# Patient Record
Sex: FEMALE | Race: WHITE | NOT HISPANIC OR LATINO | Employment: FULL TIME | URBAN - METROPOLITAN AREA
[De-identification: names, ages, dates, MRNs, and addresses within clinical notes are randomized per-mention and may not be internally consistent; named-entity substitution may affect disease eponyms.]

---

## 2017-04-04 ENCOUNTER — ALLSCRIPTS OFFICE VISIT (OUTPATIENT)
Dept: OTHER | Facility: OTHER | Age: 62
End: 2017-04-04

## 2017-05-22 ENCOUNTER — ALLSCRIPTS OFFICE VISIT (OUTPATIENT)
Dept: OTHER | Facility: OTHER | Age: 62
End: 2017-05-22

## 2017-05-22 DIAGNOSIS — Z00.00 ENCOUNTER FOR GENERAL ADULT MEDICAL EXAMINATION WITHOUT ABNORMAL FINDINGS: ICD-10-CM

## 2017-05-22 DIAGNOSIS — I10 ESSENTIAL (PRIMARY) HYPERTENSION: ICD-10-CM

## 2017-05-22 DIAGNOSIS — H04.129 DRY EYE SYNDROME: ICD-10-CM

## 2017-05-22 DIAGNOSIS — M81.0 AGE-RELATED OSTEOPOROSIS WITHOUT CURRENT PATHOLOGICAL FRACTURE: ICD-10-CM

## 2017-06-28 ENCOUNTER — GENERIC CONVERSION - ENCOUNTER (OUTPATIENT)
Dept: OTHER | Facility: OTHER | Age: 62
End: 2017-06-28

## 2017-07-10 ENCOUNTER — APPOINTMENT (OUTPATIENT)
Dept: LAB | Facility: CLINIC | Age: 62
End: 2017-07-10
Payer: COMMERCIAL

## 2017-07-10 DIAGNOSIS — H04.129 DRY EYE SYNDROME: ICD-10-CM

## 2017-07-10 DIAGNOSIS — Z00.00 ENCOUNTER FOR GENERAL ADULT MEDICAL EXAMINATION WITHOUT ABNORMAL FINDINGS: ICD-10-CM

## 2017-07-10 DIAGNOSIS — I10 ESSENTIAL (PRIMARY) HYPERTENSION: ICD-10-CM

## 2017-07-10 DIAGNOSIS — M81.0 AGE-RELATED OSTEOPOROSIS WITHOUT CURRENT PATHOLOGICAL FRACTURE: ICD-10-CM

## 2017-07-10 LAB
25(OH)D3 SERPL-MCNC: 34.1 NG/ML (ref 30–100)
ALBUMIN SERPL BCP-MCNC: 4.1 G/DL (ref 3.5–5)
ALP SERPL-CCNC: 58 U/L (ref 46–116)
ALT SERPL W P-5'-P-CCNC: 42 U/L (ref 12–78)
ANION GAP SERPL CALCULATED.3IONS-SCNC: 5 MMOL/L (ref 4–13)
AST SERPL W P-5'-P-CCNC: 21 U/L (ref 5–45)
BILIRUB SERPL-MCNC: 0.66 MG/DL (ref 0.2–1)
BUN SERPL-MCNC: 16 MG/DL (ref 5–25)
CALCIUM SERPL-MCNC: 9.7 MG/DL (ref 8.3–10.1)
CHLORIDE SERPL-SCNC: 105 MMOL/L (ref 100–108)
CHOLEST SERPL-MCNC: 216 MG/DL (ref 50–200)
CO2 SERPL-SCNC: 29 MMOL/L (ref 21–32)
CREAT SERPL-MCNC: 0.73 MG/DL (ref 0.6–1.3)
GFR SERPL CREATININE-BSD FRML MDRD: >60 ML/MIN/1.73SQ M
GLUCOSE P FAST SERPL-MCNC: 100 MG/DL (ref 65–99)
HDLC SERPL-MCNC: 46 MG/DL (ref 40–60)
LDLC SERPL CALC-MCNC: 126 MG/DL (ref 0–100)
POTASSIUM SERPL-SCNC: 4.8 MMOL/L (ref 3.5–5.3)
PROT SERPL-MCNC: 7.2 G/DL (ref 6.4–8.2)
SODIUM SERPL-SCNC: 139 MMOL/L (ref 136–145)
TRIGL SERPL-MCNC: 222 MG/DL

## 2017-07-10 PROCEDURE — 36415 COLL VENOUS BLD VENIPUNCTURE: CPT

## 2017-07-10 PROCEDURE — 80053 COMPREHEN METABOLIC PANEL: CPT

## 2017-07-10 PROCEDURE — 86235 NUCLEAR ANTIGEN ANTIBODY: CPT

## 2017-07-10 PROCEDURE — 80061 LIPID PANEL: CPT

## 2017-07-10 PROCEDURE — 82306 VITAMIN D 25 HYDROXY: CPT

## 2017-07-11 ENCOUNTER — GENERIC CONVERSION - ENCOUNTER (OUTPATIENT)
Dept: OTHER | Facility: OTHER | Age: 62
End: 2017-07-11

## 2017-07-11 LAB
ENA SS-A AB SER-ACNC: <0.2 AI (ref 0–0.9)
ENA SS-B AB SER-ACNC: <0.2 AI (ref 0–0.9)

## 2017-11-21 ENCOUNTER — ALLSCRIPTS OFFICE VISIT (OUTPATIENT)
Dept: OTHER | Facility: OTHER | Age: 62
End: 2017-11-21

## 2017-11-22 NOTE — PROGRESS NOTES
Assessment    1  Essential hypertension (401 9) (I10)   2  Osteoporosis (733 00) (M81 0)   3  Essential tremor (333 1) (G25 0)   4  Left thyroid nodule (241 0) (E04 1)    Plan  Colon cancer screening    · COLONOSCOPY; Status:Active - Retrospective By Protocol Authorization; Requestedfor:2017;   Essential hypertension    · Lisinopril 10 MG Oral Tablet   · (1) CBC/PLT/DIFF; Status:Active; Requested [id number];    · (1) COMPREHENSIVE METABOLIC PANEL; Status:Active; Requested [id number];    · (1) LIPID PANEL, FASTING; Status:Active; Requested [id number];    · (1) TSH; Status:Active; Requested EQY:[id number];   Essential hypertension, Essential tremor    · Nadolol 20 MG Oral Tablet; 1/2 TAB AT HS FOR 4 NIGHTS THEN 1 TAB AT HS  Left thyroid nodule    · US THYROID; Status:Hold For - Scheduling; Requested YIN:[id number];   Osteoporosis    · * DXA BONE DENSITY SPINE HIP AND PELVIS; Status:Hold For - Scheduling; Requestedfor:2018; Discussion/Summary  Discussion Summary:   CHANGE LISINOPRIL TO NADOLOL TO HELP WITH TREMOR- IF NO BETTER CALLTHYROID USDEXA SCAN IN -WITH UPDATEIN  IF ALL STABLE  Counseling Documentation With Imm: The patient was counseled regarding diagnostic results,-- instructions for management,-- risk factor reductions,-- prognosis,-- patient and family education,-- impressions,-- risks and benefits of treatment options,-- importance of compliance with treatment  Chief Complaint  Chief Complaint Free Text Note Form: PT HERE FOR 6 MONTH FOLLOW UPSTILL HAS SHAKING IN HER HANDSHX OF FAMILIAL TREMORSIS ON LISINOPRIL AND DOING WELL; HER BP OFF OF MEDS WAS ELEVATED      History of Present Illness  Essential Tremor (Brief): The patient is being seen for worsening symptoms of essential tremor   Symptoms:  tremor, but-- no muscle stiffness,-- no muscle weakness,-- no poor coordination,-- no choking,-- no difficulty speaking,-- no difficulty walking,-- no difficulty rising from a chair-- and-- no difficulty initiating movement  The patient is currently experiencing symptoms  Associated symptoms:  no muscle pain,-- no anxiety,-- no palpitations,-- no sweating,-- no embarrassment-- and-- no depression  No associated symptoms are reported  HPI: PT HERE FOR FOLLOW UPHAS ESTRELLITA GODWINLABS WERE REVIEWED FROM JULY;     Hypertension (Follow-Up): The patient presents for follow-up of essential hypertension-- and-- BP WELL CONTROLLED ON LISINOPRIL- PT HAS TREMOR- WILL CHANGE TO BETA BLOCKER ;  The patient states she has been doing well with her blood pressure control since the last visit  She has no comorbid illnesses  She has no significant interval events  Symptoms: The patient is currently asymptomatic  denies impaired vision,-- denies dyspnea,-- denies chest pain,-- denies intermittent leg claudication-- and-- denies lower extremity edema  Review of Systems  Complete-Female:  Constitutional: as noted in HPI,-- not feeling poorly-- and-- not feeling tired  Eyes: WEARS GLASSES, but-- No complaints of eye pain, no red eyes, no eyesight problems, no discharge, no dry eyes, no itching of eyes,-- no eyesight problems-- and-- no purulent discharge from the eyes  ENT: no complaints of earache, no loss of hearing, no nose bleeds, no nasal discharge, no sore throat, no hoarseness,-- no nosebleeds-- and-- no nasal discharge  Cardiovascular: No complaints of slow heart rate, no fast heart rate, no chest pain, no palpitations, no leg claudication, no lower extremity edema,-- as noted in HPI-- and-- no chest pain  Respiratory: cough, but-- No complaints of shortness of breath, no wheezing, no cough, no SOB on exertion, no orthopnea, no PND,-- no shortness of breath,-- no wheezing-- and-- no shortness of breath during exertion    Gastrointestinal: No complaints of abdominal pain, no constipation, no nausea or vomiting, no diarrhea, no bloody stools,-- no abdominal pain,-- no nausea,-- no constipation-- and-- no diarrhea  Genitourinary: No complaints of dysuria, no incontinence, no pelvic pain, no dysmenorrhea, no vaginal discharge or bleeding,-- no dysuria-- and-- no incontinence  Musculoskeletal: No complaints of arthralgias, no myalgias, no joint swelling or stiffness, no limb pain or swelling,-- no arthralgias-- and-- no myalgias  Integumentary: No complaints of skin rash or lesions, no itching, no skin wounds, no breast pain or lump-- and-- no rashes  Neurological: No complaints of headache, no confusion, no convulsions, no numbness, no dizziness or fainting, no tingling, no limb weakness, no difficulty walking,-- no headache,-- no numbness,-- no confusion-- and-- no dizziness  Psychiatric: Not suicidal, no sleep disturbance, no anxiety or depression, no change in personality, no emotional problems  Endocrine: No complaints of proptosis, no hot flashes, no muscle weakness, no deepening of the voice, no feelings of weakness,-- no proptosis-- and-- no hot flashes  Hematologic/Lymphatic: No complaints of swollen glands, no swollen glands in the neck, does not bleed easily, does not bruise easily,-- no swollen glands,-- no tendency for easy bleeding,-- no tendency for easy bruising-- and-- no swollen glands in the neck  ROS Reviewed:   ROS reviewed  Active Problems  1  Allergic rhinitis (477 9) (J30 9)   2  Cervical adenitis (289 3) (I88 9)   3  Colon cancer screening (V76 51) (Z12 11)   4  Dry eye (375 15) (H04 129)   5  Essential hypertension (401 9) (I10)   6  Denied: History of Mental disorder   7  Osteoporosis (733 00) (M81 0)   8  Sciatica of right side (724 3) (M54 31)   9  Denied: History of Substance abuse   10  Thyromegaly (240 9) (E04 9)    Past Medical History  1  History of lymphadenopathy (V13 89) (M89 049)   2  History of screening mammography (V15 89) (Z92 89)   3  Denied: History of Mental disorder   4   Denied: History of Substance abuse  Active Problems And Past Medical History Reviewed: The active problems and past medical history were reviewed and updated today  Surgical History  1  History of Breast Surgery Reduction Procedure   2  History of Tonsillectomy  Surgical History Reviewed: The surgical history was reviewed and updated today  Family History  Mother    1  Family history of Coronary disease   2  Family history of diabetes mellitus (V18 0) (Z83 3)   3  Family history of hypertension (V17 49) (Z82 49)   4  Family history of osteoporosis (V17 81) (Z82 62)  Father    5  Family history of diabetes mellitus (V18 0) (Z83 3)  Family History Reviewed: The family history was reviewed and updated today  Social History     · Never a smoker   · Never smoked cigarettes (V49 89) (Z78 9)  Social History Reviewed: The social history was reviewed and updated today  The social history was reviewed and is unchanged  Current Meds   1  Calcium 600+D 600-400 MG-UNIT Oral Tablet; Therapy: (Recorded:12Jan2016) to Recorded   2  ChoiceFul Multivitamin CAPS; Therapy: (Recorded:12Jan2016) to Recorded   3  Fish Oil CAPS; Therapy: (Recorded:12Jan2016) to Recorded   4  GNP Lutein TABS; Therapy: (Recorded:12Jan2016) to Recorded   5  Ibandronate Sodium 150 MG Oral Tablet; TAKE 1 TABLET BY MOUTH MONTHLY; Therapy: 48MXP1865 to (Evaluate:14Jan2018)  Requested for: 03DZW2218; Last Rx:19Jan2017 Ordered   6  Lisinopril 10 MG Oral Tablet; TAKE 1 TABLET BY MOUTH EVERY DAY; Therapy: 63ZZH5812 to (Evaluate:22Jan2018)  Requested for: 02Uuz4729; Last Rx:28Mli0889 Ordered   7  Restasis 0 05 % Ophthalmic Emulsion; INSTILL 1 DROP IN BOTH EYES EVERY 12 HOURS DAILY; Therapy: 10GTN9662 to (Evaluate:13Jan2018)  Requested for: 79Flw5745; Last Rx:48Bsh4938 Ordered   8  ValACYclovir HCl - 1 GM Oral Tablet; TAKE 1 TABLET EVERY 12 HOURS DAILY; Therapy: 09THT8223 to (Evaluate:26Jun2016)  Requested for: 89DPE6670; Last Rx:28Jan2016 Ordered   9  Vitamin D3 1000 UNIT Oral Tablet;  Therapy: (Recorded:12Jan2016) to Recorded    Allergies  1  No Known Drug Allergies    Vitals  Vital Signs    Recorded: 21Nov2017 02:02PM   Temperature 98 1 F   Heart Rate 74   Respiration 16   Systolic 570   Diastolic 70   Height 5 ft 7 in   Weight 199 lb    BMI Calculated 31 17   BSA Calculated 2 02       Physical Exam   Constitutional  General appearance: No acute distress, well appearing and well nourished  Eyes  Conjunctiva and lids: No swelling, erythema or discharge  Pupils and irises: Equal, round and reactive to light  Ears, Nose, Mouth, and Throat  External inspection of ears and nose: Normal    Otoscopic examination: Tympanic membranes translucent with normal light reflex  Canals patent without erythema  Nasal mucosa, septum, and turbinates: Normal without edema or erythema  Oropharynx: Normal with no erythema, edema, exudate or lesions  Pulmonary  Respiratory effort: No increased work of breathing or signs of respiratory distress  Auscultation of lungs: Clear to auscultation  Auscultation of the lungs revealed no expiratory wheezing,-- normal expiratory time-- and-- no inspiratory wheezing  no rales or crackles were heard bilaterally  no rhonchi  no friction rub  no wheezing  no diminished breath sounds  no bronchial breath sounds  Cardiovascular  Palpation of heart: Normal PMI, no thrills  Auscultation of heart: Normal rate and rhythm, normal S1 and S2, without murmurs  Abdomen  Abdomen: Non-tender, no masses  Liver and spleen: No hepatomegaly or splenomegaly  Lymphatic  Palpation of lymph nodes in neck: No lymphadenopathy  Musculoskeletal  Gait and station: Normal    Digits and nails: Normal without clubbing or cyanosis  Inspection/palpation of joints, bones, and muscles: Normal    Skin  Skin and subcutaneous tissue: Normal without rashes or lesions  Neurologic SL TREMOR LEFT UPPER EXT - INTENTION  Signatures   Electronically signed by :  Joao Rg DO; Nov 21 2017  2:46PM EST (Author)

## 2018-01-11 NOTE — RESULT NOTES
Verified Results  * DXA BONE DENSITY SPINE HIP AND PELVIS 29Jun2016 08:45AM Conception Andrew Order Number: [id number]     Test Name Result Flag Reference   DXA BONE DENSITY SPINE HIP AND PELVIS (Report)     CENTRAL DXA SCAN     CLINICAL HISTORY:  64year old post-menopausal  female risk factors include family history of osteoporosis  Degenerative arthritis  TECHNIQUE: Bone densitometry was performed using a Hologic Horizon A  bone densitometer  Regions of interest appear properly placed  There are no obvious fractures or other confounding variables which could limit the study  Degenerative changes of the   lumbar spine and hip  This will falsely elevate the bone mineral densities in these regions  COMPARISON: January 22, 2009     RESULTS:    LUMBAR SPINE: L1-L4:   BMD 0 928 gm/cm2   T-score -1 1   Z-score 0 2     LUMBAR SPINE L2 and L4 (average) :         BMD 0 909 gm/sq-cm        T-score is -1 5         Z-score is -0 2          LEFT TOTAL HIP:   BMD 0 839 gm/cm2   T-score -0 8   Z-score 0 0     LEFT FEMORAL NECK:   BMD 0 557 gm/cm2   T-score -2 6   Z-score -1 4           ASSESSMENT:   1  Based on the WHO classification, the T-score of -2 6 in the left hip is consistent with osteoporosis  2  Since the prior study, there has been a decrease in the bone mineral density of the lumbar spine and an increase in the bone mineral density of the left hip  It should be noted however that the examinations were performed on dissimilar DXA units  3  According to the [address] [place] [address], prescription therapy is recommended with a T-score of -2 5 or less in the spine or hip after appropriate evaluation to exclude secondary causes     4  A daily intake of at least 1200 mg Calcium and 800 to 1000 IU of Vitamin D, as well as weight bearing and muscle strengthening exercise, fall prevention and avoidance of tobacco and excessive alcohol intake as basic preventive measures are suggested  5  Repeat DXA scan in 18-24 months as clinically indicated  WHO CLASSIFICATION:   Normal (a T-score of -1 0 or higher)   Low bone mineral density (a T-score of less than -1 0 but higher than -2 5)   Osteoporosis (a T-score of -2 5 or less)   Severe osteoporosis (a T-score of -2 5 or less with a fragility fracture)             Workstation performed: [id number]       Discussion/Summary   DXA scan shows osteoporosis of the hip  We can consider starting medication now(fosomax, boniva) (once a week or once a month) in addition to calcium and vitamin d  Or we can discuss it at your appointment

## 2018-01-11 NOTE — RESULT NOTES
Message   Cholesterol is better but total and TG went up;  please add vitamin D 4000 iu daily  Verified Results  (1) CBC/PLT/DIFF 53Jsk2611 09:30AM Nurys Wharton    Order Number: [id number]     Test Name Result Flag Reference   WBC COUNT 8 21 Thousand/uL  4 31-10 16   RBC COUNT 4 78 Million/uL  3 81-5 12   HEMOGLOBIN 13 9 g/dL  11 5-15 4   HEMATOCRIT 41 5 %  34 8-46  1   MCV 87 fL  82-98   MCH 29 1 pg  26 8-34 3   MCHC 33 5 g/dL  31 4-37 4   RDW 13 1 %  11 6-15 1   MPV 10 0 fL  8 9-12 7   PLATELET COUNT 453 Thousands/uL  149-390   nRBC AUTOMATED 0 /100 WBCs     NEUTROPHILS RELATIVE PERCENT 65 %  43-75   LYMPHOCYTES RELATIVE PERCENT 28 %  14-44   MONOCYTES RELATIVE PERCENT 4 %  4-12   EOSINOPHILS RELATIVE PERCENT 2 %  0-6   BASOPHILS RELATIVE PERCENT 1 %  0-1   NEUTROPHILS ABSOLUTE COUNT 5 27 Thousands/?L  1 85-7 62   LYMPHOCYTES ABSOLUTE COUNT 2 32 Thousands/?L  0 60-4 47   MONOCYTES ABSOLUTE COUNT 0 36 Thousand/?L  0 17-1 22   EOSINOPHILS ABSOLUTE COUNT 0 19 Thousand/?L  0 00-0 61   BASOPHILS ABSOLUTE COUNT 0 04 Thousands/?L  0 00-0 10   - Patient Instructions: This bloodwork is non-fasting  Please drink two glasses of water morning of bloodwork  - Patient Instructions: This bloodwork is non-fasting  Please drink two glasses of water morning of bloodwork  (1) COMPREHENSIVE METABOLIC PANEL 79EAT2350 23:39ZX Nurys Wharton    Order Number: [id number]     Test Name Result Flag Reference   GLUCOSE,RANDM 101 mg/dL     If the patient is fasting, the ADA then defines impaired fasting glucose as > 100 mg/dL and diabetes as > or equal to 123 mg/dL     SODIUM 139 mmol/L  136-145   POTASSIUM 4 5 mmol/L  3 5-5 3   CHLORIDE 104 mmol/L  100-108   CARBON DIOXIDE 28 mmol/L  21-32   ANION GAP (CALC) 7 mmol/L  4-13   BLOOD UREA NITROGEN 13 mg/dL  5-25   CREATININE 0 65 mg/dL  0 60-1 30   Standardized to IDMS reference method   CALCIUM 8 7 mg/dL  8 3-10 1   BILI, TOTAL 0 50 mg/dL  0 20-1 00   ALK PHOSPHATAS 79 U/L     ALT (SGPT) 34 U/L  12-78   AST(SGOT) 16 U/L  5-45   ALBUMIN 3 7 g/dL  3 5-5 0   TOTAL PROTEIN 7 1 g/dL  6 4-8 2   eGFR Non-African American      >60 0 ml/min/1 73sq m   - Patient Instructions: This is a fasting blood test  Water,black tea or black  coffee only after 9:00pm the night before test Drink 2 glasses of water the morning of test - Patient Instructions: This bloodwork is non-fasting  Please drink two glasses of   water morning of bloodwork  National Kidney Disease Education Program recommendations are as follows:  GFR calculation is accurate only with a steady state creatinine  Chronic Kidney disease less than 60 ml/min/1 73 sq  meters  Kidney failure less than 15 ml/min/1 73 sq  meters  (1) LIPID PANEL, FASTING [id number] 09:30AM Maribelfan Downingwin    Order Number: [id number]     Test Name Result Flag Reference   CHOLESTEROL 230 mg/dL H    HDL,DIRECT 50 mg/dL  40-60   Specimen collection should occur prior to Metamizole administration due to the potential for falsely depressed results  LDL CHOLESTEROL CALCULATED 141 mg/dL H 0-100   - Patient Instructions: This is a fasting blood test  Water,black tea or black  coffee only after 9:00pm the night before test   Drink 2 glasses of water the morning of test     - Patient Instructions: This is a fasting blood test  Water,black tea or black  coffee only after 9:00pm the night before test Drink 2 glasses of water the morning of test - Patient Instructions: This bloodwork is non-fasting  Please drink two glasses of   water morning of bloodwork    Triglyceride:         Normal              <150 mg/dl       Borderline High    150-199 mg/dl       High               200-499 mg/dl       Very High          >499 mg/dl  Cholesterol:         Desirable        <200 mg/dl      Borderline High  200-239 mg/dl      High             >239 mg/dl  HDL Cholesterol:        High    >59 mg/dL      Low     <41 mg/dL  LDL CALCULATED:    This screening LDL is a calculated result  It does not have the accuracy of the Direct Measured LDL in the monitoring of patients with hyperlipidemia and/or statin therapy  Direct Measure LDL (WON256) must be ordered separately in these patients  TRIGLYCERIDES 196 mg/dL H <=150   Specimen collection should occur prior to N-Acetylcysteine or Metamizole administration due to the potential for falsely depressed results  (1) TSH [id number] 09:30AM EMcube   TW Order Number: [id number]     Test Name Result Flag Reference   TSH 1 460 uIU/mL  0 358-3 740   - Patient Instructions: This bloodwork is non-fasting  Please drink two glasses of water morning of bloodwork  - Patient Instructions: This is a fasting blood test  Water,black tea or black  coffee only after 9:00pm the night before test Drink 2 glasses of water the morning of test - Patient Instructions: This bloodwork is non-fasting  Please drink two glasses of   water morning of bloodwork  Patients undergoing fluorescein dye angiography may retain small amounts of fluorescein in the body for 48-72 hours post procedure  Samples containing fluorescein can produce falsely depressed TSH values  If the patient had this procedure,a specimen should be resubmitted post fluorescein clearance            The recommended reference ranges for TSH during pregnancy are as follows:  First trimester 0 1 to 2 5 uIU/mL  Second trimester  0 2 to 3 0 uIU/mL  Third trimester 0 3 to 3 0 uIU/m     (1) VITAMIN D 25-HYDROXY 20Dec2016 09:30AM EMcube   TW Order Number: [id number]     Test Name Result Flag Reference   VIT D 25-HYDROX 23 6 ng/mL L 30 0-100 0   This assay is a certified procedure of the CDC Vitamin D Standardization Certification Program (VDSCP)     Deficiency <20ng/ml   Insufficiency 20-30ng/ml   Sufficient  ng/ml     *Patients undergoing fluorescein dye angiography may retain small amounts of fluorescein in the body for 48-72 hours post procedure  Samples containing fluorescein can produce falsely elevated Vitamin D values  If the patient had this procedure, a specimen should be resubmitted post fluorescein clearance

## 2018-01-11 NOTE — PROGRESS NOTES
Assessment    1  Essential hypertension (401 9) (I10)   2  Osteoporosis (733 00) (M81 0)   3  Colon cancer screening (V76 51) (Z12 11)   4  Encounter for preventive health examination (V70 0) (Z00 00)   5  Dry eye (375 15) (H04 129)    Plan  Colon cancer screening    · COLONOSCOPY; Status:Active; Requested for:22May2017;   Dry eye    · Restasis 0 05 % Ophthalmic Emulsion; INSTILL 1 DROP IN BOTH EYES EVERY  12 HOURS DAILY   · (1) SJOGRENS ANTIBODIES; Status:Active; Requested for:22May2017; Health Maintenance    · (1) COMPREHENSIVE METABOLIC PANEL; Status:Active; Requested for:22May2017; Health Maintenance, Essential hypertension    · (1) LIPID PANEL, FASTING; Status:Active; Requested for:22May2017; Health Maintenance, Osteoporosis    · (1) VITAMIN D 25-HYDROXY; Status:Active; Requested for:22May2017;   Osteoporosis    · * DXA BONE DENSITY SPINE HIP AND PELVIS; Status:Hold For - Scheduling;  Requested for:22May2017;     Discussion/Summary  health maintenance visit healthy adult female Currently, she eats a healthy diet  cervical cancer screening is current Breast cancer screening: mammogram is current, mammogram has been ordered and mammogram is needed every year  Colorectal cancer screening: colonoscopy has been ordered  Osteoporosis screening: bone mineral density testing is current  Advice and education were given regarding weight loss, calcium supplements and vitamin D supplements  Patient discussion: discussed with the patient  OBTAIN LABS- CHECK SJOGRENS AB  ADD RESTASIS  ADD B6 50 BID AND COCK UP SPLINT LEFT HAND/WRIST AT HS  COLONO DUE  IMM UP TO DATE  MAMMO AND PAP UPT OD ATE;  The patient was counseled regarding diagnostic results, instructions for management, risk factor reductions, prognosis, patient and family education, impressions, risks and benefits of treatment options, importance of compliance with treatment        Chief Complaint  PATIENT HERE FOR ANNUAL EXAM;   SHE HAS NOTICED TREMOR OF LEFT HAND WHEN HOLDING ITEMS;   SHE IS ON BISPHOSPONATE;   SHE GETS DRY EYES       History of Present Illness  HM, Adult Female: The patient is being seen for a health maintenance evaluation  General Health: The patient's health since the last visit is described as good  She has regular dental visits  She denies vision problems  Vision care includes wearing glasses  She denies hearing loss  Immunizations status: up to date  Lifestyle:  She consumes a diverse and healthy diet  She does not have any weight concerns  She exercises regularly  She does not use tobacco  She denies alcohol use  She denies drug use  Screening: cancer screening reviewed and current  metabolic screening reviewed and current  risk screening reviewed and current  Dry Eye: Stephy Ramon presents with complaints of dry eye  Associated symptoms include foreign body sensation, but no eye itching, no eye redness, no eye burning, no eye pain, no contact lens intolerance, no blurry vision, no eye discharge, no photophobia, no blinking, no lid closure problems, no lid lumps, no lid crusting, no dry mouth, no myalgias and no arthralgias  Tremor: Stephy Ramon presents with complaints of tremor  Associated symptoms include no muscle pain, no muscle stiffness, no muscle weakness, no poor coordination, no micrographia, no choking, no difficulty speaking, no difficulty walking, no difficulty rising from a chair, no difficulty starting movement, no anxiety, no palpitations, no sweating, no weight loss, no shortness of breath, no fatigue, no confusion, no forgetfulness, no embarrassment, no depression and no erectile dysfunction  HPI: PT HERE FOR ANNUAL EXAM  SHE HAS DRY EYES   OCC TREMOR LEFT HAND  OCC TEARING RIGHT EYE WITH FB SENSATION       Osteoporosis (Follow-Up): The patient's osteoporosis has been stable since the last visit  Most recent DXA results: T-score <-2 5  Comorbid Illnesses: FEMORAL NECK -2 6   She has no complications  She has no significant interval events  Symptoms: The patient is currently asymptomatic  Hypertension (Follow-Up): The patient presents for follow-up of essential hypertension  The patient states she has been doing well with her blood pressure control since the last visit  She has no comorbid illnesses  She has no significant interval events  Symptoms: The patient is currently asymptomatic  denies impaired vision, denies dyspnea, denies chest pain, denies intermittent leg claudication and denies lower extremity edema  Review of Systems    Constitutional: as noted in HPI, not feeling poorly and not feeling tired  Eyes: No complaints of eye pain, no red eyes, no eyesight problems, no discharge, no dry eyes, no itching of eyes  ENT: no complaints of earache, no loss of hearing, no nose bleeds, no nasal discharge, no sore throat, no hoarseness  Cardiovascular: No complaints of slow heart rate, no fast heart rate, no chest pain, no palpitations, no leg claudication, no lower extremity edema, as noted in HPI and no chest pain  Respiratory: cough, but No complaints of shortness of breath, no wheezing, no cough, no SOB on exertion, no orthopnea, no PND, no shortness of breath, no wheezing and no shortness of breath during exertion  Gastrointestinal: No complaints of abdominal pain, no constipation, no nausea or vomiting, no diarrhea, no bloody stools, no abdominal pain, no nausea, no constipation and no diarrhea  Genitourinary: No complaints of dysuria, no incontinence, no pelvic pain, no dysmenorrhea, no vaginal discharge or bleeding, no dysuria and no incontinence  Musculoskeletal: No complaints of arthralgias, no myalgias, no joint swelling or stiffness, no limb pain or swelling, no arthralgias and no myalgias  Integumentary: No complaints of skin rash or lesions, no itching, no skin wounds, no breast pain or lump and no rashes     Neurological: No complaints of headache, no confusion, no convulsions, no numbness, no dizziness or fainting, no tingling, no limb weakness, no difficulty walking, no headache, no numbness, no confusion and no dizziness  Psychiatric: Not suicidal, no sleep disturbance, no anxiety or depression, no change in personality, no emotional problems  Endocrine: No complaints of proptosis, no hot flashes, no muscle weakness, no deepening of the voice, no feelings of weakness, no proptosis and no hot flashes  Hematologic/Lymphatic: No complaints of swollen glands, no swollen glands in the neck, does not bleed easily, does not bruise easily, no swollen glands, no tendency for easy bleeding, no tendency for easy bruising and no swollen glands in the neck  ROS reviewed  Active Problems    1  Allergic rhinitis (477 9) (J30 9)   2  Cervical adenitis (289 3) (I88 9)   3  Essential hypertension (401 9) (I10)   4  Denied: History of Mental disorder   5  Osteoporosis (733 00) (M81 0)   6  Sciatica of right side (724 3) (M54 31)   7  Denied: History of Substance abuse   8  Thyromegaly (240 9) (E04 9)    Past Medical History    · History of lymphadenopathy (V13 89) (Z87 898)   · Denied: History of Mental disorder   · Denied: History of Substance abuse    Surgical History    · History of Breast Surgery Reduction Procedure   · History of Tonsillectomy    Family History  Mother    · Family history of Coronary disease   · Family history of diabetes mellitus (V18 0) (Z83 3)   · Family history of hypertension (V17 49) (Z82 49)   · Family history of osteoporosis (V17 81) (Z80 61)  Father    · Family history of diabetes mellitus (V18 0) (Z83 3)    Social History    · Never a smoker   · Never smoked cigarettes (V49 89) (Z78 9)    Current Meds   1  Calcium 600+D 600-400 MG-UNIT Oral Tablet; Therapy: (Recorded:12Jan2016) to Recorded   2  ChoiceFul Multivitamin CAPS; Therapy: (Recorded:12Jan2016) to Recorded   3  Fish Oil CAPS;    Therapy: (Recorded:12Jan2016) to Recorded 4  GNP Lutein TABS; Therapy: (Recorded:12Jan2016) to Recorded   5  Ibandronate Sodium 150 MG Oral Tablet; TAKE 1 TABLET BY MOUTH MONTHLY; Therapy: 84XMZ2728 to (Evaluate:14Jan2018)  Requested for: 09SBT7527; Last   Rx:19Jan2017 Ordered   6  Lisinopril 10 MG Oral Tablet; TAKE 1 TABLET BY MOUTH EVERY DAY; Therapy: 12SUV5831 to (Evaluate:20Jun2017)  Requested for: 22Nov2016; Last   Rx:22Nov2016 Ordered   7  ValACYclovir HCl - 1 GM Oral Tablet; TAKE 1 TABLET EVERY 12 HOURS DAILY; Therapy: 85JKH6264 to (Evaluate:26Jun2016)  Requested for: 99AYG4551; Last   Rx:28Jan2016 Ordered   8  Vitamin D3 1000 UNIT Oral Tablet; Therapy: (Recorded:12Jan2016) to Recorded    Allergies    1  No Known Drug Allergies    Vitals   Recorded: 76EJI1312 10:20AM   Temperature 97 4 F   Heart Rate 76   Respiration 16   Systolic 456   Diastolic 72   Height 5 ft 7 in   Weight 198 lb    BMI Calculated 31 01   BSA Calculated 2 01     Physical Exam    Constitutional   General appearance: No acute distress, well appearing and well nourished  Eyes   Conjunctiva and lids: No swelling, erythema or discharge  Pupils and irises: Equal, round, reactive to light  Ears, Nose, Mouth, and Throat   External inspection of ears and nose: Normal     Otoscopic examination: Tympanic membranes translucent with normal light reflex  Canals patent without erythema  Hearing: Normal     Nasal mucosa, septum, and turbinates: Normal without edema or erythema  Lips, teeth, and gums: Normal, good dentition  Oropharynx: Normal with no erythema, edema, exudate or lesions  Inspection of the oropharynx showed fully visible tonsils, uvula and soft palate (Mallampati class 1)  Oral mucosa was moist, but was normal  The palate examination showed no abnormalities  The tongue was normal  The tonsils were normal    Neck   Neck: Supple, symmetric, trachea midline, no masses  Thyroid: Normal, no thyromegaly      Pulmonary   Respiratory effort: No increased work of breathing or signs of respiratory distress  Percussion of chest: Normal     Auscultation of lungs: Clear to auscultation  Cardiovascular   Palpation of heart: Normal PMI, no thrills  Auscultation of heart: Normal rate and rhythm, normal S1 and S2, no murmurs  Examination of extremities for edema and/or varicosities: Normal     Lymphatic   Palpation of lymph nodes in neck: No lymphadenopathy  Musculoskeletal   Gait and station: Normal     Digits and nails: Normal without clubbing or cyanosis  Range of motion: Normal     Stability: Normal   SOME NUMBNESS LEFT 3-4 DIGITS; NORMAL ROM;    Skin   Palpation of skin and subcutaneous tissue: Normal turgor  Neurologic   Cranial nerves: Cranial nerves II-XII intact  Cortical function: Normal mental status  Reflexes: 2+ and symmetric  Sensation: No sensory loss  Coordination: Normal finger to nose and heel to shin  Psychiatric   Judgment and insight: Normal     Orientation to person, place, and time: Normal        Results/Data  PHQ-2 Adult Depression Screening 26OLJ3047 10:25AM User, Ahs     Test Name Result Flag Reference   PHQ-2 Adult Depression Score 0     Over the last two weeks, how often have you been bothered by any of the following problems? Little interest or pleasure in doing things: Not at all - 0  Feeling down, depressed, or hopeless: Not at all - 0   PHQ-2 Adult Depression Screening Negative         Signatures   Electronically signed by :  Joao Rg DO; May 22 2017 11:10AM EST                       (Author)

## 2018-01-13 VITALS
WEIGHT: 195.2 LBS | HEIGHT: 67 IN | HEART RATE: 84 BPM | TEMPERATURE: 98.6 F | DIASTOLIC BLOOD PRESSURE: 90 MMHG | SYSTOLIC BLOOD PRESSURE: 134 MMHG | RESPIRATION RATE: 18 BRPM | BODY MASS INDEX: 30.64 KG/M2

## 2018-01-14 VITALS
RESPIRATION RATE: 16 BRPM | HEART RATE: 74 BPM | DIASTOLIC BLOOD PRESSURE: 70 MMHG | SYSTOLIC BLOOD PRESSURE: 122 MMHG | TEMPERATURE: 98.1 F | WEIGHT: 199 LBS | BODY MASS INDEX: 31.23 KG/M2 | HEIGHT: 67 IN

## 2018-01-14 VITALS
DIASTOLIC BLOOD PRESSURE: 72 MMHG | HEIGHT: 67 IN | WEIGHT: 198 LBS | RESPIRATION RATE: 16 BRPM | TEMPERATURE: 97.4 F | SYSTOLIC BLOOD PRESSURE: 122 MMHG | HEART RATE: 76 BPM | BODY MASS INDEX: 31.08 KG/M2

## 2018-01-15 NOTE — RESULT NOTES
Verified Results  US THYROID 25BOG1138 08:45AM Jen Davis Order Number: [id number]     Test Name Result Flag Reference   US THYROID (Report)     THYROID ULTRASOUND     INDICATION: Fullness on routine physical examination  COMPARISON: None  TECHNIQUE:  Ultrasound of the thyroid was performed with a high frequency linear transducer in transverse and sagittal planes including volumetric imaging sweeps as well as traditional still imaging technique  FINDINGS:   Normal homogeneous smooth echotexture  Right gland: 5 0 x 1 3 x 1 9 cm  No dominant nodules  Left gland: 5 4 x 1 0 x 1 5 cm  Left midpole  0 3 x 0 4 x 0 2 cm  Possibly spongiform  Smooth, well defined margins  No calcifications  Nodule is not taller than it is wide  No priors for comparison  Isthmus: 0 3 cm in AP dimension  No dominant nodules  IMPRESSION:      Tiny 4 mm likely spongiform nodule in the midpole left gland  No dominant nodules meeting criteria for biopsy based on recent published guidelines from [employer] are identified         Workstation performed: [id number]     Signed by:   Ángela Kim MD   6/30/16

## 2018-01-18 NOTE — PROGRESS NOTES
Assessment    1  Essential hypertension (401 9) (I10)    Plan  Herpes simplex infection    · ValACYclovir HCl - 1 GM Oral Tablet; TAKE 1 TABLET EVERY 12 HOURS DAILY    Discussion/Summary  Discussion Summary:   CONTINUE LISINOPRIL 10 MG DAILY  FOLLOW UP IN 4 MONTHS;      REFILL VALTREX FOR ORAL ULCERS ON OCCASION  Counseling Documentation With Imm: The patient was counseled regarding diagnostic results, instructions for management, risk factor reductions, prognosis, patient and family education, impressions, risks and benefits of treatment options, importance of compliance with treatment  Chief Complaint  Chief Complaint Free Text Note Form: PATIENT HERE FOR FOLLOW UP OF BP  SHE FEELS WELL      History of Present Illness  HPI: PATIENT HAS NO SIDE EFFECTS FORM T HE MEDS AND SHE FEELS WELL   Hypertension (Follow-Up): The patient presents for follow-up of essential hypertension  She has no comorbid illnesses  She has no significant interval events  Symptoms: The patient is currently asymptomatic  Review of Systems  Complete-Female:   Constitutional: No fever, no chills, feels well, no tiredness, no recent weight gain or weight loss  Eyes: No complaints of eye pain, no red eyes, no eyesight problems, no discharge, no dry eyes, no itching of eyes, no eye pain and eyes not red  ENT: no complaints of earache, no loss of hearing, no nose bleeds, no nasal discharge, no sore throat, no hoarseness, no hearing loss and no nasal discharge  Cardiovascular: No complaints of slow heart rate, no fast heart rate, no chest pain, no palpitations, no leg claudication, no lower extremity edema, the heart rate was not slow, no chest pain, the heart rate was not fast and no palpitations  Respiratory: No complaints of shortness of breath, no wheezing, no cough, no SOB on exertion, no orthopnea, no PND     Gastrointestinal: No complaints of abdominal pain, no constipation, no nausea or vomiting, no diarrhea, no bloody stools, no abdominal pain, no nausea and no diarrhea  Genitourinary: No complaints of dysuria, no incontinence, no pelvic pain, no dysmenorrhea, no vaginal discharge or bleeding and no dysuria  Musculoskeletal: No complaints of arthralgias, no myalgias, no joint swelling or stiffness, no limb pain or swelling  Integumentary: No complaints of skin rash or lesions, no itching, no skin wounds, no breast pain or lump  Neurological: No complaints of headache, no confusion, no convulsions, no numbness, no dizziness or fainting, no tingling, no limb weakness, no difficulty walking  Psychiatric: Not suicidal, no sleep disturbance, no anxiety or depression, no change in personality, no emotional problems  Endocrine: No complaints of proptosis, no hot flashes, no muscle weakness, no deepening of the voice, no feelings of weakness  Hematologic/Lymphatic: No complaints of swollen glands, no swollen glands in the neck, does not bleed easily, does not bruise easily  ROS Reviewed:   ROS reviewed  Active Problems    1  Allergic rhinitis (477 9) (J30 9)   2  Breast screening (V76 10) (Z12 39)   3  Essential hypertension (401 9) (I10)   4  Herpes simplex infection (054 9) (B00 9)    Past Medical History    1  History of lymphadenopathy (V13 89) (G40 246)    Surgical History    1  History of Breast Surgery Reduction Procedure   2  History of Tonsillectomy    Family History    1  Family history of Coronary disease   2  Family history of diabetes mellitus (V18 0) (Z83 3)   3  Family history of hypertension (V17 49) (Z82 49)    4  Family history of diabetes mellitus (V18 0) (Z83 3)    Social History    · Never a smoker    Current Meds   1  Calcium 600+D 600-400 MG-UNIT Oral Tablet; Therapy: (Recorded:12Jan2016) to Recorded   2  ChoiceFul Multivitamin CAPS; Therapy: (Recorded:12Jan2016) to Recorded   3  Fish Oil CAPS; Therapy: (Recorded:12Jan2016) to Recorded   4  GNP Lutein TABS;    Therapy: (Recorded:12Jan2016) to Recorded   5  Lisinopril 10 MG Oral Tablet; TAKE 1 TABLET BY MOUTH EVERY DAY; Therapy: 50MLH9642 to (Evaluate:12Ysr7323)  Requested for: 12Jan2016; Last Rx:12Jan2016   Ordered   6  Vitamin D3 1000 UNIT Oral Tablet; Therapy: (Recorded:12Jan2016) to Recorded    Allergies    1  No Known Drug Allergies    Vitals  Vital Signs [Data Includes: Current Encounter]    Recorded: 45RIP5112 02:00PM   Temperature 98 3 F   Heart Rate 84   Respiration 19   Systolic 603   Diastolic 76   Height 5 ft 7 in   Weight 196 lb    BMI Calculated 30 7   BSA Calculated 2     Physical Exam    Constitutional   General appearance: No acute distress, well appearing and well nourished  WDWN FEMALE IN NAD  Eyes   Conjunctiva and lids: No swelling, erythema or discharge  Pupils and irises: Equal, round and reactive to light  Ears, Nose, Mouth, and Throat   External inspection of ears and nose: Normal   EOMI PERRLA  Otoscopic examination: Tympanic membranes translucent with normal light reflex  Canals patent without erythema  Nasal mucosa, septum, and turbinates: Normal without edema or erythema  Oropharynx: Normal with no erythema, edema, exudate or lesions  CLEAR  Pulmonary   Respiratory effort: No increased work of breathing or signs of respiratory distress  Auscultation of lungs: Clear to auscultation  CLEAR B/L  Cardiovascular   Palpation of heart: Normal PMI, no thrills  REGULAR NO ECTOPY  Auscultation of heart: Normal rate and rhythm, normal S1 and S2, without murmurs  Examination of extremities for edema and/or varicosities: Normal     Carotid pulses: Normal     Abdomen   Abdomen: Non-tender, no masses  Liver and spleen: No hepatomegaly or splenomegaly  Lymphatic   Palpation of lymph nodes in neck: No lymphadenopathy  Musculoskeletal   Gait and station: Normal     Digits and nails: Normal without clubbing or cyanosis      Inspection/palpation of joints, bones, and muscles: Normal     Skin   Skin and subcutaneous tissue: Normal without rashes or lesions  Neurologic   Cranial nerves: Cranial nerves 2-12 intact  Reflexes: 2+ and symmetric  Sensation: No sensory loss  Psychiatric   Orientation to person, place, and time: Normal     Mood and affect: Normal          Signatures   Electronically signed by :  Flex Roe DO; Jan 28 2016  2:38PM EST                       (Author)

## 2018-01-18 NOTE — RESULT NOTES
Discussion/Summary   Blood sugar is 100 (top normal) and triglycerides are elevated- rest of labs and sjogrens' antibodies are negative  Verified Results  (1) COMPREHENSIVE METABOLIC PANEL 06MGR9802 67:29MR [employer]   TW Order Number: [id number]     Test Name Result Flag Reference   SODIUM 139 mmol/L  136-145   POTASSIUM 4 8 mmol/L  3 5-5 3   CHLORIDE 105 mmol/L  100-108   CARBON DIOXIDE 29 mmol/L  21-32   ANION GAP (CALC) 5 mmol/L  4-13   BLOOD UREA NITROGEN 16 mg/dL  5-25   CREATININE 0 73 mg/dL  0 60-1 30   Standardized to IDMS reference method   CALCIUM 9 7 mg/dL  8 3-10 1   BILI, TOTAL 0 66 mg/dL  0 20-1 00   ALK PHOSPHATAS 58 U/L     ALT (SGPT) 42 U/L  12-78   AST(SGOT) 21 U/L  5-45   ALBUMIN 4 1 g/dL  3 5-5 0   TOTAL PROTEIN 7 2 g/dL  6 4-8 2   eGFR Non-African American      >60 0 ml/min/1 73sq [facility] Disease Education Program recommendations are as follows:  GFR calculation is accurate only with a steady state creatinine  Chronic Kidney disease less than 60 ml/min/1 73 sq  meters  Kidney failure less than 15 ml/min/1 73 sq  meters  GLUCOSE FASTING 100 mg/dL H 65-99     (1) LIPID PANEL, FASTING 76JCK8289 09:16AM [employer]   TW Order Number: [id number]     Test Name Result Flag Reference   CHOLESTEROL 216 mg/dL H    HDL,DIRECT 46 mg/dL  40-60   Specimen collection should occur prior to Metamizole administration due to the potential for falsely depressed results  LDL CHOLESTEROL CALCULATED 126 mg/dL H 0-100   Triglyceride:         Normal              <150 mg/dl       Borderline High    150-199 mg/dl       High               200-499 mg/dl       Very High          >499 mg/dl  Cholesterol:         Desirable        <200 mg/dl      Borderline High  200-239 mg/dl      High             >239 mg/dl  HDL Cholesterol:        High    >59 mg/dL      Low     <41 mg/dL  LDL CALCULATED:    This screening LDL is a calculated result    It does not have the accuracy of the Direct Measured LDL in the monitoring of patients with hyperlipidemia and/or statin therapy  Direct Measure LDL (DBW801) must be ordered separately in these patients  TRIGLYCERIDES 222 mg/dL H <=150   Specimen collection should occur prior to N-Acetylcysteine or Metamizole administration due to the potential for falsely depressed results  (1) VITAMIN D 25-HYDROXY [id number] 09:16AM [employer]    Order Number: [id number]     Test Name Result Flag Reference   VIT D 25-HYDROX 34 1 ng/mL  30 0-100 0   This assay is a certified procedure of the CDC Vitamin D Standardization Certification Program (VDSCP)     Deficiency <20ng/ml   Insufficiency 20-30ng/ml   Sufficient  ng/ml     *Patients undergoing fluorescein dye angiography may retain small amounts of fluorescein in the body for 48-72 hours post procedure  Samples containing fluorescein can produce falsely elevated Vitamin D values  If the patient had this procedure, a specimen should be resubmitted post fluorescein clearance       (1) [facility] ANTIBODIES 72UTU5428 09:16AM [employer]    Order Number: [id number]     Test Name Result Flag Reference   SS-A <0 2 AI  0 0 - 0 9   SS-B <0 2 AI  0 0 - 0 9   Performed at:  [address]  [id number]  : Cecilia Meraz MD, Phone:  [phone]

## 2018-06-02 LAB
ALBUMIN SERPL-MCNC: 4.7 G/DL (ref 3.6–5.1)
ALBUMIN/GLOB SERPL: 2 (CALC) (ref 1–2.5)
ALP SERPL-CCNC: 59 U/L (ref 33–130)
ALT SERPL-CCNC: 24 U/L (ref 6–29)
AST SERPL-CCNC: 19 U/L (ref 10–35)
BASOPHILS # BLD AUTO: 70 CELLS/UL (ref 0–200)
BASOPHILS NFR BLD AUTO: 0.8 %
BILIRUB SERPL-MCNC: 0.7 MG/DL (ref 0.2–1.2)
BUN SERPL-MCNC: 16 MG/DL (ref 7–25)
BUN/CREAT SERPL: NORMAL (CALC) (ref 6–22)
CALCIUM SERPL-MCNC: 9.9 MG/DL (ref 8.6–10.4)
CHLORIDE SERPL-SCNC: 101 MMOL/L (ref 98–110)
CHOLEST SERPL-MCNC: 240 MG/DL
CHOLEST/HDLC SERPL: 5.5 (CALC)
CO2 SERPL-SCNC: 29 MMOL/L (ref 20–31)
CREAT SERPL-MCNC: 0.81 MG/DL (ref 0.5–0.99)
EOSINOPHIL # BLD AUTO: 158 CELLS/UL (ref 15–500)
EOSINOPHIL NFR BLD AUTO: 1.8 %
ERYTHROCYTE [DISTWIDTH] IN BLOOD BY AUTOMATED COUNT: 12.4 % (ref 11–15)
GLOBULIN SER CALC-MCNC: 2.4 G/DL (CALC) (ref 1.9–3.7)
GLUCOSE SERPL-MCNC: 97 MG/DL (ref 65–99)
HCT VFR BLD AUTO: 44.9 % (ref 35–45)
HDLC SERPL-MCNC: 44 MG/DL
HGB BLD-MCNC: 15.4 G/DL (ref 11.7–15.5)
LDLC SERPL CALC-MCNC: 157 MG/DL (CALC)
LYMPHOCYTES # BLD AUTO: 2869 CELLS/UL (ref 850–3900)
LYMPHOCYTES NFR BLD AUTO: 32.6 %
MCH RBC QN AUTO: 30.6 PG (ref 27–33)
MCHC RBC AUTO-ENTMCNC: 34.3 G/DL (ref 32–36)
MCV RBC AUTO: 89.1 FL (ref 80–100)
MONOCYTES # BLD AUTO: 502 CELLS/UL (ref 200–950)
MONOCYTES NFR BLD AUTO: 5.7 %
NEUTROPHILS # BLD AUTO: 5201 CELLS/UL (ref 1500–7800)
NEUTROPHILS NFR BLD AUTO: 59.1 %
NONHDLC SERPL-MCNC: 196 MG/DL (CALC)
PLATELET # BLD AUTO: 339 THOUSAND/UL (ref 140–400)
PMV BLD REES-ECKER: 10 FL (ref 7.5–12.5)
POTASSIUM SERPL-SCNC: 4.6 MMOL/L (ref 3.5–5.3)
PROT SERPL-MCNC: 7.1 G/DL (ref 6.1–8.1)
RBC # BLD AUTO: 5.04 MILLION/UL (ref 3.8–5.1)
SL AMB EGFR AFRICAN AMERICAN: 91 ML/MIN/1.73M2
SL AMB EGFR NON AFRICAN AMERICAN: 79 ML/MIN/1.73M2
SODIUM SERPL-SCNC: 139 MMOL/L (ref 135–146)
TRIGL SERPL-MCNC: 216 MG/DL
TSH SERPL-ACNC: 1.5 MIU/L (ref 0.4–4.5)
WBC # BLD AUTO: 8.8 THOUSAND/UL (ref 3.8–10.8)

## 2018-06-21 DIAGNOSIS — M81.0 AGE-RELATED OSTEOPOROSIS WITHOUT CURRENT PATHOLOGICAL FRACTURE: ICD-10-CM

## 2018-06-21 DIAGNOSIS — E04.1 NONTOXIC SINGLE THYROID NODULE: ICD-10-CM

## 2018-06-21 DIAGNOSIS — I10 ESSENTIAL (PRIMARY) HYPERTENSION: ICD-10-CM

## 2018-06-28 ENCOUNTER — TRANSCRIBE ORDERS (OUTPATIENT)
Dept: LAB | Facility: CLINIC | Age: 63
End: 2018-06-28

## 2018-07-02 ENCOUNTER — HOSPITAL ENCOUNTER (OUTPATIENT)
Dept: RADIOLOGY | Age: 63
Discharge: HOME | End: 2018-07-02
Payer: COMMERCIAL

## 2018-07-02 DIAGNOSIS — M81.0 AGE-RELATED OSTEOPOROSIS WITHOUT CURRENT PATHOLOGICAL FRACTURE: ICD-10-CM

## 2018-07-02 DIAGNOSIS — E04.1 NONTOXIC SINGLE THYROID NODULE: ICD-10-CM

## 2018-07-02 PROCEDURE — 77080 DXA BONE DENSITY AXIAL: CPT

## 2018-07-02 PROCEDURE — 76536 US EXAM OF HEAD AND NECK: CPT

## 2018-07-11 ENCOUNTER — OFFICE VISIT (OUTPATIENT)
Dept: FAMILY MEDICINE CLINIC | Facility: CLINIC | Age: 63
End: 2018-07-11
Payer: COMMERCIAL

## 2018-07-11 VITALS
WEIGHT: 200 LBS | BODY MASS INDEX: 32.14 KG/M2 | HEIGHT: 66 IN | HEART RATE: 80 BPM | TEMPERATURE: 98.4 F | RESPIRATION RATE: 16 BRPM | SYSTOLIC BLOOD PRESSURE: 132 MMHG | DIASTOLIC BLOOD PRESSURE: 76 MMHG

## 2018-07-11 DIAGNOSIS — Z83.1 FAMILY HISTORY OF COLD SORES: ICD-10-CM

## 2018-07-11 DIAGNOSIS — M85.9 LOW BONE DENSITY: ICD-10-CM

## 2018-07-11 DIAGNOSIS — I10 ESSENTIAL HYPERTENSION: ICD-10-CM

## 2018-07-11 DIAGNOSIS — J06.9 VIRAL UPPER RESPIRATORY TRACT INFECTION: ICD-10-CM

## 2018-07-11 DIAGNOSIS — E78.01 FAMILIAL HYPERCHOLESTEROLEMIA: Primary | ICD-10-CM

## 2018-07-11 DIAGNOSIS — H04.129 DRY EYE: ICD-10-CM

## 2018-07-11 PROBLEM — E04.1 LEFT THYROID NODULE: Status: ACTIVE | Noted: 2017-11-21

## 2018-07-11 PROCEDURE — 3078F DIAST BP <80 MM HG: CPT | Performed by: INTERNAL MEDICINE

## 2018-07-11 PROCEDURE — 99214 OFFICE O/P EST MOD 30 MIN: CPT | Performed by: INTERNAL MEDICINE

## 2018-07-11 PROCEDURE — 3075F SYST BP GE 130 - 139MM HG: CPT | Performed by: INTERNAL MEDICINE

## 2018-07-11 PROCEDURE — 3008F BODY MASS INDEX DOCD: CPT | Performed by: INTERNAL MEDICINE

## 2018-07-11 RX ORDER — VALACYCLOVIR HYDROCHLORIDE 1 G/1
1000 TABLET, FILM COATED ORAL 2 TIMES DAILY
Qty: 90 TABLET | Refills: 3 | Status: SHIPPED | OUTPATIENT
Start: 2018-07-11 | End: 2022-07-29

## 2018-07-11 RX ORDER — NADOLOL 20 MG/1
20 TABLET ORAL
Qty: 90 TABLET | Refills: 3 | Status: SHIPPED | OUTPATIENT
Start: 2018-07-11 | End: 2019-01-17 | Stop reason: SDUPTHER

## 2018-07-11 RX ORDER — CYCLOSPORINE 0.5 MG/ML
1 EMULSION OPHTHALMIC EVERY 12 HOURS
Qty: 5.5 ML | Refills: 3 | Status: SHIPPED | OUTPATIENT
Start: 2018-07-11 | End: 2018-11-13 | Stop reason: SDUPTHER

## 2018-07-11 RX ORDER — IBANDRONATE SODIUM 150 MG/1
150 TABLET, FILM COATED ORAL
Refills: 3 | COMMUNITY
Start: 2018-06-02 | End: 2018-11-25 | Stop reason: SDUPTHER

## 2018-07-11 RX ORDER — CYCLOSPORINE 0.5 MG/ML
1 EMULSION OPHTHALMIC EVERY 12 HOURS
COMMUNITY
Start: 2017-05-22 | End: 2018-07-11 | Stop reason: SDUPTHER

## 2018-07-11 RX ORDER — NADOLOL 20 MG/1
1 TABLET ORAL
COMMUNITY
Start: 2018-02-12 | End: 2018-07-11 | Stop reason: SDUPTHER

## 2018-07-11 RX ORDER — VALACYCLOVIR HYDROCHLORIDE 1 G/1
1 TABLET, FILM COATED ORAL EVERY 12 HOURS
COMMUNITY
Start: 2014-07-24 | End: 2018-07-11 | Stop reason: SDUPTHER

## 2018-07-11 RX ORDER — AZITHROMYCIN 250 MG/1
TABLET, FILM COATED ORAL
Qty: 6 TABLET | Refills: 0 | Status: SHIPPED | OUTPATIENT
Start: 2018-07-11 | End: 2018-07-15

## 2018-07-11 NOTE — PROGRESS NOTES
ASSESSMENT and PLAN:  Vandana Dos Santos is a 63 y o  female with:   Problem List Items Addressed This Visit     Essential hypertension    Relevant Medications    nadolol (CORGARD) 20 mg tablet    Low bone density     Pt with hx of osteoproosis  She ahs been on boniva for since 1/2017  She is doing well and her bone density has increased  Check dexa in 2 years  [id number] treatmetn           Familial hypercholesterolemia - Primary    Relevant Orders    Comprehensive metabolic panel    Lipid panel    Family history of cold sores    Relevant Medications    valACYclovir (VALTREX) 1,000 mg tablet    Dry eye    Relevant Medications    cycloSPORINE (RESTASIS) 0 05 % ophthalmic emulsion    Viral upper respiratory tract infection    Relevant Medications    valACYclovir (VALTREX) 1,000 mg tablet    azithromycin (ZITHROMAX) 250 mg tablet        Recent Results (from the past 1008 hour(s))   Lipid panel    Collection Time: 06/01/18 10:08 AM   Result Value Ref Range    Total Cholesterol 240 (H) <200 mg/dL    HDL 44 (L) >50 mg/dL    Triglycerides 216 (H) <150 mg/dL    LDL Direct 157 (H) mg/dL (calc)    Chol HDLC Ratio 5 5 (H) <5 0 (calc)    Non-HDL Cholesterol 196 (H) <130 mg/dL (calc)   Comprehensive metabolic panel    Collection Time: 06/01/18 10:08 AM   Result Value Ref Range    SL AMB GLUCOSE 97 65 - 99 mg/dL    BUN 16 7 - 25 mg/dL    Creatinine, Serum 0 81 0 50 - 0 99 mg/dL    eGFR Non  79 > OR = 60 mL/min/1 73m2    SL AMB EGFR  91 > OR = 60 mL/min/1 73m2    SL AMB BUN/CREATININE RATIO NOT APPLICABLE 6 - 22 (calc)    SL AMB SODIUM 139 135 - 146 mmol/L    SL AMB POTASSIUM 4 6 3 5 - 5 3 mmol/L    SL AMB CHLORIDE 101 98 - 110 mmol/L    SL AMB CARBON DIOXIDE 29 20 - 31 mmol/L    SL AMB CALCIUM 9 9 8 6 - 10 4 mg/dL    SL AMB PROTEIN, TOTAL 7 1 6 1 - 8 1 g/dL    Serum Albumin 4 7 3 6 - 5 1 g/dL    SL AMB GLOBULIN 2 4 1 9 - 3 7 g/dL (calc)    SL AMB ALBUMIN/GLOBULIN RATIO 2 0 1 0 - 2 5 (calc)    SL AMB BILIRUBIN, TOTAL 0 7 0 2 - 1 2 mg/dL    SL AMB ALKALINE PHOSPHATASE 59 33 - 130 U/L    SL AMB AST 19 10 - 35 U/L    SL AMB ALT 24 6 - 29 U/L   CBC and differential    Collection Time: 18 10:08 AM   Result Value Ref Range    SL AMB LAB WHITE BLOOD CELL COUNT 8 8 3 8 - 10 8 Thousand/uL    SL AMB LAB RED BLOOD CELLS 5 04 3 80 - 5 10 Million/uL    Hemoglobin 15 4 11 7 - 15 5 g/dL    Hematocrit 44 9 35 0 - 45 0 %    MCV 89 1 80 0 - 100 0 fL    MCH 30 6 27 0 - 33 0 pg    MCHC 34 3 32 0 - 36 0 g/dL    RDW 12 4 11 0 - 15 0 %    Platelet Count 030 526 - 400 Thousand/uL    SL AMB MPV 10 0 7 5 - 12 5 fL    Neutrophils (Absolute) 5,201 1,500 - 7,800 cells/uL    Lymphocytes (Absolute) 2,869 850 - 3,900 cells/uL    Monocytes (Absolute) 502 200 - 950 cells/uL    Eosinophils (Absolute) 158 15 - 500 cells/uL    Basophils (Absolute) 70 0 - 200 cells/uL    Neutrophils 59 1 %    Lymphocytes 32 6 %    Monocytes 5 7 %    Eosinophils 1 8 %    Basophils 0 8 %   TSH, 3rd generation    Collection Time: 18 10:08 AM   Result Value Ref Range    TSH 1 50 0 40 - 4 50 mIU/L         SUBJECTIVE:  Alyce Deluna is a 63 y o  female who presents today with a chief complaint of Follow-up (labs done 18)    Patient here for follow up  Her triglycerides are elevated as is her ldl;  Her father was dm, her mother had mi in 66's-  in her 80's; She has sore throat and runny nose and left swollen lymph node; Happens yearly in July for her  she has a sore throat, post nasal drip       Review of Systems   Constitutional: Negative  HENT: Positive for postnasal drip and sore throat  Eyes: Negative  Respiratory: Negative  Cardiovascular: Negative  Gastrointestinal: Negative  Endocrine: Negative  Genitourinary: Negative  Musculoskeletal: Negative  Skin: Negative  Allergic/Immunologic: Negative  Neurological: Negative  Hematological: Positive for adenopathy (swollen ln on left )     Psychiatric/Behavioral: Negative  I have reviewed the patient's PMH, Social History, Medication List and Allergies  OBJECTIVE:  /76   Pulse 80   Temp 98 4 °F (36 9 °C)   Resp 16   Ht 5' 6 3" (1 684 m)   Wt 90 7 kg (200 lb)   BMI 31 99 kg/m²   Physical Exam   Constitutional: She is oriented to person, place, and time  She appears well-developed and well-nourished  HENT:   Head: Normocephalic and atraumatic  Right Ear: External ear normal    Left Ear: External ear normal    Nose: Nose normal    Mouth/Throat: Oropharyngeal exudate (erythema ntoed   ) present  Eyes: Conjunctivae and EOM are normal  Pupils are equal, round, and reactive to light  Neck: Normal range of motion  Neck supple  No JVD present  No tracheal deviation present  No thyromegaly present  Cardiovascular: Normal rate, regular rhythm, normal heart sounds and intact distal pulses  No murmur heard  Pulmonary/Chest: Effort normal and breath sounds normal  No respiratory distress  She has no wheezes  Abdominal: Soft  Bowel sounds are normal  She exhibits no distension  There is no tenderness  Musculoskeletal: Normal range of motion  She exhibits no edema or tenderness  Neurological: She is alert and oriented to person, place, and time  She has normal reflexes  No cranial nerve deficit  Skin: Skin is warm and dry  No rash noted  No erythema  Psychiatric: She has a normal mood and affect  Her behavior is normal  Judgment and thought content normal    Nursing note and vitals reviewed

## 2018-07-11 NOTE — ASSESSMENT & PLAN NOTE
Pt with hx of osteoproosis  She ahs been on boniva for since 1/2017  She is doing well and her bone density has increased  Check dexa in 2 years  jose patel

## 2018-07-20 ENCOUNTER — TELEPHONE (OUTPATIENT)
Dept: FAMILY MEDICINE CLINIC | Facility: CLINIC | Age: 63
End: 2018-07-20

## 2018-11-13 ENCOUNTER — TELEPHONE (OUTPATIENT)
Dept: FAMILY MEDICINE CLINIC | Facility: CLINIC | Age: 63
End: 2018-11-13

## 2018-11-13 DIAGNOSIS — H04.129 DRY EYE: ICD-10-CM

## 2018-11-13 NOTE — TELEPHONE ENCOUNTER
Patient should call [facility] and cancel automatic refill  Also the next time they need a refill have patient only as for 14 days supply, that should fix problem  We have not refilled Rx since July    There is no way to change in the chart the quantity they will receive unless its during a refill request

## 2018-11-13 NOTE — TELEPHONE ENCOUNTER
pts  calling to ask if we could correct the qty of valacyclovir the pt receives  [facility] day [facility] and the pt only uses it for 2weeks at a time  Since they cant get [facility] to stop [facility] it, can we change the qty to reflect 14days worth at a time

## 2018-11-14 RX ORDER — CYCLOSPORINE 0.5 MG/ML
EMULSION OPHTHALMIC
Qty: 60 ML | Refills: 3 | Status: SHIPPED | OUTPATIENT
Start: 2018-11-14 | End: 2019-07-10 | Stop reason: SDUPTHER

## 2018-11-20 ENCOUNTER — OFFICE VISIT (OUTPATIENT)
Dept: FAMILY MEDICINE CLINIC | Facility: OTHER | Age: 63
End: 2018-11-20
Payer: COMMERCIAL

## 2018-11-20 VITALS
OXYGEN SATURATION: 97 % | HEIGHT: 67 IN | TEMPERATURE: 99.5 F | SYSTOLIC BLOOD PRESSURE: 126 MMHG | WEIGHT: 196.5 LBS | HEART RATE: 68 BPM | BODY MASS INDEX: 30.84 KG/M2 | DIASTOLIC BLOOD PRESSURE: 82 MMHG

## 2018-11-20 DIAGNOSIS — K52.1 GASTROENTERITIS DUE TO TOXIN IN FOOD: ICD-10-CM

## 2018-11-20 DIAGNOSIS — Z23 NEED FOR INFLUENZA VACCINATION: Primary | ICD-10-CM

## 2018-11-20 PROCEDURE — 3008F BODY MASS INDEX DOCD: CPT | Performed by: NURSE PRACTITIONER

## 2018-11-20 PROCEDURE — 1036F TOBACCO NON-USER: CPT | Performed by: NURSE PRACTITIONER

## 2018-11-20 PROCEDURE — 90471 IMMUNIZATION ADMIN: CPT

## 2018-11-20 PROCEDURE — 99214 OFFICE O/P EST MOD 30 MIN: CPT | Performed by: NURSE PRACTITIONER

## 2018-11-20 PROCEDURE — 90682 RIV4 VACC RECOMBINANT DNA IM: CPT

## 2018-11-20 NOTE — PROGRESS NOTES
Assessment/Plan:         Problem List Items Addressed This Visit     None      Visit Diagnoses       Gastroenteritis, food borne (Sushi)  --Symptoms now improved  Advise continued fluids, bland solids as tolerated  Yogurt/probiotic  --Avoid further Immodium d/t potential for rebound constipation and/or prolonged infection  --Call for incomplete resolution, recurrent  Need for influenza vaccination    -  Primary    Relevant Orders    influenza vaccine, [id number], quadrivalent, recombinant, PF, 0 5 mL, for patients 18 yr+ (FLUBLOK) (Completed)            Subjective:      Patient ID: Tara Barajas is a 66 y o  female  [facility] patient  Here with complaints of watery diarrhea, generalized abdominal discomfort described as "gas pain" starting within an hour or so of eating raw sushi 4 days ago while at [employer] in [place]  Somewhat better today, with no further abdominal pain or diarrhea  Had some soup without issues  Drinking, urinating adequately  No associated fever/chills, body aches, dizziness, excess thirst, vomiting  Had one episode of painless bright red rectal bleeding which she attributes to her hemorrhoids, but no hematochezia, melena otherwise  Mild allergy symptoms, but no sore throat  Took 2 doses of Immodium three days ago  Didn't seem to help  No other OTC meds  No other known food or infectious precipitants  No recent antibiotics  No recent alcohol use  Denies previous GI conditions, surgeries, food intolerance  Normal colonoscopy this summer  The following portions of the patient's history were reviewed and updated as appropriate: current medications, past family history, past medical history, past social history, past surgical history and problem list     Review of Systems   Constitutional: Negative for fatigue and fever  HENT: Negative for sore throat  Gastrointestinal: Positive for abdominal pain and diarrhea   Negative for nausea, rectal pain and vomiting  Genitourinary: Negative for difficulty urinating  Musculoskeletal: Negative for myalgias  Neurological: Positive for headaches  Objective:      /82 (BP Location: Left arm, Patient Position: Sitting, Cuff Size: Adult)   Pulse 68   Temp 99 5 °F (37 5 °C) (Tympanic)   Ht 5' 7" (1 702 m)   Wt 89 1 kg (196 lb 8 oz)   SpO2 97%   BMI 30 78 kg/m²          Physical Exam   Constitutional: She is oriented to person, place, and time  She appears well-developed  No distress  HENT:   Head: Normocephalic  Cardiovascular: Normal rate and regular rhythm  Pulmonary/Chest: Effort normal and breath sounds normal    Abdominal: Soft  Bowel sounds are normal  She exhibits no distension and no mass  There is no tenderness  There is no rebound and no guarding  No CVA tenderness  Lymphadenopathy:     She has no cervical adenopathy  Neurological: She is alert and oriented to person, place, and time  Psychiatric: She has a normal mood and affect

## 2018-11-20 NOTE — PATIENT INSTRUCTIONS
Gastroenteritis   WHAT YOU NEED TO KNOW:   Gastroenteritis, or stomach flu, is an infection of the stomach and intestines  DISCHARGE INSTRUCTIONS:   Call 911 for any of the following:   · You have trouble breathing or a very fast pulse  Return to the emergency department if:   · You see blood in your diarrhea  · You cannot stop vomiting  · You have not urinated for 12 hours  · You feel like you are going to faint  Contact your healthcare provider if:   · You have a fever  · You continue to vomit or have diarrhea, even after treatment  · You see worms in your diarrhea  · Your mouth or eyes are dry  You are not urinating as much or as often  · You have questions or concerns about your condition or care  Medicines:   · Medicines  may be given to stop vomiting or diarrhea, decrease abdominal cramps, or treat an infection  · Take your medicine as directed  Contact your healthcare provider if you think your medicine is not helping or if you have side effects  Tell him or her if you are allergic to any medicine  Keep a list of the medicines, vitamins, and herbs you take  Include the amounts, and when and why you take them  Bring the list or the pill bottles to follow-up visits  Carry your medicine list with you in case of an emergency  Manage your symptoms:   · Drink liquids as directed  Ask your healthcare provider how much liquid to drink each day, and which liquids are best for you  You may also need to drink an oral rehydration solution (ORS)  An ORS has the right amounts of sugar, salt, and minerals in water to replace body fluids  · Eat bland foods  When you feel hungry, begin eating soft, bland foods  Examples are bananas, clear soup, potatoes, and applesauce  Do not have dairy products, alcohol, sugary drinks, or drinks with caffeine until you feel better  · Rest as much as possible  Slowly start to do more each day when you begin to feel better    Prevent the spread of gastroenteritis:  Gastroenteritis can spread easily  Keep yourself, your family, and your surroundings clean to help prevent the spread of gastroenteritis:  · Wash your hands often  Use soap and water  Wash your hands after you use the bathroom, change a child's diapers, or sneeze  Wash your hands before you prepare or eat food  · Clean surfaces and do laundry often  Wash your clothes and towels separately from the rest of the laundry  Clean surfaces in your home with antibacterial  or bleach  · Clean food thoroughly and cook safely  Wash raw vegetables before you cook  Cook meat, fish, and eggs fully  Do not use the same dishes for raw meat as you do for other foods  Refrigerate any leftover food immediately  · Be aware when you camp or travel  Drink only clean water  Do not drink from rivers or lakes unless you purify or boil the water first  When you travel, drink bottled water and do not add ice  Do not eat fruit that has not been peeled  Do not eat raw fish or meat that is not fully cooked  Follow up with your healthcare provider as directed:  Write down your questions so you remember to ask them during your visits  © 2017 [employer] Information is for End User's use only and may not be sold, redistributed or otherwise used for commercial purposes  All illustrations and images included in CareNotes® are the copyrighted property of A D A M , Inc  or Thomas Covarrubias  The above information is an  only  It is not intended as medical advice for individual conditions or treatments  Talk to your doctor, nurse or pharmacist before following any medical regimen to see if it is safe and effective for you

## 2018-11-25 DIAGNOSIS — M81.0 AGE-RELATED OSTEOPOROSIS WITHOUT CURRENT PATHOLOGICAL FRACTURE: Primary | ICD-10-CM

## 2018-11-25 RX ORDER — IBANDRONATE SODIUM 150 MG/1
TABLET, FILM COATED ORAL
Qty: 3 TABLET | Refills: 3 | Status: SHIPPED | OUTPATIENT
Start: 2018-11-25 | End: 2019-11-10 | Stop reason: SDUPTHER

## 2018-12-05 ENCOUNTER — TRANSCRIBE ORDERS (OUTPATIENT)
Dept: ADMINISTRATIVE | Facility: HOSPITAL | Age: 63
End: 2018-12-05

## 2018-12-05 ENCOUNTER — TELEPHONE (OUTPATIENT)
Dept: FAMILY MEDICINE CLINIC | Facility: CLINIC | Age: 63
End: 2018-12-05

## 2018-12-05 DIAGNOSIS — R19.7 DIARRHEA OF PRESUMED INFECTIOUS ORIGIN: Primary | ICD-10-CM

## 2018-12-05 PROBLEM — Z83.1 FAMILY HISTORY OF COLD SORES: Status: RESOLVED | Noted: 2018-07-11 | Resolved: 2018-12-05

## 2018-12-05 PROBLEM — J06.9 VIRAL UPPER RESPIRATORY TRACT INFECTION: Status: RESOLVED | Noted: 2018-07-11 | Resolved: 2018-12-05

## 2018-12-05 PROBLEM — H04.129 DRY EYE: Status: RESOLVED | Noted: 2018-07-11 | Resolved: 2018-12-05

## 2018-12-06 ENCOUNTER — APPOINTMENT (OUTPATIENT)
Dept: LAB | Facility: MEDICAL CENTER | Age: 63
End: 2018-12-06
Payer: COMMERCIAL

## 2018-12-06 DIAGNOSIS — R19.7 DIARRHEA OF PRESUMED INFECTIOUS ORIGIN: ICD-10-CM

## 2018-12-06 PROCEDURE — 87493 C DIFF AMPLIFIED PROBE: CPT

## 2018-12-06 PROCEDURE — 87505 NFCT AGENT DETECTION GI: CPT

## 2018-12-06 PROCEDURE — 87209 SMEAR COMPLEX STAIN: CPT

## 2018-12-06 PROCEDURE — 87177 OVA AND PARASITES SMEARS: CPT

## 2018-12-07 ENCOUNTER — TELEPHONE (OUTPATIENT)
Dept: FAMILY MEDICINE CLINIC | Facility: CLINIC | Age: 63
End: 2018-12-07

## 2018-12-07 DIAGNOSIS — A04.5: Primary | ICD-10-CM

## 2018-12-07 LAB
C DIFF TOX GENS STL QL NAA+PROBE: NORMAL
CAMPYLOBACTER DNA SPEC NAA+PROBE: DETECTED
SALMONELLA DNA SPEC QL NAA+PROBE: ABNORMAL
SHIGA TOXIN STX GENE SPEC NAA+PROBE: ABNORMAL
SHIGELLA DNA SPEC QL NAA+PROBE: ABNORMAL

## 2018-12-07 RX ORDER — AZITHROMYCIN 500 MG/1
500 TABLET, FILM COATED ORAL DAILY
Qty: 5 TABLET | Refills: 0 | Status: SHIPPED | OUTPATIENT
Start: 2018-12-07 | End: 2018-12-12

## 2018-12-07 NOTE — TELEPHONE ENCOUNTER
Please call Shanti Beckham and let her know that her labs showed: she has campylobacter infection, if she has bloody diarrhea or if it has been going on > 1 week, she needs an abx, if not, she can just hydrate and monitor  Thank you! Appointment on 12/06/2018   Component Date Value Ref Range Status    C difficile toxin by PCR 12/06/2018 NEGATIVE for C difficle toxin by PCR  NEGATIVE for C difficle toxin by PCR    Final    Salmonella sp PCR 12/06/2018 None Detected  None Detected Final    Shigella sp/Enteroinvasive E  coli* 12/06/2018 None Detected  None Detected Final    Campylobacter sp (jejuni and coli)* 12/06/2018 Detected* None Detected Final    Shiga toxin 1/Shiga toxin 2 genes * 12/06/2018 None Detected  None Detected Final

## 2018-12-09 LAB — O+P STL CONC: NORMAL

## 2019-01-09 LAB
ALBUMIN SERPL-MCNC: 4.5 G/DL (ref 3.6–5.1)
ALBUMIN/GLOB SERPL: 2 (CALC) (ref 1–2.5)
ALP SERPL-CCNC: 57 U/L (ref 33–130)
ALT SERPL-CCNC: 20 U/L (ref 6–29)
AST SERPL-CCNC: 15 U/L (ref 10–35)
BILIRUB SERPL-MCNC: 0.5 MG/DL (ref 0.2–1.2)
BUN SERPL-MCNC: 14 MG/DL (ref 7–25)
BUN/CREAT SERPL: ABNORMAL (CALC) (ref 6–22)
CALCIUM SERPL-MCNC: 10 MG/DL (ref 8.6–10.4)
CHLORIDE SERPL-SCNC: 102 MMOL/L (ref 98–110)
CHOLEST SERPL-MCNC: 224 MG/DL
CHOLEST/HDLC SERPL: 4.9 (CALC)
CO2 SERPL-SCNC: 31 MMOL/L (ref 20–32)
CREAT SERPL-MCNC: 0.75 MG/DL (ref 0.5–0.99)
GLOBULIN SER CALC-MCNC: 2.3 G/DL (CALC) (ref 1.9–3.7)
GLUCOSE SERPL-MCNC: 107 MG/DL (ref 65–99)
HDLC SERPL-MCNC: 46 MG/DL
LDLC SERPL CALC-MCNC: 147 MG/DL (CALC)
NONHDLC SERPL-MCNC: 178 MG/DL (CALC)
POTASSIUM SERPL-SCNC: 4.4 MMOL/L (ref 3.5–5.3)
PROT SERPL-MCNC: 6.8 G/DL (ref 6.1–8.1)
SL AMB EGFR AFRICAN AMERICAN: 100 ML/MIN/1.73M2
SL AMB EGFR NON AFRICAN AMERICAN: 86 ML/MIN/1.73M2
SODIUM SERPL-SCNC: 139 MMOL/L (ref 135–146)
TRIGL SERPL-MCNC: 172 MG/DL

## 2019-01-17 ENCOUNTER — OFFICE VISIT (OUTPATIENT)
Dept: FAMILY MEDICINE CLINIC | Facility: CLINIC | Age: 64
End: 2019-01-17
Payer: COMMERCIAL

## 2019-01-17 VITALS
HEIGHT: 67 IN | SYSTOLIC BLOOD PRESSURE: 122 MMHG | RESPIRATION RATE: 16 BRPM | WEIGHT: 197 LBS | HEART RATE: 78 BPM | BODY MASS INDEX: 30.92 KG/M2 | TEMPERATURE: 97.5 F | DIASTOLIC BLOOD PRESSURE: 74 MMHG

## 2019-01-17 DIAGNOSIS — G25.0 ESSENTIAL TREMOR: ICD-10-CM

## 2019-01-17 DIAGNOSIS — I10 ESSENTIAL HYPERTENSION: Primary | ICD-10-CM

## 2019-01-17 DIAGNOSIS — M81.0 AGE-RELATED OSTEOPOROSIS WITHOUT CURRENT PATHOLOGICAL FRACTURE: ICD-10-CM

## 2019-01-17 DIAGNOSIS — R73.01 IFG (IMPAIRED FASTING GLUCOSE): ICD-10-CM

## 2019-01-17 DIAGNOSIS — E78.2 MIXED HYPERLIPIDEMIA: ICD-10-CM

## 2019-01-17 DIAGNOSIS — E55.9 VITAMIN D DEFICIENCY: ICD-10-CM

## 2019-01-17 DIAGNOSIS — E04.1 LEFT THYROID NODULE: ICD-10-CM

## 2019-01-17 DIAGNOSIS — E66.09 CLASS 1 OBESITY DUE TO EXCESS CALORIES WITH SERIOUS COMORBIDITY AND BODY MASS INDEX (BMI) OF 30.0 TO 30.9 IN ADULT: ICD-10-CM

## 2019-01-17 PROBLEM — E78.01 FAMILIAL HYPERCHOLESTEROLEMIA: Status: RESOLVED | Noted: 2018-07-11 | Resolved: 2019-01-17

## 2019-01-17 PROBLEM — E66.811 CLASS 1 OBESITY DUE TO EXCESS CALORIES WITH SERIOUS COMORBIDITY AND BODY MASS INDEX (BMI) OF 30.0 TO 30.9 IN ADULT: Status: ACTIVE | Noted: 2019-01-17

## 2019-01-17 PROCEDURE — 1036F TOBACCO NON-USER: CPT | Performed by: FAMILY MEDICINE

## 2019-01-17 PROCEDURE — 3008F BODY MASS INDEX DOCD: CPT | Performed by: FAMILY MEDICINE

## 2019-01-17 PROCEDURE — 99214 OFFICE O/P EST MOD 30 MIN: CPT | Performed by: FAMILY MEDICINE

## 2019-01-17 PROCEDURE — 3074F SYST BP LT 130 MM HG: CPT | Performed by: FAMILY MEDICINE

## 2019-01-17 PROCEDURE — 3078F DIAST BP <80 MM HG: CPT | Performed by: FAMILY MEDICINE

## 2019-01-17 RX ORDER — NADOLOL 20 MG/1
30 TABLET ORAL
Qty: 135 TABLET | Refills: 0
Start: 2019-01-17 | End: 2019-04-01 | Stop reason: SDUPTHER

## 2019-01-17 NOTE — ASSESSMENT & PLAN NOTE
July 2018: Unchanged tiny predominantly cystic subcentimeter lesions in the left thyroid    No dominant nodules meeting criteria for biopsy based on recent published guidelines for American thyroid Association are identified

## 2019-01-17 NOTE — PROGRESS NOTES
FAMILY MEDICINE PROGRESS NOTE  Noberto Hamman 66 y o  female   DATE: January 17, 2019     ASSESSMENT and PLAN:  Noberto Hamman is a 66 y o  female with:     Mixed hyperlipidemia  Last Lipid Panel:  Lab Results   Component Value Date    CHOLESTEROL 224 (H) 01/09/2019    HDL 46 (L) 01/09/2019    LDLC 147 (H) 01/09/2019    TRIG 172 (H) 01/09/2019     The 10-year ASCVD risk score (Roselynn Curling , et al , 2013) is: 5 4%    Values used to calculate the score:      Age: 66 years      Sex: Female      Is Non- : No      Diabetic: No      Tobacco smoker: No      Systolic Blood Pressure: 105 mmHg      Is BP treated: Yes      HDL Cholesterol: 46 mg/dL      Total Cholesterol: 224 mg/dL    Recommended healthy diet, no statin yet  Cont red yeast rice    IFG (impaired fasting glucose)    Recommended weight loss and carb restriction    Essential hypertension  BP Readings from Last 3 Encounters:   01/17/19 122/74   11/20/18 126/82   07/11/18 132/76       Results from last 6 Months  Lab Units 01/09/19  0953   POTASSIUM mmol/L 4 4   CHLORIDE mmol/L 102   CO2 mmol/L 31   BUN mg/dL 14   SL AMB CALCIUM mg/dL 10 0   ALK PHOS U/L 57     Controlled on current regimen:  Nadolol 20mg    Class 1 obesity due to excess calories with serious comorbidity and body mass index (BMI) of 30 0 to 30 9 in adult  Wt Readings from Last 3 Encounters:   01/17/19 89 4 kg (197 lb)   11/20/18 89 1 kg (196 lb 8 oz)   07/11/18 90 7 kg (200 lb)     BMI Counseling: Body mass index is 30 85 kg/m²  Discussed the patient's BMI with her  The BMI is above average  BMI counseling and education was provided to the patient  Nutrition recommendations include reducing portion sizes, decreasing overall calorie intake, 3-5 servings of fruits/vegetables daily, consuming healthier snacks, decreasing soda and/or juice intake, moderation in carbohydrate intake and reducing intake of cholesterol   Exercise recommendations include moderate aerobic physical activity for 150 minutes/week and exercising 3-5 times per week  Vitamin D deficiency  Currently on Vit D 4000 IU daily OTC    Left thyroid nodule  July 2018: Unchanged tiny predominantly cystic subcentimeter lesions in the left thyroid  No dominant nodules meeting criteria for biopsy based on recent published guidelines for American thyroid Association are identified     Age-related osteoporosis without current pathological fracture  Osteoporosis diagnosed in June 2016 with T score of -2 6, started on Ibandronate (Boniva 450mg monthly) in Jan 2017 and repeat DEXA in 2018 showed improvement to osteopenia    Essential tremor  Still having mild intention tremors, though improved with Nadolol  Increase Nadolol from 20mg to 30mg daily and call with update in 1 month  Will monitor HR on her smartwatch and call if she has bradycardic episodes  May also help with palpitations that seem to be stress related  If palpitations persist, check Holter  SUBJECTIVE:  Noberto Hamman is a 66 y o  female who presents today with a chief complaint of Follow-up (blood work)  Noberto Hamman is here for a 6 month follow-up and to establish with me as a new PCP  The active chronic medical problems and medications are as below:   1  HTN- on nadolol for tremors  2  Essential tremors- still gets tremors even with nadolol, intention tremor, worse on left hand, but overall has improved  3  HLD- uncontrolled without meds, trying fish oil and red yeast rice    Acute complaints:  1  Heart palpitations for the past 20 years  Has been stressed lately, does notice that she has been having more when she is stressed  She is a  and has less palpitations when she is working because her life is more structured  Review of Systems   Constitutional: Negative for diaphoresis  Eyes: Negative for visual disturbance  Respiratory: Negative for shortness of breath  Cardiovascular: Positive for palpitations   Negative for chest pain  Neurological: Negative for dizziness and light-headedness  I have reviewed the patient's PMH, Social History, Medication List and Allergies as appropriate  OBJECTIVE:  /74   Pulse 78   Temp 97 5 °F (36 4 °C)   Resp 16   Ht 5' 7" (1 702 m)   Wt 89 4 kg (197 lb)   BMI 30 85 kg/m²    Physical Exam   Constitutional: She appears well-developed and well-nourished  No distress  Cardiovascular: Normal rate, regular rhythm and normal heart sounds  Pulmonary/Chest: Effort normal and breath sounds normal  No respiratory distress  She has no wheezes  She has no rales  Skin: She is not diaphoretic  Vitals reviewed  Orders Only on 01/09/2019   Component Date Value Ref Range Status    Total Cholesterol 01/09/2019 224* <200 mg/dL Final    HDL 01/09/2019 46* >50 mg/dL Final    Triglycerides 01/09/2019 172* <150 mg/dL Final    LDL Direct 01/09/2019 147* mg/dL (calc) Final    Comment: Reference range: <100     Desirable range <100 mg/dL for primary prevention;    <70 mg/dL for patients with CHD or diabetic patients   with > or = 2 CHD risk factors  LDL-C is now calculated using the Roque-Farnsworth   calculation, which is a validated novel method providing   better accuracy than the Friedewald equation in the   estimation of LDL-C  [place] [id number];227(68): 9070-6739   (http://[employer]/faq/IXQ605)      Chol HDLC Ratio 01/09/2019 4 9  <5 0 (calc) Final    Non-HDL Cholesterol 01/09/2019 178* <130 mg/dL (calc) Final    Comment: For patients with diabetes plus 1 major ASCVD risk   factor, treating to a non-HDL-C goal of <100 mg/dL   (LDL-C of <70 mg/dL) is considered a therapeutic   option        Glucose, Random 01/09/2019 107* 65 - 99 mg/dL Final    Comment:               Fasting reference interval     For someone without known diabetes, a glucose value  between 100 and 125 mg/dL is consistent with  prediabetes and should be confirmed with a  follow-up test          BUN 01/09/2019 14  7 - 25 mg/dL Final    Creatinine 01/09/2019 0 75  0 50 - 0 99 mg/dL Final    Comment: For patients >52years of age, the reference limit  for Creatinine is approximately 13% higher for people  identified as -American   eGFR Non  01/09/2019 86  > OR = 60 mL/min/1 73m2 Final    SL AMB EGFR  01/09/2019 100  > OR = 60 mL/min/1 73m2 Final    SL AMB BUN/CREATININE RATIO 69/08/2672 NOT APPLICABLE  6 - 22 (calc) Final    Sodium 01/09/2019 139  135 - 146 mmol/L Final    Potassium 01/09/2019 4 4  3 5 - 5 3 mmol/L Final    Chloride 01/09/2019 102  98 - 110 mmol/L Final    CO2 01/09/2019 31  20 - 32 mmol/L Final    SL AMB CALCIUM 01/09/2019 10 0  8 6 - 10 4 mg/dL Final    SL AMB PROTEIN, TOTAL 01/09/2019 6 8  6 1 - 8 1 g/dL Final    Albumin 01/09/2019 4 5  3 6 - 5 1 g/dL Final    Globulin 01/09/2019 2 3  1 9 - 3 7 g/dL (calc) Final    Albumin/Globulin Ratio 01/09/2019 2 0  1 0 - 2 5 (calc) Final    TOTAL BILIRUBIN 01/09/2019 0 5  0 2 - 1 2 mg/dL Final    Alkaline Phosphatase 01/09/2019 57  33 - 130 U/L Final    SL AMB AST 01/09/2019 15  10 - 35 U/L Final    SL AMB ALT 01/09/2019 20  6 - 29 U/L Final       Cristianhi FRANK Corcoran MD    Note: Portions of the record may have been created with voice recognition software  Occasional wrong word or "sound a like" substitutions may have occurred due to the inherent limitations of voice recognition software  Read the chart carefully and recognize, using context, where substitutions have occurred

## 2019-01-17 NOTE — ASSESSMENT & PLAN NOTE
Wt Readings from Last 3 Encounters:   01/17/19 89 4 kg (197 lb)   11/20/18 89 1 kg (196 lb 8 oz)   07/11/18 90 7 kg (200 lb)     BMI Counseling: Body mass index is 30 85 kg/m²  Discussed the patient's BMI with her  The BMI is above average  BMI counseling and education was provided to the patient  Nutrition recommendations include reducing portion sizes, decreasing overall calorie intake, 3-5 servings of fruits/vegetables daily, consuming healthier snacks, decreasing soda and/or juice intake, moderation in carbohydrate intake and reducing intake of cholesterol  Exercise recommendations include moderate aerobic physical activity for 150 minutes/week and exercising 3-5 times per week

## 2019-01-17 NOTE — ASSESSMENT & PLAN NOTE
Last Lipid Panel:  Lab Results   Component Value Date    CHOLESTEROL 224 (H) 01/09/2019    HDL 46 (L) 01/09/2019    LDLC 147 (H) 01/09/2019    TRIG 172 (H) 01/09/2019     The 10-year ASCVD risk score (Sam Joe et al , 2013) is: 5 4%    Values used to calculate the score:      Age: 66 years      Sex: Female      Is Non- : No      Diabetic: No      Tobacco smoker: No      Systolic Blood Pressure: 677 mmHg      Is BP treated: Yes      HDL Cholesterol: 46 mg/dL      Total Cholesterol: 224 mg/dL    Recommended healthy diet, no statin yet  Cont red yeast rice

## 2019-01-17 NOTE — ASSESSMENT & PLAN NOTE
Osteoporosis diagnosed in June 2016 with T score of -2 6, started on Ibandronate (Boniva 450mg monthly) in Jan 2017 and repeat DEXA in 2018 showed improvement to osteopenia

## 2019-01-17 NOTE — ASSESSMENT & PLAN NOTE
Still having mild intention tremors, though improved with Nadolol  Increase Nadolol from 20mg to 30mg daily and call with update in 1 month  Will monitor HR on her smartwatch and call if she has bradycardic episodes  May also help with palpitations that seem to be stress related  If palpitations persist, check Holter

## 2019-01-17 NOTE — PATIENT INSTRUCTIONS
Cholesterol and Your Health   AMBULATORY CARE:   Cholesterol  is a waxy, fat-like substance  Cholesterol is made by your body, but also comes from certain foods you eat  Your body uses cholesterol to make hormones and new cells  Your body also uses cholesterol to protect nerves  Cholesterol comes from foods such as meat and dairy products  Your total cholesterol level is made up by LDL cholesterol, HDL cholesterol, and triglycerides:  · LDL cholesterol  is called bad cholesterol  because it forms plaque in your arteries  As plaque builds up, your arteries become narrow, and less blood flows through  When plaque decreases blood flow to your heart, you may have chest pain  If plaque completely blocks an artery that bring blood to your heart, you may have a heart attack  Plaque can break off and form blood clots  Blood clots may block arteries in your brain and cause a stroke  · HDL cholesterol  is called good cholesterol  because it helps remove LDL cholesterol from your arteries  It does this by attaching to LDL cholesterol and carrying it to your liver  Your liver breaks down LDL cholesterol so your body can get rid of it  High levels of HDL cholesterol can help prevent a heart attack and stroke  Low levels of HDL cholesterol can increase your risk for heart disease, heart attack, and stroke  · Triglycerides  are a type of fat that store energy from foods you eat  High levels of triglycerides also cause plaque buildup  This can increase your risk for a heart attack or stroke  If your triglyceride level is high, your LDL cholesterol level may also be high  How food affects your cholesterol levels:   · Unhealthy fats  increase LDL cholesterol and triglyceride levels in your blood  They are found in foods high in cholesterol, saturated fat, and trans fat:     ¨ Cholesterol  is found in eggs, dairy, and meat  ¨ Saturated fat  is found in butter, cheese, ice cream, whole milk, and coconut oil  Saturated fat is also found in meat, such as sausage, hot dogs, and bologna  ¨ Trans fat  is found in liquid oils and is used in fried and baked foods  Foods that contain trans fats include chips, crackers, muffins, sweet rolls, microwave popcorn, and cookies  · Healthy fats,  also called unsaturated fats, help lower LDL cholesterol and triglyceride levels  Healthy fats include the following:     ¨ Monounsaturated fats  are found in foods such as olive oil, canola oil, avocado, nuts, and olives  ¨ Polyunsaturated fats,  such as omega 3 fats, are found in fish, such as salmon, trout, and tuna  They can also be found in plant foods such as flaxseed, walnuts, and soybeans  Other things that affect your cholesterol levels:   · Smoking cigarettes    · Being overweight or obese     · Drinking large amounts of alcohol    · Not enough exercise or no exercise    · Certain genes passed from your parents to you  What you need to know about having your cholesterol levels checked: Adults 21to 39years of age should have their cholesterol levels checked every 4 to 6 years  Adults 45 years and older should have their cholesterol checked every 1 to 2 years  You may need your cholesterol checked more often, or at a younger age, if you have risk factors for heart disease  You may also need to have your cholesterol checked more often if you have other health conditions, such as diabetes  Blood tests are used to check cholesterol levels  Blood tests measure your levels of triglycerides, LDL cholesterol, and HDL cholesterol  Cholesterol level goals: Your cholesterol level goal may depend on your risk for heart disease  It may also depend on your age and other health conditions  Ask your healthcare provider if the following goals are right for you:  · Your total cholesterol level  should be less than 200 mg/dL  This number may also depend on your HDL and LDL cholesterol goals       · Your LDL cholesterol level  should be less than 130 mg/dL  · Your HDL cholesterol level  should be 60 mg/dL or higher  · Your triglyceride level  should be less than 150 mg/dL  Treatment for high cholesterol:  Treatment for high cholesterol will also decrease your risk of heart disease, heart attack, and stroke  Treatment may include any of the following:  · Medicines  may be given to lower your LDL cholesterol, triglyceride levels, or total cholesterol level  You may need medicines to lower your cholesterol if any of the following is true:     ¨ You have a history of stroke, TIA, unstable angina, or a heart attack    ¨ Your LDL cholesterol level is 190 mg/dL or higher    ¨ You are age 36to 76years of age, have diabetes, and your LDL cholesterol is 70 mg/dL or higher    ¨ You are age 36to 76years of age, have risk factors for heart disease, and your LDL cholesterol is 70 mg/dL or higher    · Lifestyle changes  include changes to your diet, exercise, weight loss, and quitting smoking  It also includes decreasing the amount of alcohol you drink  · Supplements  include fish oil, red yeast rice, and garlic  Fish oil may help lower your triglyceride and LDL cholesterol levels  It may also increase your HDL cholesterol level  Red yeast rice may help decrease your total cholesterol level and LDL cholesterol level  Garlic may help lower your total cholesterol level  Do not take these supplements without talking to your healthcare provider  Nutrition to help lower your cholesterol levels:  A registered dietitian can help you create a healthy eating plan  Read food labels and choose foods low in saturated fat, trans fats, and cholesterol  · Decrease the total amount of fat you eat  Choose lean meats, fat-free or 1% fat milk, and low-fat dairy products, such as yogurt and cheese  Try to limit or avoid red meats  Limit or do not eat fried foods or baked goods such as cookies  · Replace unhealthy fats with healthy fats    Cook foods in olive oil or canola oil  Choose soft margarines that are low in saturated fat and trans fat  Seeds, nuts, and avocados are other examples of healthy fats  · Eat foods with omega-3 fats  Examples include salmon, tuna, mackerel, walnuts, and flaxseed  Eat fish 2 times per week  Children and pregnant women should not eat fish that have high levels of mercury, such as shark, swordfish, and thu mackerel  · Increase the amount of plant-based foods you eat  Plant-based foods are low in cholesterol and fat  Eating more of these foods may help lower your cholesterol and help you lose weight  Examples of plant-based foods includes fruits, vegetables, legumes, and whole grains  Replace milk that contains dairy with almond, soy, or coconut milk  Eat beans and foods with soy for protein instead of meat  Ask your healthcare provider or dietitian for more information on plant-based foods  · Increase the amount of fiber you eat  High-fiber foods can help lower your LDL cholesterol  You should eat between 20 and 30 grams of fiber each day  Eat at least 5 servings of fruits and vegetables each day  Other examples of high-fiber foods include whole-grain or whole-wheat breads, pastas, or cereals, and brown rice  Eat 3 ounces of whole-grain foods each day  Increase fiber slowly  You may have abdominal discomfort, bloating, and gas if you add fiber to your diet too quickly  Lifestyle changes you can make to help lower your cholesterol levels:   · Maintain a healthy weight  Ask your healthcare provider how much you should weigh  Ask him or her to help you create a weight loss plan if you are overweight  Weight loss can decrease your total cholesterol and triglyceride levels  · Exercise regularly  Exercise can help lower your total cholesterol level and maintain a healthy weight  Exercise can also help increase your HDL cholesterol level   Work with your healthcare provider to create an exercise program that is right for you  Get at least 30 minutes of moderate exercise most days of the week  Examples of exercise include brisk walking, swimming, or biking  · Do not smoke  Nicotine and other chemicals in cigarettes and cigars can damage your lungs, heart, and blood vessels  They can also raise your triglyceride levels  Ask your healthcare provider for information if you currently smoke and need help to quit  E-cigarettes or smokeless tobacco still contain nicotine  Talk to your healthcare provider before you use these products  · Limit or do not drink alcohol  Alcohol can increase your triglyceride levels  Ask your healthcare provider if it is safe for you to drink alcohol  Also ask how much is safe for you to drink each day  © 2017 [facility] Information is for End User's use only and may not be sold, redistributed or otherwise used for commercial purposes  All illustrations and images included in CareNotes® are the copyrighted property of [employer]  or [employer]  The above information is an  only  It is not intended as medical advice for individual conditions or treatments  Talk to your doctor, nurse or pharmacist before following any medical regimen to see if it is safe and effective for you

## 2019-01-17 NOTE — ASSESSMENT & PLAN NOTE
BP Readings from Last 3 Encounters:   01/17/19 122/74   11/20/18 126/82   07/11/18 132/76       Results from last 6 Months  Lab Units 01/09/19  0953   POTASSIUM mmol/L 4 4   CHLORIDE mmol/L 102   CO2 mmol/L 31   BUN mg/dL 14   SL AMB CALCIUM mg/dL 10 0   ALK PHOS U/L 57     Controlled on current regimen:  Nadolol 20mg

## 2019-03-22 ENCOUNTER — OFFICE VISIT (OUTPATIENT)
Dept: DERMATOLOGY | Facility: CLINIC | Age: 64
End: 2019-03-22
Payer: COMMERCIAL

## 2019-03-22 DIAGNOSIS — D23.9 DERMATOFIBROMA: ICD-10-CM

## 2019-03-22 DIAGNOSIS — L98.9 UNKNOWN SKIN LESION: Primary | ICD-10-CM

## 2019-03-22 DIAGNOSIS — Z13.89 SCREENING FOR SKIN CONDITION: ICD-10-CM

## 2019-03-22 DIAGNOSIS — L82.1 SEBORRHEIC KERATOSIS: ICD-10-CM

## 2019-03-22 PROCEDURE — 88305 TISSUE EXAM BY PATHOLOGIST: CPT | Performed by: PATHOLOGY

## 2019-03-22 PROCEDURE — 11102 TANGNTL BX SKIN SINGLE LES: CPT | Performed by: DERMATOLOGY

## 2019-03-22 PROCEDURE — 99203 OFFICE O/P NEW LOW 30 MIN: CPT | Performed by: DERMATOLOGY

## 2019-03-22 NOTE — PATIENT INSTRUCTIONS
Skin lesion probably in pigmented lentigo nothing really markedly atypical await results of biopsy see further treatment needed  Dermatofibroma advised that these are benign lesions probably related to some type  Of injury or bug bite no treatment needed  awse  Screening for Dermatologic Disorders: Nothing else of concern noted on complete exam follow up prn  Wound care instructions given to patient

## 2019-03-22 NOTE — PROGRESS NOTES
[address]  [address]  [facility] [address]  [phone]  [phone]     MRN: [id number] : 1955  Encounter: [id number]  Patient Information: Libia Nevarez  Chief complaint:  Skin lesion right lower leg    History of present illness:  56-year-old female without previous history of skin cancer presents because of concerns regarding a pigmented lesion noted on her right leg that has been present for awhile that she feels has changed no other concerns noted other lesions on the left leg  Past Medical History:   Diagnosis Date    Lymphadenopathy     LAST ASSESSED [id number]     Past Surgical History:   Procedure Laterality Date    REDUCTION MAMMAPLASTY      TONSILLECTOMY       Social History   Social History     Substance and Sexual Activity   Alcohol Use Yes    Comment: social     Social History     Substance and Sexual Activity   Drug Use No     Social History     Tobacco Use   Smoking Status Never Smoker   Smokeless Tobacco Never Used     Family History   Problem Relation Age of Onset    Coronary artery disease Mother     Diabetes Mother     Hypertension Mother     Osteoporosis Mother     Diabetes Father     Diabetes Sister     Hypertension Sister     Hypertension Brother      Meds/Allergies   No Known Allergies    Meds:  Prior to Admission medications    Medication Sig Start Date End Date Taking?  Authorizing Provider   Calcium Carbonate-Vitamin D (CALCIUM 600+D) 600-400 MG-UNIT per tablet Take 1 tablet by mouth daily   Yes Historical Provider, MD   ibandronate (BONIVA) 150 MG tablet TAKE 1 TABLET BY MOUTH MONTHLY 18  Yes Brooke Rawls MD   Multiple Vitamins-Minerals (MULTIVITAMIN ADULT PO) Take 1 tablet by mouth daily 13  Yes Historical Provider, MD   nadolol (CORGARD) 20 mg tablet Take 1 5 tablets (30 mg total) by mouth daily at bedtime for 90 days  Patient taking differently: Take 40 mg by mouth daily at bedtime 1/17/19 4/17/19 Yes Sofia Wolf MD   Omega-3 Fatty Acids (FISH OIL) 645 MG CAPS Take 1 capsule by mouth daily   Yes Historical Provider, MD   Red Yeast Rice Extract (RED YEAST RICE PO) Take by mouth   Yes Historical Provider, MD   RESTASIS 0 05 % ophthalmic emulsion INSTILL 1 DROP IN BOTH EYES EVERY 12 HOURS DAILY  11/14/18  Yes Sofia Wolf MD   valACYclovir (VALTREX) 1,000 mg tablet Take 1 tablet (1,000 mg total) by mouth 2 (two) times a day for 14 days 7/11/18 3/22/19 Yes Jose A Muro DO       Subjective:     Review of Systems:    General: negative for - chills, fatigue, fever,  weight gain or weight loss  Psychological: negative for - anxiety, behavioral disorder, concentration difficulties, decreased libido, depression, irritability, memory difficulties, mood swings, sleep disturbances or suicidal ideation  ENT: negative for - hearing difficulties , nasal congestion, nasal discharge, oral lesions, sinus pain, sneezing, sore throat  Allergy and Immunology: negative for - hives, insect bite sensitivity,  Hematological and Lymphatic: negative for - bleeding problems, blood clots,bruising, swollen lymph nodes  Endocrine: negative for - hair pattern changes, hot flashes, malaise/lethargy, mood swings, palpitations, polydipsia/polyuria, skin changes, temperature intolerance or unexpected weight change  Respiratory: negative for - cough, hemoptysis, orthopnea, shortness of breath, or wheezing  Cardiovascular: negative for - chest pain, dyspnea on exertion, edema,  Gastrointestinal: negative for - abdominal pain, nausea/vomiting  Genito-Urinary: negative for - dysuria, incontinence, irregular/heavy menses or urinary frequency/urgency  Musculoskeletal: negative for - gait disturbance, joint pain, joint stiffness, joint swelling, muscle pain, muscular weakness  Dermatological:  As in HPI  Neurological: negative for confusion, dizziness, headaches, impaired coordination/balance, memory loss, numbness/tingling, seizures, speech problems, tremors or weakness       Objective: There were no vitals taken for this visit  Physical Exam:    General Appearance:    Alert, cooperative, no distress   Head:    Normocephalic, without obvious abnormality, atraumatic           Skin:   A full skin exam was performed including scalp, head scalp, eyes, ears, nose, lips, neck, chest, axilla, abdomen, back, buttocks, bilateral upper extremities, bilateral lower extremities, hands, feet, fingers, toes, fingernails, and toenails on the right leg there is 9 mm pigmented macule with slightly erythematous areas noted some telangiectasia normal keratotic papules greasy stuck on appearance normal papules with  positive pinch sign nothing else remarkable noted on exam      Shave Biopsy Procedure Note    Pre-operative Diagnosis:  Lentigo rule out atypia    Plan:  1  Instructed to keep the wound dry and covered for 24 and clean thereafter  2  Warning signs of infection were reviewed  3  Recommended that the patient use OTC acetaminophen as needed for pain  4  Return  Pending results of biopsy(ies)    Locations:  Right lower leg    Indications:  Changing lesion    Anesthesia: Lidocaine 1% with epinephrine without added sodium bicarbonate    Procedure Details     Patient informed of the risks (including bleeding and infection) and benefits of the   procedure and Verbal informed consent obtained  The lesion and surrounding area were given a sterile prep using alcohol and draped in the usual sterile fashion  A Blue blade razor was used to obtain a specimen  Hemostasis achieved with aluminum chloride  Petrolatum and a sterile dressing applied  The specimen was sent for pathologic examination  The patient tolerated the procedure(s) well  Complications:  none  Assessment:     1  Unknown skin lesion     2  Seborrheic keratosis     3  Dermatofibroma     4   Screening for skin condition           Plan:   Skin lesion probably in pigmented lentigo nothing really markedly atypical await results of biopsy see further treatment needed  Dermatofibroma advised that these are benign lesions probably related to some type  Of injury or bug bite no treatment needed  awse  Screening for Dermatologic Disorders: Nothing else of concern noted on complete exam follow up torrey Mccormack MD  3/22/2019,12:10 PM    Portions of the record may have been created with voice recognition software   Occasional wrong word or "sound a like" substitutions may have occurred due to the inherent limitations of voice recognition software   Read the chart carefully and recognize, using context, where substitutions have occurred

## 2019-03-26 ENCOUNTER — TELEPHONE (OUTPATIENT)
Dept: DERMATOLOGY | Facility: CLINIC | Age: 64
End: 2019-03-26

## 2019-03-26 NOTE — TELEPHONE ENCOUNTER
----- Message from Andrés Marie MD sent at 3/25/2019  4:52 PM EDT -----  Patient had called  normal pathology

## 2019-04-01 DIAGNOSIS — I10 ESSENTIAL HYPERTENSION: ICD-10-CM

## 2019-04-01 RX ORDER — NADOLOL 20 MG/1
30 TABLET ORAL
Qty: 135 TABLET | Refills: 2 | Status: SHIPPED | OUTPATIENT
Start: 2019-04-01 | End: 2019-12-11 | Stop reason: SDUPTHER

## 2019-07-10 ENCOUNTER — OFFICE VISIT (OUTPATIENT)
Dept: FAMILY MEDICINE CLINIC | Facility: CLINIC | Age: 64
End: 2019-07-10
Payer: COMMERCIAL

## 2019-07-10 VITALS
HEART RATE: 67 BPM | TEMPERATURE: 98.3 F | OXYGEN SATURATION: 94 % | BODY MASS INDEX: 31.64 KG/M2 | SYSTOLIC BLOOD PRESSURE: 140 MMHG | DIASTOLIC BLOOD PRESSURE: 84 MMHG | RESPIRATION RATE: 16 BRPM | WEIGHT: 202 LBS

## 2019-07-10 DIAGNOSIS — R09.82 PND (POST-NASAL DRIP): Primary | ICD-10-CM

## 2019-07-10 DIAGNOSIS — E04.1 LEFT THYROID NODULE: ICD-10-CM

## 2019-07-10 DIAGNOSIS — J01.90 ACUTE BACTERIAL SINUSITIS: ICD-10-CM

## 2019-07-10 DIAGNOSIS — E55.9 VITAMIN D DEFICIENCY: ICD-10-CM

## 2019-07-10 DIAGNOSIS — R73.01 IFG (IMPAIRED FASTING GLUCOSE): ICD-10-CM

## 2019-07-10 DIAGNOSIS — H04.129 DRY EYE: ICD-10-CM

## 2019-07-10 DIAGNOSIS — I10 ESSENTIAL HYPERTENSION: ICD-10-CM

## 2019-07-10 DIAGNOSIS — B96.89 ACUTE BACTERIAL SINUSITIS: ICD-10-CM

## 2019-07-10 DIAGNOSIS — E78.2 MIXED HYPERLIPIDEMIA: ICD-10-CM

## 2019-07-10 PROCEDURE — 1036F TOBACCO NON-USER: CPT | Performed by: FAMILY MEDICINE

## 2019-07-10 PROCEDURE — 99214 OFFICE O/P EST MOD 30 MIN: CPT | Performed by: FAMILY MEDICINE

## 2019-07-10 RX ORDER — FEXOFENADINE HCL 180 MG/1
180 TABLET ORAL DAILY
Qty: 30 TABLET | Refills: 1 | Status: SHIPPED | OUTPATIENT
Start: 2019-07-10 | End: 2021-06-09 | Stop reason: ALTCHOICE

## 2019-07-10 RX ORDER — MOMETASONE FUROATE 50 UG/1
2 SPRAY, METERED NASAL DAILY
Qty: 17 G | Refills: 1 | Status: SHIPPED | OUTPATIENT
Start: 2019-07-10 | End: 2019-08-25 | Stop reason: SDUPTHER

## 2019-07-10 RX ORDER — CYCLOSPORINE 0.5 MG/ML
1 EMULSION OPHTHALMIC EVERY 12 HOURS
Qty: 60 ML | Refills: 3 | Status: SHIPPED | OUTPATIENT
Start: 2019-07-10 | End: 2020-01-04 | Stop reason: SDUPTHER

## 2019-07-10 NOTE — PROGRESS NOTES
FAMILY MEDICINE PROGRESS NOTE  Veronica Argueta 66 y o  female   DATE: July 10, 2019     ASSESSMENT and PLAN:  Veronica Argueta is a 66 y o  female with:     1  PND (post-nasal drip)  Symptoms consistent with PND and uncontrolled allergies, no evidence of systemic infection of ABS  Will change allergy meds to Allegra, Nasonex and nasal saline  But given duration of sytmptoms, if doesn't improve, can treat empirically for ABS  - fexofenadine (ALLEGRA) 180 MG tablet; Take 1 tablet (180 mg total) by mouth daily for 30 days  Dispense: 30 tablet; Refill: 1  - sodium chloride (OCEAN) 0 65 % nasal spray; 1 spray into each nostril as needed for rhinitis for up to 30 days  Dispense: 10 mL; Refill: 0  - mometasone (NASONEX) 50 mcg/act nasal spray; 2 sprays into each nostril daily  Dispense: 17 g; Refill: 1  -Mucinex DM 1200mg BID x 1 week  -NSAIDs or Tylenol PRN  -Reviewed supportive measures including intranasal saline, honey, salt water gargles, hot tea  Reinforced good hand hygiene   -Patient agreeable with the plan and expressed understanding  I discucssed signs and symptoms for which to RTC, go to ER or seek urgent medical care  -RTC PRN      2  Acute bacterial sinusitis    3  Vitamin D deficiency  - Vitamin D 25 hydroxy; Future    4  IFG (impaired fasting glucose)  - Hemoglobin A1C; Future    5  Mixed hyperlipidemia  - Lipid Panel with Direct LDL reflex; Future    6  Left thyroid nodule  - TSH, 3rd generation with Free T4 reflex    7  Essential hypertension  - Basic metabolic panel; Future    8  Dry eye  - cycloSPORINE (RESTASIS) 0 05 % ophthalmic emulsion; Administer 1 drop to both eyes every 12 (twelve) hours  Dispense: 60 mL; Refill: 3    Pt due for labs, ordered for prior to our follow up visit  Patient agreeable with the plan and expressed understanding  I discucssed signs and symptoms for which to RTC, go to ER or seek urgent medical care       SUBJECTIVE:  Veronica Argueta is a 66 y o  female who presents today with a chief complaint of Sinus Problem; Cough; Headache; and Ear Fullness (right)  Sinus Problem   This is a new problem  The current episode started 1 to 4 weeks ago (2 5 weeks ago with cold like symptoms)  The problem is unchanged  There has been no fever  The pain is mild  Associated symptoms include congestion, headaches and sinus pressure  Pertinent negatives include no chills, coughing, ear pain, shortness of breath, sneezing or sore throat  Past treatments include oral decongestants (mucinex dm)  The treatment provided mild relief  Review of Systems   Constitutional: Negative for chills and fever  HENT: Positive for congestion and sinus pressure  Negative for ear pain, rhinorrhea, sinus pain, sneezing and sore throat  Respiratory: Negative for cough and shortness of breath  Gastrointestinal: Negative for diarrhea, nausea and vomiting  Allergic/Immunologic: Positive for environmental allergies  Neurological: Positive for headaches  I have reviewed the patient's Past Medical History  OBJECTIVE:  /84   Pulse 67   Temp 98 3 °F (36 8 °C)   Resp 16   Wt 91 6 kg (202 lb)   SpO2 94%   BMI 31 64 kg/m²    Physical Exam   Constitutional: She appears well-developed and well-nourished  No distress  HENT:   Head: Normocephalic and atraumatic  Right Ear: External ear normal    Left Ear: External ear normal    Nose: Right sinus exhibits no maxillary sinus tenderness and no frontal sinus tenderness  Left sinus exhibits no maxillary sinus tenderness and no frontal sinus tenderness  Mouth/Throat: Uvula is midline, oropharynx is clear and moist and mucous membranes are normal  No oropharyngeal exudate, posterior oropharyngeal edema, posterior oropharyngeal erythema or tonsillar abscesses  Eyes: Conjunctivae are normal  Right eye exhibits no discharge  Left eye exhibits no discharge  Neck: Normal range of motion  Neck supple  Cardiovascular: Normal rate and normal heart sounds  Pulmonary/Chest: Effort normal and breath sounds normal  No respiratory distress  She has no wheezes  She has no rales  Lymphadenopathy:     She has no cervical adenopathy  Skin: She is not diaphoretic  Vitals reviewed  Sri Ruiz MD    Note: Portions of the record have been created with voice recognition software  Occasional wrong word or "sound a like" substitutions may have occurred due to the inherent limitations of voice recognition software  Read the chart carefully and recognize, using context, where substitutions have occurred

## 2019-08-01 DIAGNOSIS — R09.82 PND (POST-NASAL DRIP): ICD-10-CM

## 2019-08-01 RX ORDER — MOMETASONE FUROATE 50 UG/1
SPRAY, METERED NASAL
Qty: 17 G | Refills: 1 | Status: SHIPPED | OUTPATIENT
Start: 2019-08-01 | End: 2020-12-04 | Stop reason: ALTCHOICE

## 2019-08-24 DIAGNOSIS — R09.82 PND (POST-NASAL DRIP): ICD-10-CM

## 2019-08-25 RX ORDER — MOMETASONE FUROATE 50 UG/1
SPRAY, METERED NASAL
Qty: 1 ACT | Refills: 1 | Status: SHIPPED | OUTPATIENT
Start: 2019-08-25 | End: 2019-10-10 | Stop reason: SDUPTHER

## 2019-10-02 LAB
25(OH)D3 SERPL-MCNC: 41 NG/ML (ref 30–100)
BUN SERPL-MCNC: 18 MG/DL (ref 7–25)
BUN/CREAT SERPL: ABNORMAL (CALC) (ref 6–22)
CALCIUM SERPL-MCNC: 9.8 MG/DL (ref 8.6–10.4)
CHLORIDE SERPL-SCNC: 105 MMOL/L (ref 98–110)
CHOLEST SERPL-MCNC: 228 MG/DL
CHOLEST/HDLC SERPL: 5.2 (CALC)
CO2 SERPL-SCNC: 28 MMOL/L (ref 20–32)
CREAT SERPL-MCNC: 0.83 MG/DL (ref 0.5–0.99)
GLUCOSE SERPL-MCNC: 116 MG/DL (ref 65–99)
HBA1C MFR BLD: 5.8 % OF TOTAL HGB
HDLC SERPL-MCNC: 44 MG/DL
LDLC SERPL CALC-MCNC: 151 MG/DL (CALC)
NONHDLC SERPL-MCNC: 184 MG/DL (CALC)
POTASSIUM SERPL-SCNC: 4.3 MMOL/L (ref 3.5–5.3)
SL AMB EGFR AFRICAN AMERICAN: 88 ML/MIN/1.73M2
SL AMB EGFR NON AFRICAN AMERICAN: 76 ML/MIN/1.73M2
SODIUM SERPL-SCNC: 140 MMOL/L (ref 135–146)
TRIGL SERPL-MCNC: 191 MG/DL
TSH SERPL-ACNC: 1.22 MIU/L (ref 0.4–4.5)

## 2019-10-10 ENCOUNTER — OFFICE VISIT (OUTPATIENT)
Dept: FAMILY MEDICINE CLINIC | Facility: CLINIC | Age: 64
End: 2019-10-10
Payer: COMMERCIAL

## 2019-10-10 VITALS
DIASTOLIC BLOOD PRESSURE: 80 MMHG | OXYGEN SATURATION: 99 % | SYSTOLIC BLOOD PRESSURE: 128 MMHG | TEMPERATURE: 98.3 F | RESPIRATION RATE: 16 BRPM | BODY MASS INDEX: 31.72 KG/M2 | WEIGHT: 202.5 LBS | HEART RATE: 74 BPM

## 2019-10-10 DIAGNOSIS — R73.01 IFG (IMPAIRED FASTING GLUCOSE): ICD-10-CM

## 2019-10-10 DIAGNOSIS — M81.0 AGE-RELATED OSTEOPOROSIS WITHOUT CURRENT PATHOLOGICAL FRACTURE: ICD-10-CM

## 2019-10-10 DIAGNOSIS — E78.2 MIXED HYPERLIPIDEMIA: ICD-10-CM

## 2019-10-10 DIAGNOSIS — G25.0 ESSENTIAL TREMOR: ICD-10-CM

## 2019-10-10 DIAGNOSIS — Z23 NEED FOR VACCINATION: Primary | ICD-10-CM

## 2019-10-10 DIAGNOSIS — E04.1 LEFT THYROID NODULE: ICD-10-CM

## 2019-10-10 DIAGNOSIS — E66.09 CLASS 1 OBESITY DUE TO EXCESS CALORIES WITH SERIOUS COMORBIDITY AND BODY MASS INDEX (BMI) OF 31.0 TO 31.9 IN ADULT: ICD-10-CM

## 2019-10-10 DIAGNOSIS — E55.9 VITAMIN D DEFICIENCY: ICD-10-CM

## 2019-10-10 DIAGNOSIS — I10 ESSENTIAL HYPERTENSION: ICD-10-CM

## 2019-10-10 PROCEDURE — 3079F DIAST BP 80-89 MM HG: CPT | Performed by: FAMILY MEDICINE

## 2019-10-10 PROCEDURE — 90471 IMMUNIZATION ADMIN: CPT

## 2019-10-10 PROCEDURE — 3074F SYST BP LT 130 MM HG: CPT | Performed by: FAMILY MEDICINE

## 2019-10-10 PROCEDURE — 90682 RIV4 VACC RECOMBINANT DNA IM: CPT

## 2019-10-10 PROCEDURE — 99214 OFFICE O/P EST MOD 30 MIN: CPT | Performed by: FAMILY MEDICINE

## 2019-10-10 RX ORDER — NADOLOL 20 MG/1
TABLET ORAL
Refills: 2 | COMMUNITY
Start: 2019-09-25 | End: 2019-12-11

## 2019-10-10 RX ORDER — ATORVASTATIN CALCIUM 20 MG/1
20 TABLET, FILM COATED ORAL DAILY
Qty: 30 TABLET | Refills: 5 | Status: SHIPPED | OUTPATIENT
Start: 2019-10-10 | End: 2020-04-01

## 2019-10-10 NOTE — PATIENT INSTRUCTIONS
Basic Carbohydrate Counting   AMBULATORY CARE:   Carbohydrate counting  is a way to plan your meals by counting the amount of carbohydrate in foods  Carbohydrates are the sugars, starches, and fiber found in fruit, grains, vegetables, and milk products  Carbohydrates increase your blood sugar levels  Carbohydrate counting can help you eat the right amount of carbohydrate to keep your blood sugar levels under control  What you need to know about planning meals using carbohydrate counting:  · A dietitian or healthcare provider will help you develop a healthy meal plan that works best for you  You will be taught how much carbohydrate to eat or drink for each meal and snack  Your meal plan will be based on your age, weight, usual food intake, and physical activity level  If you have diabetes, it will also include your blood sugar levels and diabetes medicine  Once you know how much carbohydrate you should eat, you can decide what type of food you want to eat  · You will need to know what foods contain carbohydrate and how much they contain  Keep track of the amount of carbohydrate in meals and snacks in order to follow your meal plan  Do not avoid carbohydrates or skip meals  Your blood sugar may fall too low if you do not eat enough carbohydrate or you skip meals  Foods that contain carbohydrate:   · Breads:  Each serving of food listed below contains about 15 g of carbohydrate   ¨ 1 slice of bread (1 ounce) or 1 flour or corn tortilla (6 inch)    ¨ ½ of a hamburger bun or ¼ of a large bagel (about 1 ounce)    ¨ 1 pancake (about 4 inches across and ¼ inch thick)    · Cereals and grains:  Serving sizes of ready-to-eat cereals vary  Look at the serving size and the total carbohydrate amount listed on the food label  Each serving of food listed below contains about 15 g of carbohydrate       ¨ ¾ cup of dry, unsweetened, ready-to-eat cereal or ¼ cup of low-fat granola     ¨ ½ cup of oatmeal or other cooked cereal ¨ ? cup of cooked rice or pasta    · Starchy vegetables and beans:  Each serving of food listed below contains about 15 g of carbohydrate   ¨ ½ cup of corn, green peas, sweet potatoes, or mashed potatoes    ¨ ¼ of a large baked potato    ¨ ½ cup of beans, lentils, and peas (garbanzo, taylor, kidney, white, split, black-eyed)    · Crackers and snacks:  Each serving of food listed below contains about 15 g of carbohydrate   ¨ 3 rosanna cracker squares or 8 animal crackers     ¨ 6 saltine-type crackers    ¨ 3 cups of popcorn or ¾ ounce of pretzels, potato chips, or tortilla chips    · Fruit:  Each serving of food listed below contains about 15 g of carbohydrate   ¨ 1 small (4 ounce) piece of fresh fruit or ¾ to 1 cup of fresh fruit    ¨ ½ cup of canned or frozen fruit, packed in natural juice    ¨ ½ cup (4 ounces) of unsweetened fruit juice    ¨ 2 tablespoons of dried fruit    · Desserts or sugary foods:  Each serving of food listed below contains about 15 g of carbohydrate   ¨ 2-inch square unfrosted cake or brownie     ¨ 2 small cookies    ¨ ½ cup of ice cream, frozen yogurt, or nondairy frozen yogurt    ¨ ¼ cup of sherbet or sorbet    ¨ 1 tablespoon of regular syrup, jam, or jelly    ¨ 2 tablespoons of light syrup    · Milk and yogurt:  Foods from the milk group contain about 12 g of carbohydrate per serving  ¨ 1 cup of fat-free or low-fat milk    ¨ 1 cup of soy milk    ¨ ? cup of fat-free, yogurt sweetened with artificial sweetener    · Non-starchy vegetables:  Each serving contains about 5 g of carbohydrate   Three servings of non-starch vegetables count as 1 carbohydrate serving  ¨ ½ cup of cooked vegetables or 1 cup of raw vegetables  This includes beets, broccoli, cabbage, cauliflower, cucumber, mushrooms, tomatoes, and zucchini    ¨ ½ cup of vegetable juice  How to use carbohydrate counting to plan meals:   · Count carbohydrate amounts using serving sizes:      ¨ Pasta dinner example:   You plan to have pasta, tossed salad, and an 8-ounce glass of milk  Your healthcare provider tells you that you may have 4 carbohydrate servings for dinner  One carbohydrate serving of pasta is ? cup  One cup of pasta will equal 3 carbohydrate servings  An 8-ounce glass of milk will count as 1 carbohydrate serving  These amounts of food would equal 4 carbohydrate servings  One cup of tossed salad does not count toward your carbohydrate servings  · Count carbohydrate amounts using food labels:  Find the total amount of carbohydrate in a packaged food by reading the food label  Food labels tell you the serving size of the food and the total carbohydrate amount in each serving  Find the serving size on the food label and then decide how many servings you will eat  Multiply the number of servings you plan to eat by the carbohydrate amount per serving  ¨ Granola bar snack example: Your meal plan allows you to have 2 carbohydrate servings (30 grams) of carbohydrate for a snack  You plan to eat 1 package of granola bars, which contains 2 bars  According to the food label, the serving size of food in this package is 1 bar  Each serving (1 bar) contains 25 grams of carbohydrate  The total amount of carbohydrate in this package of granola bars would be 50 g  Based on your meal plan, you should eat only 1 bar  Follow up with your healthcare provider as directed:  Write down your questions so you remember to ask them during your visits  © 2017 [employer] Information is for End User's use only and may not be sold, redistributed or otherwise used for commercial purposes  All illustrations and images included in CareNotes® are the copyrighted property of [employer] , [employer]  or Thomas Covarrubias  The above information is an  only  It is not intended as medical advice for individual conditions or treatments   Talk to your doctor, nurse or pharmacist before following any medical regimen to see if it is safe and effective for you

## 2019-10-10 NOTE — ASSESSMENT & PLAN NOTE
Lab Results   Component Value Date    HGBA1C 5 8 (H) 10/01/2019     Recommended low carb diet, provided with handout and counseled on weight loss

## 2019-10-10 NOTE — PROGRESS NOTES
FAMILY MEDICINE PROGRESS NOTE  Jani Jon 66 y o  female   DATE: October 10, 2019     ASSESSMENT and PLAN:  Jani Jon is a 66 y o  female with:     Vitamin D deficiency  Vit D level 41  Continue Vit D 4000 IU    Essential hypertension  BP Readings from Last 3 Encounters:   10/10/19 128/80   07/10/19 140/84   01/17/19 122/74     Lab Results   Component Value Date    CREATININE 0 83 10/01/2019    EGFR >60 0 07/10/2017     Controlled on current regimen: Nadolol 30mg daily (started initially for tremors)    Left thyroid nodule  July 2018: Unchanged tiny predominantly cystic subcentimeter lesions in the left thyroid    No dominant nodules meeting criteria for biopsy based on recent published guidelines for American thyroid Association are identified   Lab Results   Component Value Date    [id number] 1 460 12/20/2016    TSH 1 50 06/01/2018     No meds indicated    Mixed hyperlipidemia  Lab Results   Component Value Date    CHOLESTEROL 228 (H) 10/01/2019    HDL 44 (L) 10/01/2019    LDLC 151 (H) 10/01/2019    LDLCALC 126 (H) 07/10/2017    TRIG 191 (H) 10/01/2019     The 10-year ASCVD risk score (Herman Patten et al , 2013) is: 6 2%    Values used to calculate the score:      Age: 66 years      Sex: Female      Is Non- : No      Diabetic: No      Tobacco smoker: No      Systolic Blood Pressure: 313 mmHg      Is BP treated: Yes      HDL Cholesterol: 44 mg/dL      Total Cholesterol: 228 mg/dL    Reviewed healthy, low cholesterol diet  Given increasing levels, start Atorvastatin 20mg    IFG (impaired fasting glucose)  Lab Results   Component Value Date    HGBA1C 5 8 (H) 10/01/2019     Recommended low carb diet, provided with handout and counseled on weight loss    Class 1 obesity due to excess calories with serious comorbidity and body mass index (BMI) of 31 0 to 31 9 in adult  Wt Readings from Last 3 Encounters:   10/10/19 91 9 kg (202 lb 8 oz)   07/10/19 91 6 kg (202 lb)   01/17/19 89 4 kg (197 lb)     BMI Counseling: Body mass index is 31 72 kg/m²  Discussed the patient's BMI with her  The BMI is above normal  Nutrition recommendations include reducing portion sizes, consuming healthier snacks, moderation in carbohydrate intake and reducing intake of cholesterol  Exercise recommendations include moderate aerobic physical activity for 150 minutes/week  Age-related osteoporosis without current pathological fracture  Osteoporosis diagnosed in June 2016 with T score of -2 6, started on Ibandronate (Boniva 450mg monthly) in Jan 2017 and repeat DEXA in 2018 showed improvement to osteopenia    RTC 6 months for annual HM with repeat labs at that time    SUBJECTIVE:  Ora Castellano is a 66 y o  female who presents today with a chief complaint of Follow-up  Ora Castellano is here for a 6 month follow-up, she did have labs done prior to this visit  The active chronic medical problems and medications are as below:   1  Tremor- has noticed significant improvement with the higher dose of Nadolol, no episodes of bradycardia with higher dose  She monitors her HR on her smartwatch  2  HLD- worsened, she hasn't been following a close diet, has not been able to lose weight  3  Thyroid nodule-unchanged, asymptomatic, normal TSH    Also would like her flu shot today    Review of Systems   Eyes: Negative for visual disturbance  Respiratory: Negative for shortness of breath  Cardiovascular: Negative for chest pain and palpitations  Neurological: Negative for dizziness and light-headedness  I have reviewed the patient's Past Medical History  OBJECTIVE:  /80   Pulse 74   Temp 98 3 °F (36 8 °C)   Resp 16   Wt 91 9 kg (202 lb 8 oz)   SpO2 99%   BMI 31 72 kg/m²    Physical Exam   Constitutional: She appears well-developed and well-nourished  No distress  obese   Cardiovascular: Normal rate, regular rhythm and normal heart sounds     Pulmonary/Chest: Effort normal and breath sounds normal  No respiratory distress  She has no wheezes  She has no rales  Skin: She is not diaphoretic  Vitals reviewed  Orders Only on 10/01/2019   Component Date Value Ref Range Status    Total Cholesterol 10/01/2019 228* <200 mg/dL Final    HDL 10/01/2019 44* >50 mg/dL Final    Triglycerides 10/01/2019 191* <150 mg/dL Final    LDL Direct 10/01/2019 151* mg/dL (calc) Final    Comment: Reference range: <100     Desirable range <100 mg/dL for primary prevention;    <70 mg/dL for patients with CHD or diabetic patients   with > or = 2 CHD risk factors  LDL-C is now calculated using the Roque-Farnsworth   calculation, which is a validated novel method providing   better accuracy than the Friedewald equation in the   estimation of LDL-C  Tiffanie Moncada et al  Health system  9393;540(24): 9835-4522   (http://[employer]/faq/ZLR185)      Chol HDLC Ratio 10/01/2019 5 2* <5 0 (calc) Final    Non-HDL Cholesterol 10/01/2019 184* <130 mg/dL (calc) Final    Comment: For patients with diabetes plus 1 major ASCVD risk   factor, treating to a non-HDL-C goal of <100 mg/dL   (LDL-C of <70 mg/dL) is considered a therapeutic   option   Glucose, Random 10/01/2019 116* 65 - 99 mg/dL Final    Comment:               Fasting reference interval     For someone without known diabetes, a glucose value  between 100 and 125 mg/dL is consistent with  prediabetes and should be confirmed with a  follow-up test          BUN 10/01/2019 18  7 - 25 mg/dL Final    Creatinine 10/01/2019 0 83  0 50 - 0 99 mg/dL Final    Comment: For patients >52years of age, the reference limit  for Creatinine is approximately 13% higher for people  identified as -American           eGFR Non African American 10/01/2019 76  > OR = 60 mL/min/1 73m2 Final    eGFR  10/01/2019 88  > OR = 60 mL/min/1 73m2 Final    SL AMB BUN/CREATININE RATIO 96/71/1491 NOT APPLICABLE  6 - 22 (calc) Final    Sodium 10/01/2019 140  135 - 146 mmol/L Final    Potassium 10/01/2019 4 3  3 5 - 5 3 mmol/L Final    Chloride 10/01/2019 105  98 - 110 mmol/L Final    CO2 10/01/2019 28  20 - 32 mmol/L Final    SL AMB CALCIUM 10/01/2019 9 8  8 6 - 10 4 mg/dL Final    Vitamin D, 25-Hydroxy, Serum 10/01/2019 41  30 - 100 ng/mL Final    Comment: Vitamin D Status         25-OH Vitamin D:     Deficiency:                    <20 ng/mL  Insufficiency:             20 - 29 ng/mL  Optimal:                 > or = 30 ng/mL     For 25-OH Vitamin D testing on patients on   D2-supplementation and patients for whom quantitation   of D2 and D3 fractions is required, the QuestAssureD(TM)  25-OH VIT D, (D2,D3), LC/MS/MS is recommended: order   code 60063 (patients >2yrs)  For more information on this test, go to:  http://[employer]/faq/TEC089  (This link is being provided for   informational/educational purposes only )      TSH W/RFX TO FREE T4 10/01/2019 1 22  0 40 - 4 50 mIU/L Final    Hemoglobin A1C 10/01/2019 5 8* <5 7 % of total Hgb Final    Comment: For someone without known diabetes, a hemoglobin   A1c value between 5 7% and 6 4% is consistent with  prediabetes and should be confirmed with a   follow-up test      For someone with known diabetes, a value <7%  indicates that their diabetes is well controlled  A1c  targets should be individualized based on duration of  diabetes, age, comorbid conditions, and other  considerations  This assay result is consistent with an increased risk  of diabetes  Currently, no consensus exists regarding use of  hemoglobin A1c for diagnosis of diabetes for children  Blanca Carrillo MD    Note: Portions of the record have been created with voice recognition software  Occasional wrong word or "sound a like" substitutions may have occurred due to the inherent limitations of voice recognition software  Read the chart carefully and recognize, using context, where substitutions have occurred

## 2019-10-10 NOTE — ASSESSMENT & PLAN NOTE
BP Readings from Last 3 Encounters:   10/10/19 128/80   07/10/19 140/84   01/17/19 122/74     Lab Results   Component Value Date    CREATININE 0 83 10/01/2019    EGFR >60 0 07/10/2017     Controlled on current regimen: Nadolol 30mg daily (started initially for tremors)

## 2019-10-10 NOTE — ASSESSMENT & PLAN NOTE
Wt Readings from Last 3 Encounters:   10/10/19 91 9 kg (202 lb 8 oz)   07/10/19 91 6 kg (202 lb)   01/17/19 89 4 kg (197 lb)     BMI Counseling: Body mass index is 31 72 kg/m²  Discussed the patient's BMI with her  The BMI is above normal  Nutrition recommendations include reducing portion sizes, consuming healthier snacks, moderation in carbohydrate intake and reducing intake of cholesterol  Exercise recommendations include moderate aerobic physical activity for 150 minutes/week

## 2019-10-10 NOTE — ASSESSMENT & PLAN NOTE
July 2018: Unchanged tiny predominantly cystic subcentimeter lesions in the left thyroid    No dominant nodules meeting criteria for biopsy based on recent published guidelines for American thyroid Association are identified   Lab Results   Component Value Date    [id number] 1 460 12/20/2016    TSH 1 50 06/01/2018     No meds indicated

## 2019-10-10 NOTE — ASSESSMENT & PLAN NOTE
Lab Results   Component Value Date    CHOLESTEROL 228 (H) 10/01/2019    HDL 44 (L) 10/01/2019    LDLC 151 (H) 10/01/2019    LDLCALC 126 (H) 07/10/2017    TRIG 191 (H) 10/01/2019     The 10-year ASCVD risk score (Elina Calhoun et al , 2013) is: 6 2%    Values used to calculate the score:      Age: 66 years      Sex: Female      Is Non- : No      Diabetic: No      Tobacco smoker: No      Systolic Blood Pressure: 418 mmHg      Is BP treated: Yes      HDL Cholesterol: 44 mg/dL      Total Cholesterol: 228 mg/dL    Reviewed healthy, low cholesterol diet  Given increasing levels, start Atorvastatin 20mg

## 2019-10-11 NOTE — PROGRESS NOTES
Chart updated per office request  Discrete reportable data documented      *Updated Mammograms DOS  7-18-19 7-12-18 6-28-17 6-22-16 6-9-15  6-3-14  5-28-13 5-21-12

## 2019-11-10 DIAGNOSIS — M81.0 AGE-RELATED OSTEOPOROSIS WITHOUT CURRENT PATHOLOGICAL FRACTURE: ICD-10-CM

## 2019-11-10 RX ORDER — IBANDRONATE SODIUM 150 MG/1
TABLET, FILM COATED ORAL
Qty: 3 TABLET | Refills: 3 | Status: SHIPPED | OUTPATIENT
Start: 2019-11-10 | End: 2020-11-04

## 2019-12-11 DIAGNOSIS — I10 ESSENTIAL HYPERTENSION: ICD-10-CM

## 2019-12-11 RX ORDER — NADOLOL 20 MG/1
30 TABLET ORAL
Qty: 135 TABLET | Refills: 2 | Status: SHIPPED | OUTPATIENT
Start: 2019-12-11 | End: 2020-09-08

## 2020-01-04 DIAGNOSIS — H04.129 DRY EYE: ICD-10-CM

## 2020-01-04 RX ORDER — CYCLOSPORINE 0.5 MG/ML
EMULSION OPHTHALMIC
Qty: 180 ML | Refills: 0 | Status: SHIPPED | OUTPATIENT
Start: 2020-01-04 | End: 2022-04-11 | Stop reason: SDUPTHER

## 2020-03-14 LAB
BUN SERPL-MCNC: 16 MG/DL (ref 7–25)
BUN/CREAT SERPL: ABNORMAL (CALC) (ref 6–22)
CALCIUM SERPL-MCNC: 10.5 MG/DL (ref 8.6–10.4)
CHLORIDE SERPL-SCNC: 104 MMOL/L (ref 98–110)
CHOLEST SERPL-MCNC: 140 MG/DL
CHOLEST/HDLC SERPL: 4.2 (CALC)
CO2 SERPL-SCNC: 28 MMOL/L (ref 20–32)
CREAT SERPL-MCNC: 0.91 MG/DL (ref 0.5–0.99)
GLUCOSE SERPL-MCNC: 117 MG/DL (ref 65–99)
HBA1C MFR BLD: 6 % OF TOTAL HGB
HDLC SERPL-MCNC: 33 MG/DL
LDLC SERPL CALC-MCNC: 84 MG/DL (CALC)
NONHDLC SERPL-MCNC: 107 MG/DL (CALC)
POTASSIUM SERPL-SCNC: 4.5 MMOL/L (ref 3.5–5.3)
SL AMB EGFR AFRICAN AMERICAN: 78 ML/MIN/1.73M2
SL AMB EGFR NON AFRICAN AMERICAN: 68 ML/MIN/1.73M2
SODIUM SERPL-SCNC: 142 MMOL/L (ref 135–146)
TRIGL SERPL-MCNC: 131 MG/DL

## 2020-04-01 DIAGNOSIS — E78.2 MIXED HYPERLIPIDEMIA: ICD-10-CM

## 2020-04-01 RX ORDER — ATORVASTATIN CALCIUM 20 MG/1
TABLET, FILM COATED ORAL
Qty: 90 TABLET | Refills: 1 | Status: SHIPPED | OUTPATIENT
Start: 2020-04-01 | End: 2020-09-28

## 2020-04-09 ENCOUNTER — TELEMEDICINE (OUTPATIENT)
Dept: FAMILY MEDICINE CLINIC | Facility: CLINIC | Age: 65
End: 2020-04-09
Payer: COMMERCIAL

## 2020-04-09 VITALS
BODY MASS INDEX: 30.29 KG/M2 | HEIGHT: 67 IN | DIASTOLIC BLOOD PRESSURE: 85 MMHG | SYSTOLIC BLOOD PRESSURE: 138 MMHG | WEIGHT: 193 LBS | HEART RATE: 62 BPM

## 2020-04-09 DIAGNOSIS — G25.0 ESSENTIAL TREMOR: ICD-10-CM

## 2020-04-09 DIAGNOSIS — M81.0 AGE-RELATED OSTEOPOROSIS WITHOUT CURRENT PATHOLOGICAL FRACTURE: ICD-10-CM

## 2020-04-09 DIAGNOSIS — E66.09 CLASS 1 OBESITY DUE TO EXCESS CALORIES WITH SERIOUS COMORBIDITY AND BODY MASS INDEX (BMI) OF 30.0 TO 30.9 IN ADULT: ICD-10-CM

## 2020-04-09 DIAGNOSIS — R73.01 IFG (IMPAIRED FASTING GLUCOSE): ICD-10-CM

## 2020-04-09 DIAGNOSIS — E78.2 MIXED HYPERLIPIDEMIA: ICD-10-CM

## 2020-04-09 DIAGNOSIS — I10 ESSENTIAL HYPERTENSION: Primary | ICD-10-CM

## 2020-04-09 PROCEDURE — 3079F DIAST BP 80-89 MM HG: CPT | Performed by: FAMILY MEDICINE

## 2020-04-09 PROCEDURE — 3075F SYST BP GE 130 - 139MM HG: CPT | Performed by: FAMILY MEDICINE

## 2020-04-09 PROCEDURE — 3008F BODY MASS INDEX DOCD: CPT | Performed by: FAMILY MEDICINE

## 2020-04-09 PROCEDURE — 99214 OFFICE O/P EST MOD 30 MIN: CPT | Performed by: FAMILY MEDICINE

## 2020-07-24 ENCOUNTER — HOSPITAL ENCOUNTER (OUTPATIENT)
Dept: RADIOLOGY | Age: 65
Discharge: HOME | End: 2020-07-24
Payer: COMMERCIAL

## 2020-07-24 DIAGNOSIS — M81.0 AGE-RELATED OSTEOPOROSIS WITHOUT CURRENT PATHOLOGICAL FRACTURE: ICD-10-CM

## 2020-07-24 PROCEDURE — 77080 DXA BONE DENSITY AXIAL: CPT

## 2020-09-08 DIAGNOSIS — I10 ESSENTIAL HYPERTENSION: ICD-10-CM

## 2020-09-08 RX ORDER — NADOLOL 20 MG/1
30 TABLET ORAL
Qty: 135 TABLET | Refills: 2 | Status: SHIPPED | OUTPATIENT
Start: 2020-09-08 | End: 2021-06-09 | Stop reason: SDUPTHER

## 2020-09-27 DIAGNOSIS — E78.2 MIXED HYPERLIPIDEMIA: ICD-10-CM

## 2020-09-28 RX ORDER — ATORVASTATIN CALCIUM 20 MG/1
TABLET, FILM COATED ORAL
Qty: 90 TABLET | Refills: 1 | Status: SHIPPED | OUTPATIENT
Start: 2020-09-28 | End: 2020-11-18 | Stop reason: SDUPTHER

## 2020-11-04 DIAGNOSIS — M81.0 AGE-RELATED OSTEOPOROSIS WITHOUT CURRENT PATHOLOGICAL FRACTURE: ICD-10-CM

## 2020-11-04 RX ORDER — IBANDRONATE SODIUM 150 MG/1
TABLET, FILM COATED ORAL
Qty: 3 TABLET | Refills: 3 | Status: SHIPPED | OUTPATIENT
Start: 2020-11-04 | End: 2021-12-08 | Stop reason: SDUPTHER

## 2020-11-18 DIAGNOSIS — I10 ESSENTIAL HYPERTENSION: Primary | ICD-10-CM

## 2020-11-18 DIAGNOSIS — E78.2 MIXED HYPERLIPIDEMIA: ICD-10-CM

## 2020-11-18 RX ORDER — ATORVASTATIN CALCIUM 20 MG/1
20 TABLET, FILM COATED ORAL DAILY
Qty: 90 TABLET | Refills: 0 | Status: SHIPPED | OUTPATIENT
Start: 2020-11-18 | End: 2021-06-09 | Stop reason: SDUPTHER

## 2020-11-25 LAB
BUN SERPL-MCNC: 16 MG/DL (ref 7–25)
BUN/CREAT SERPL: ABNORMAL (CALC) (ref 6–22)
CALCIUM SERPL-MCNC: 9.6 MG/DL (ref 8.6–10.4)
CHLORIDE SERPL-SCNC: 107 MMOL/L (ref 98–110)
CHOLEST SERPL-MCNC: 146 MG/DL
CHOLEST/HDLC SERPL: 3.9 (CALC)
CO2 SERPL-SCNC: 28 MMOL/L (ref 20–32)
CREAT SERPL-MCNC: 0.71 MG/DL (ref 0.5–0.99)
GLUCOSE SERPL-MCNC: 110 MG/DL (ref 65–99)
HDLC SERPL-MCNC: 37 MG/DL
LDLC SERPL CALC-MCNC: 86 MG/DL (CALC)
NONHDLC SERPL-MCNC: 109 MG/DL (CALC)
POTASSIUM SERPL-SCNC: 4.3 MMOL/L (ref 3.5–5.3)
SL AMB EGFR AFRICAN AMERICAN: 105 ML/MIN/1.73M2
SL AMB EGFR NON AFRICAN AMERICAN: 91 ML/MIN/1.73M2
SODIUM SERPL-SCNC: 142 MMOL/L (ref 135–146)
TRIGL SERPL-MCNC: 131 MG/DL

## 2020-12-04 ENCOUNTER — OFFICE VISIT (OUTPATIENT)
Dept: FAMILY MEDICINE CLINIC | Facility: CLINIC | Age: 65
End: 2020-12-04
Payer: COMMERCIAL

## 2020-12-04 VITALS
RESPIRATION RATE: 18 BRPM | SYSTOLIC BLOOD PRESSURE: 180 MMHG | BODY MASS INDEX: 29.9 KG/M2 | WEIGHT: 190.5 LBS | HEIGHT: 67 IN | HEART RATE: 72 BPM | DIASTOLIC BLOOD PRESSURE: 90 MMHG | TEMPERATURE: 97.9 F | OXYGEN SATURATION: 98 %

## 2020-12-04 DIAGNOSIS — G25.0 ESSENTIAL TREMOR: ICD-10-CM

## 2020-12-04 DIAGNOSIS — E55.9 VITAMIN D DEFICIENCY: ICD-10-CM

## 2020-12-04 DIAGNOSIS — E66.09 CLASS 1 OBESITY DUE TO EXCESS CALORIES WITH SERIOUS COMORBIDITY AND BODY MASS INDEX (BMI) OF 30.0 TO 30.9 IN ADULT: ICD-10-CM

## 2020-12-04 DIAGNOSIS — E78.2 MIXED HYPERLIPIDEMIA: ICD-10-CM

## 2020-12-04 DIAGNOSIS — I10 ESSENTIAL HYPERTENSION: ICD-10-CM

## 2020-12-04 DIAGNOSIS — R73.01 IFG (IMPAIRED FASTING GLUCOSE): Primary | ICD-10-CM

## 2020-12-04 DIAGNOSIS — E04.1 LEFT THYROID NODULE: ICD-10-CM

## 2020-12-04 DIAGNOSIS — Z00.00 ANNUAL PHYSICAL EXAM: ICD-10-CM

## 2020-12-04 PROCEDURE — 99396 PREV VISIT EST AGE 40-64: CPT | Performed by: FAMILY MEDICINE

## 2020-12-04 PROCEDURE — 3725F SCREEN DEPRESSION PERFORMED: CPT | Performed by: FAMILY MEDICINE

## 2020-12-04 PROCEDURE — 3080F DIAST BP >= 90 MM HG: CPT | Performed by: FAMILY MEDICINE

## 2020-12-04 PROCEDURE — 1036F TOBACCO NON-USER: CPT | Performed by: FAMILY MEDICINE

## 2020-12-04 PROCEDURE — 3077F SYST BP >= 140 MM HG: CPT | Performed by: FAMILY MEDICINE

## 2020-12-04 PROCEDURE — 3008F BODY MASS INDEX DOCD: CPT | Performed by: FAMILY MEDICINE

## 2020-12-04 RX ORDER — LISINOPRIL 5 MG/1
5 TABLET ORAL DAILY
Qty: 90 TABLET | Refills: 1 | Status: SHIPPED | OUTPATIENT
Start: 2020-12-04 | End: 2021-02-08 | Stop reason: DRUGHIGH

## 2020-12-31 ENCOUNTER — OFFICE VISIT (OUTPATIENT)
Dept: URGENT CARE | Facility: MEDICAL CENTER | Age: 65
End: 2020-12-31
Payer: COMMERCIAL

## 2020-12-31 VITALS
SYSTOLIC BLOOD PRESSURE: 144 MMHG | BODY MASS INDEX: 30.07 KG/M2 | RESPIRATION RATE: 18 BRPM | HEIGHT: 67 IN | HEART RATE: 63 BPM | WEIGHT: 191.6 LBS | DIASTOLIC BLOOD PRESSURE: 94 MMHG | TEMPERATURE: 98.6 F | OXYGEN SATURATION: 97 %

## 2020-12-31 DIAGNOSIS — S61.412A LACERATION OF LEFT HAND WITHOUT FOREIGN BODY, INITIAL ENCOUNTER: Primary | ICD-10-CM

## 2020-12-31 PROCEDURE — 90471 IMMUNIZATION ADMIN: CPT | Performed by: PHYSICIAN ASSISTANT

## 2020-12-31 PROCEDURE — 12002 RPR S/N/AX/GEN/TRNK2.6-7.5CM: CPT | Performed by: PHYSICIAN ASSISTANT

## 2020-12-31 PROCEDURE — 99213 OFFICE O/P EST LOW 20 MIN: CPT | Performed by: PHYSICIAN ASSISTANT

## 2020-12-31 PROCEDURE — 90715 TDAP VACCINE 7 YRS/> IM: CPT

## 2021-01-06 ENCOUNTER — CLINICAL SUPPORT (OUTPATIENT)
Dept: FAMILY MEDICINE CLINIC | Facility: CLINIC | Age: 66
End: 2021-01-06
Payer: COMMERCIAL

## 2021-01-06 ENCOUNTER — TELEPHONE (OUTPATIENT)
Dept: FAMILY MEDICINE CLINIC | Facility: CLINIC | Age: 66
End: 2021-01-06

## 2021-01-06 VITALS
HEART RATE: 70 BPM | WEIGHT: 192 LBS | DIASTOLIC BLOOD PRESSURE: 90 MMHG | TEMPERATURE: 97.6 F | SYSTOLIC BLOOD PRESSURE: 140 MMHG | BODY MASS INDEX: 30.13 KG/M2 | OXYGEN SATURATION: 98 % | RESPIRATION RATE: 18 BRPM | HEIGHT: 67 IN

## 2021-01-06 DIAGNOSIS — S61.412D LACERATION OF LEFT HAND WITHOUT FOREIGN BODY, SUBSEQUENT ENCOUNTER: ICD-10-CM

## 2021-01-06 PROCEDURE — 99211 OFF/OP EST MAY X REQ PHY/QHP: CPT

## 2021-01-06 PROCEDURE — 3008F BODY MASS INDEX DOCD: CPT | Performed by: FAMILY MEDICINE

## 2021-01-06 PROCEDURE — S0630 REMOVAL OF SUTURES: HCPCS

## 2021-01-06 NOTE — TELEPHONE ENCOUNTER
Patient in office today for Suture removal and Blood pressure check  Blood pressure was elevated at 140/90  She states it was elevated at the urgent care on 12/31/2020 also  Please advise  yes

## 2021-01-06 NOTE — PROGRESS NOTES
Patient here today for blood pressure check and suture removal  Blood pressure was elevated at 140/90, she states it was also elevated at the Urgent care on 12/31/2020  Removed 3 sutures from patients left palm area  Removed easily, no drainage or foul smell  Patient denies any pain in area  Advised patient to keep area dry and clean, call the office with any fever or drainage from wound site  Please advise

## 2021-01-06 NOTE — TELEPHONE ENCOUNTER
Patient informed  Will increase Lisinopril to two 5mg tablets  States she has about 1 month left if she factors in the increase  Will call back for refill when she has about 1 week of medication left  Suggested she specify the recent dosage change when she calls for her next refill

## 2021-01-06 NOTE — TELEPHONE ENCOUNTER
Please have patient increase lisinopril to 10 mg  Let me know if she needs refills/new prescription   Thank you

## 2021-01-07 ENCOUNTER — TELEPHONE (OUTPATIENT)
Dept: ADMINISTRATIVE | Facility: OTHER | Age: 66
End: 2021-01-07

## 2021-01-07 NOTE — TELEPHONE ENCOUNTER
----- Message from Feliciano Carcamo sent at 1/6/2021  5:09 PM EST -----  Regarding: Mammogram  01/06/21 5:10 PM    Hello, our patient Kaela Daley has had Mammogram  completed/performed  Please assist in updating the patient chart by [facility] The date of service is 07/2020       Thank you,  PRO Carcamo PG

## 2021-01-07 NOTE — TELEPHONE ENCOUNTER
Upon review of the In Basket request we were able to locate, review, and update the patient chart as requested for Mammogram and Pap Smear (HPV) aka Cervical Cancer Screening  Any additional questions or concerns should be emailed to the Practice Liaisons via [email or web address] email, please do not reply via In Basket      Thank you  Marci Banda

## 2021-01-28 ENCOUNTER — TELEPHONE (OUTPATIENT)
Dept: FAMILY MEDICINE CLINIC | Facility: CLINIC | Age: 66
End: 2021-01-28

## 2021-01-28 NOTE — TELEPHONE ENCOUNTER
I was not aware of any positive COVID result, it looks like it was scanned in yesterday however I never was notified of the result or received the paper    Please have patient schedule virtual visit follow-up for positive COVID test

## 2021-01-28 NOTE — TELEPHONE ENCOUNTER
Patient states she was expecting a phone call after positive COVID result but has not heard from anyone

## 2021-01-29 ENCOUNTER — TELEMEDICINE (OUTPATIENT)
Dept: FAMILY MEDICINE CLINIC | Facility: CLINIC | Age: 66
End: 2021-01-29
Payer: COMMERCIAL

## 2021-01-29 DIAGNOSIS — U07.1 UPPER RESPIRATORY TRACT INFECTION DUE TO 2019 NOVEL CORONAVIRUS: Primary | ICD-10-CM

## 2021-01-29 DIAGNOSIS — J06.9 UPPER RESPIRATORY TRACT INFECTION DUE TO 2019 NOVEL CORONAVIRUS: Primary | ICD-10-CM

## 2021-01-29 PROCEDURE — 99213 OFFICE O/P EST LOW 20 MIN: CPT | Performed by: FAMILY MEDICINE

## 2021-01-29 PROCEDURE — 1036F TOBACCO NON-USER: CPT | Performed by: FAMILY MEDICINE

## 2021-01-29 NOTE — PROGRESS NOTES
COVID-19 Virtual Visit     Assessment/Plan:    Problem List Items Addressed This Visit     None      Visit Diagnoses     Upper respiratory tract infection due to 2019 novel coronavirus    -  Primary         Disposition:     I recommended continued isolation until at least 24 hours have passed since recovery defined as resolution of fever without the use of fever-reducing medications AND improvement in COVID symptoms AND 10 days have passed since onset of symptoms (or 10 days have passed since date of first positive viral diagnostic test for asymptomatic patients)  Reviewed guidelines for quarantine, she can discontinue isolation at this in point  She is able to get her second vaccine  Reviewed return precautions  I have spent 9 minutes directly with the patient  Greater than 50% of this time was spent in counseling/coordination of care regarding: instructions for management, patient and family education and impressions  Encounter provider Reyna Erickson MD    Provider located at [address] [address] [phone]    Recent Visits  Date Type Provider Dept   01/28/21 Telephone MD Frantz Sandoval 72 recent visits within past 7 days and meeting all other requirements     Today's Visits  Date Type Provider Dept   01/29/21 Telemedicine MD Frantz Sandoval   Showing today's visits and meeting all other requirements     Future Appointments  No visits were found meeting these conditions  Showing future appointments within next 150 days and meeting all other requirements      This virtual check-in was done via [employer] ([employer]) and patient was informed that this is a secure, HIPAA-compliant platform  She agrees to proceed  Patient agrees to participate in a virtual check in via telephone or video visit instead of presenting to the office to address urgent/immediate medical needs   Patient is aware this is a billable service  After connecting through [facility], the patient was identified by name and date of birth  Fuad Villalobos was informed that this was a telemedicine visit and that the exam was being conducted confidentially over secure lines  My office door was closed  No one else was in the room  Fuad Villalobos acknowledged consent and understanding of privacy and security of the telemedicine visit  I informed the patient that I have reviewed her record in Epic and presented the opportunity for her to ask any questions regarding the visit today  The patient agreed to participate  Subjective: Fuad Villalobos is a 63 y o  female who has been screened for COVID-19  Symptom change since last report: resolving  Patient's symptoms include nasal congestion, rhinorrhea, anosmia, cough and chest tightness  Patient denies fever, chills, fatigue, malaise, sore throat, loss of taste, shortness of breath, abdominal pain, nausea, vomiting, diarrhea, myalgias and headaches  Julianne Esposito has been staying home and has isolated themselves in her home  Date of symptom onset: 1/17/2021  Date of positive COVID-19 PCR: 1/21/2021    She reports that she had her first COVID vaccine on January 11  She had some sore throat and cold symptoms prior to the vaccine  She had a fever on January 17  She was tested on Thursday January 21  She has not had a fever in a week  Her  has symptoms prior to her       No results found for: Main Gee, NEYMAR, [address]  Past Medical History:   Diagnosis Date    Hypertension     Lymphadenopathy     LAST ASSESSED [id number]     Past Surgical History:   Procedure Laterality Date    REDUCTION MAMMAPLASTY      TONSILLECTOMY       Current Outpatient Medications   Medication Sig Dispense Refill    atorvastatin (LIPITOR) 20 mg tablet Take 1 tablet (20 mg total) by mouth daily 90 tablet 0    Calcium Carbonate-Vitamin D (CALCIUM 600+D) 600-400 MG-UNIT per tablet Take 1 tablet by mouth daily      fexofenadine (ALLEGRA) 180 MG tablet Take 1 tablet (180 mg total) by mouth daily for 30 days 30 tablet 1    ibandronate (BONIVA) 150 MG tablet TAKE 1 TABLET BY MOUTH MONTHLY 3 tablet 3    lisinopril (ZESTRIL) 5 mg tablet Take 1 tablet (5 mg total) by mouth daily 90 tablet 1    Multiple Vitamins-Minerals (MULTIVITAMIN ADULT PO) Take 1 tablet by mouth daily      nadolol (CORGARD) 20 mg tablet TAKE 1 5 TABLETS (30 MG TOTAL) BY MOUTH DAILY AT BEDTIME FOR 90 DAYS 135 tablet 2    Omega-3 Fatty Acids (FISH OIL) 645 MG CAPS Take 1 capsule by mouth daily      RESTASIS 0 05 % ophthalmic emulsion INSTILL 1 DROP INTO BOTH EYES EVERY 12 HOURS 180 mL 0    sodium chloride (OCEAN) 0 65 % nasal spray 1 spray into each nostril as needed for rhinitis for up to 30 days 10 mL 0    valACYclovir (VALTREX) 1,000 mg tablet Take 1 tablet (1,000 mg total) by mouth 2 (two) times a day for 14 days 90 tablet 3     No current facility-administered medications for this visit  Allergies   Allergen Reactions    Other Allergic Rhinitis       Review of Systems   Constitutional: Negative for chills, fatigue and fever  HENT: Positive for congestion and rhinorrhea  Negative for sore throat  Respiratory: Positive for cough and chest tightness  Negative for shortness of breath  Gastrointestinal: Negative for abdominal pain, diarrhea, nausea and vomiting  Musculoskeletal: Negative for myalgias  Neurological: Negative for headaches  Objective: There were no vitals filed for this visit  Physical Exam  Constitutional:       General: She is not in acute distress  Appearance: Normal appearance  She is not toxic-appearing  HENT:      Head: Normocephalic  Nose: Nose normal  No congestion  Mouth/Throat:      Mouth: Mucous membranes are moist    Eyes:      Extraocular Movements: Extraocular movements intact        Conjunctiva/sclera: Conjunctivae normal    Neck:      Musculoskeletal: Normal range of motion  Pulmonary:      Effort: Pulmonary effort is normal  No respiratory distress  Neurological:      Mental Status: She is alert  VIRTUAL VISIT Estrella Narvaez 144 acknowledges that she has consented to an online visit or consultation  She understands that the online visit is based solely on information provided by her, and that, in the absence of a face-to-face physical evaluation by the physician, the diagnosis she receives is both limited and provisional in terms of accuracy and completeness  This is not intended to replace a full medical face-to-face evaluation by the physician  Iwona Roberts understands and accepts these terms

## 2021-02-08 DIAGNOSIS — I10 ESSENTIAL HYPERTENSION: Primary | ICD-10-CM

## 2021-02-08 RX ORDER — LISINOPRIL 10 MG/1
10 TABLET ORAL DAILY
Qty: 90 TABLET | Refills: 1 | Status: SHIPPED | OUTPATIENT
Start: 2021-02-08 | End: 2021-02-11 | Stop reason: SDUPTHER

## 2021-02-08 NOTE — TELEPHONE ENCOUNTER
Patient called for refill on Lisinopril  It was recently increased to 10 mg & she was using up her 5mg tablets  Now needs new script showing increase sent to [facility] for a 90 day supply with refills available

## 2021-02-11 DIAGNOSIS — I10 ESSENTIAL HYPERTENSION: ICD-10-CM

## 2021-02-11 RX ORDER — LISINOPRIL 10 MG/1
10 TABLET ORAL DAILY
Qty: 90 TABLET | Refills: 1 | Status: SHIPPED | OUTPATIENT
Start: 2021-02-11 | End: 2021-06-09 | Stop reason: SDUPTHER

## 2021-02-11 NOTE — TELEPHONE ENCOUNTER
Patient requesting a 90 day supply of Lisinopril sent to [facility]  Prescription was sent to wrong pharmacy

## 2021-06-02 ENCOUNTER — RA CDI HCC (OUTPATIENT)
Dept: OTHER | Facility: HOSPITAL | Age: 66
End: 2021-06-02

## 2021-06-02 NOTE — PROGRESS NOTES
[facility]  coding opportunities          Chart reviewed, no opportunity found: CHART REVIEWED, NO OPPORTUNITY FOUND              Patients insurance company:  [employer] [facility] (Medicare Advantage and Commercial)

## 2021-06-03 LAB
BUN SERPL-MCNC: 19 MG/DL (ref 7–25)
BUN/CREAT SERPL: ABNORMAL (CALC) (ref 6–22)
CALCIUM SERPL-MCNC: 9.7 MG/DL (ref 8.6–10.4)
CHLORIDE SERPL-SCNC: 105 MMOL/L (ref 98–110)
CO2 SERPL-SCNC: 26 MMOL/L (ref 20–32)
CREAT SERPL-MCNC: 0.76 MG/DL (ref 0.5–0.99)
GLUCOSE SERPL-MCNC: 107 MG/DL (ref 65–99)
POTASSIUM SERPL-SCNC: 4.2 MMOL/L (ref 3.5–5.3)
SL AMB EGFR AFRICAN AMERICAN: 97 ML/MIN/1.73M2
SL AMB EGFR NON AFRICAN AMERICAN: 83 ML/MIN/1.73M2
SODIUM SERPL-SCNC: 141 MMOL/L (ref 135–146)

## 2021-06-08 PROBLEM — E04.1 LEFT THYROID NODULE: Status: RESOLVED | Noted: 2017-11-21 | Resolved: 2021-06-08

## 2021-06-08 NOTE — ASSESSMENT & PLAN NOTE
Lab Results   Component Value Date    HGBA1C 6 0 (H) 03/13/2020    HGBA1C 5 8 (H) 10/01/2019     Lab Results   Component Value Date    GLUF 100 (H) 07/10/2017    LDLCALC 86 11/24/2020    CREATININE 0 76 06/02/2021     Most recent fasting glucose 107    Counseled patient on the importance of lifestyle interventions, specifically discussed a whole food, plant based diet low in saturated fat and processed foods  Discussed the importance of a diet that is rich in whole grains, fruits and vegetables, beans and legumes  Reviewed importance of regular physical activity

## 2021-06-08 NOTE — PROGRESS NOTES
FAMILY MEDICINE PROGRESS NOTE    Date of Service: 21  Primary Care Provider:   Tasia Goncalves MD       Name: Libia Nevarez       : 1955       Age:65 y o  Sex: female      MRN: [id number]      Chief Complaint:Follow-up (6 months)       ASSESSMENT and PLAN:  Libia Nevarez is a 63 y o  female with:     Problem List Items Addressed This Visit        Cardiovascular and Mediastinum    Essential hypertension     BP Readings from Last 3 Encounters:   21 132/86   21 140/90   20 144/94     Lab Results   Component Value Date    CREATININE 0 76 2021    EGFR >60 0 07/10/2017     Not well controlled today, will add lisinopril 5 mg to treatment regimen in addition to nadolol 30 mg  She is to return for nurse visit in 4-6 weeks  Follow-up in 6 months  Relevant Medications    lisinopril (ZESTRIL) 10 mg tablet    nadolol (CORGARD) 20 mg tablet    Other Relevant Orders    Basic metabolic panel       Nervous and Auditory    Essential tremor     Tremors improved with Nadolol 30mg daily, continue current management             Musculoskeletal and Integument    Age-related osteoporosis without current pathological fracture     Osteoporosis diagnosed in 2016 with T score of -2 6, started on Ibandronate (Boniva 450mg monthly) in 2017  Repeat DEXA in  showed improvement to osteopenia and repeat DEXA in 2020 with T-score of -1 9 in the femur, other wise normal T score in lumbar spine, hip, forearm     Continue treatment through 2022   Will repeat DEXA following completion of Boniva           Tinea pedis of both feet     Recommended topical antifungal such as tinactin or Lamisil             Other    Mixed hyperlipidemia     Lab Results   Component Value Date    CHOLESTEROL 146 2020    HDL 37 (L) 2020    [address] 86 2020    TRIG 131 2020     The 10-year ASCVD risk score (Angelica Tom et al , 2013) is: 6 5%    Values used to calculate the score:      Age: 63 years      Sex: Female      Is Non- : No      Diabetic: No      Tobacco smoker: No      Systolic Blood Pressure: 453 mmHg      Is BP treated: Yes      HDL Cholesterol: 37 mg/dL      Total Cholesterol: 146 mg/dL    Continue Atorvastatin 20mg         Relevant Medications    atorvastatin (LIPITOR) 20 mg tablet    Other Relevant Orders    Lipid Panel with Direct LDL reflex    Prediabetes - Primary     Lab Results   Component Value Date    HGBA1C 6 0 (H) 03/13/2020    HGBA1C 5 8 (H) 10/01/2019     Lab Results   Component Value Date    GLUF 100 (H) 07/10/2017    LDLCALC 86 11/24/2020    CREATININE 0 76 06/02/2021     Most recent fasting glucose 107    Counseled patient on the importance of lifestyle interventions, specifically discussed a whole food, plant based diet low in saturated fat and processed foods  Discussed the importance of a diet that is rich in whole grains, fruits and vegetables, beans and legumes  Reviewed importance of regular physical activity  Relevant Orders    Basic metabolic panel    Hemoglobin A1c (w/out EAG)    Class 1 obesity due to excess calories with serious comorbidity and body mass index (BMI) of 30 0 to 30 9 in adult          SUBJECTIVE:  Nicole Funez is a 63 y o  female with history of hypertension, essential tremor, hyperlipidemia who presents today with a chief complaint of Follow-up (6 months)  HPI     She was last seen for physical in December, then virtual visit for Lai in January  Hypertension  She is currently on nadolol (primarily for tremor and lisinopril 10 mg daily  She was walking more, but had to stop due to hip pain  She reports that her blood pressure was lower when she was walking more  Hip Tendonitis  She saw ortho at [facility], was told she has tendonitis  She started PT yesterday  She has been taking Advil for pain, currently only taking two a day  She does find this helps with her pain  Osteoporosis  Diagnosed in June 2016  T score of -2 6, started on Ibandronate (Boniva 450mg monthly) in January 2017 and repeat DEXA in 2018 showed improvement to osteopenia  DEXA in July with T-score of -1 9  Reflux  She reports intermittent episodes of reflux, with burning in epigastric area  She reports it is worse when she is laying down, she will have sensation of needing to burp  She does snack later in the evening on popcorn, crackers, or nuts  She cannot identify any foods that trigger her symptoms  The episodes last only 2 minutes  She has been taking Tums  She denies abdominal pain, nausea, change in bowel habit  Rash on Feet  She reports she has rash on her feet, primarily during the summer  She put neosporin on her foot  Prediabetes  She has been prediabetic for some times  She does have family history of diabetes, she does not want to get diabetes  She was walking more until her hip was bothering her  She does try to eat healthy  Review of Systems   Constitutional: Negative for appetite change  HENT: Negative for congestion  Respiratory: Negative for shortness of breath  Cardiovascular: Negative for chest pain  Gastrointestinal: Negative for abdominal pain, constipation, diarrhea, nausea and vomiting  Relux   Musculoskeletal: Positive for arthralgias (hip pain, right)  Skin: Positive for rash  I have reviewed the patient's Past Medical History      Current Outpatient Medications:     atorvastatin (LIPITOR) 20 mg tablet, Take 1 tablet (20 mg total) by mouth daily, Disp: 90 tablet, Rfl: 3    Calcium Carbonate-Vitamin D (CALCIUM 600+D) 600-400 MG-UNIT per tablet, Take 1 tablet by mouth daily, Disp: , Rfl:     cetirizine (ZyrTEC) 5 MG chewable tablet, Chew 5 mg daily, Disp: , Rfl:     ibandronate (BONIVA) 150 MG tablet, TAKE 1 TABLET BY MOUTH MONTHLY, Disp: 3 tablet, Rfl: 3    lisinopril (ZESTRIL) 10 mg tablet, Take 1 tablet (10 mg total) by mouth daily, Disp: 90 tablet, Rfl: 1    Multiple Vitamins-Minerals (MULTIVITAMIN ADULT PO), Take 1 tablet by mouth daily, Disp: , Rfl:     nadolol (CORGARD) 20 mg tablet, Take 1 5 tablets (30 mg total) by mouth daily at bedtime, Disp: 135 tablet, Rfl: 1    Omega-3 Fatty Acids (FISH OIL) 645 MG CAPS, Take 1 capsule by mouth daily, Disp: , Rfl:     RESTASIS 0 05 % ophthalmic emulsion, INSTILL 1 DROP INTO BOTH EYES EVERY 12 HOURS (Patient taking differently: as needed ), Disp: 180 mL, Rfl: 0    sodium chloride (OCEAN) 0 65 % nasal spray, 1 spray into each nostril as needed for rhinitis for up to 30 days, Disp: 10 mL, Rfl: 0    valACYclovir (VALTREX) 1,000 mg tablet, Take 1 tablet (1,000 mg total) by mouth 2 (two) times a day for 14 days (Patient taking differently: Take 1,000 mg by mouth as needed ), Disp: 90 tablet, Rfl: 3    OBJECTIVE:  /86   Pulse 66   Temp 97 5 °F (36 4 °C)   Resp 14   Ht 5' 7" (1 702 m)   Wt 86 2 kg (190 lb)   SpO2 99%   BMI 29 76 kg/m²    BP Readings from Last 3 Encounters:   06/09/21 132/86   01/06/21 140/90   12/31/20 144/94      Wt Readings from Last 3 Encounters:   06/09/21 86 2 kg (190 lb)   01/06/21 87 1 kg (192 lb)   12/31/20 86 9 kg (191 lb 9 6 oz)      Physical Exam  Constitutional:       General: She is not in acute distress  Appearance: Normal appearance  She is not ill-appearing or toxic-appearing  HENT:      Head: Normocephalic and atraumatic  Right Ear: External ear normal       Left Ear: External ear normal       Nose: Nose normal       Mouth/Throat:      Mouth: Mucous membranes are moist    Eyes:      Extraocular Movements: Extraocular movements intact  Conjunctiva/sclera: Conjunctivae normal    Neck:      Musculoskeletal: Normal range of motion and neck supple  Cardiovascular:      Rate and Rhythm: Normal rate and regular rhythm  Pulses: Normal pulses  Heart sounds: Normal heart sounds  No murmur  No friction rub  No gallop      Pulmonary: Effort: Pulmonary effort is normal  No respiratory distress  Breath sounds: Normal breath sounds  Musculoskeletal: Normal range of motion  Right lower leg: No edema  Feet:      Comments: Tinea pedis   Skin:     Findings: No erythema or rash  Neurological:      General: No focal deficit present  Mental Status: She is alert and oriented to person, place, and time  Psychiatric:         Mood and Affect: Mood normal          Behavior: Behavior normal          Lab Results   Component Value Date    HGBA1C 6 0 (H) 03/13/2020     Lab Results   Component Value Date    SODIUM 141 06/02/2021    K 4 2 06/02/2021     06/02/2021    CO2 26 06/02/2021    BUN 19 06/02/2021    CREATININE 0 76 06/02/2021    GLUC 107 (H) 06/02/2021    CALCIUM 9 7 06/02/2021     Lab Results   Component Value Date    CHOLESTEROL 146 11/24/2020    CHOLESTEROL 140 03/13/2020    CHOLESTEROL 228 (H) 10/01/2019     Lab Results   Component Value Date    HDL 37 (L) 11/24/2020    HDL 33 (L) 03/13/2020    HDL 44 (L) 10/01/2019     Lab Results   Component Value Date    TRIG 131 11/24/2020    TRIG 131 03/13/2020    TRIG 191 (H) 10/01/2019     No results found for: [facility]  Lab Results   Component Value Date    [facility] 86 11/24/2020       BMI Counseling: Body mass index is 29 76 kg/m²  The BMI is above normal  Nutrition recommendations include encouraging healthy choices of fruits and vegetables, consuming healthier snacks, moderation in carbohydrate intake and reducing intake of saturated and trans fat  Exercise recommendations include moderate physical activity 150 minutes/week and strength training exercises  No pharmacotherapy was ordered  Patient referred to PCP due to patient being overweight  Return in about 6 months (around 12/9/2021) for Annual physical     Frank MD Aleksandar    Note: Portions of the record have been created with voice recognition software    Occasional wrong word or "sound a like" substitutions may have occurred due to the inherent limitations of voice recognition software  Read the chart carefully and recognize, using context, where substitutions have occurred

## 2021-06-09 ENCOUNTER — OFFICE VISIT (OUTPATIENT)
Dept: FAMILY MEDICINE CLINIC | Facility: CLINIC | Age: 66
End: 2021-06-09
Payer: COMMERCIAL

## 2021-06-09 VITALS
DIASTOLIC BLOOD PRESSURE: 86 MMHG | OXYGEN SATURATION: 99 % | TEMPERATURE: 97.5 F | HEIGHT: 67 IN | RESPIRATION RATE: 14 BRPM | SYSTOLIC BLOOD PRESSURE: 132 MMHG | WEIGHT: 190 LBS | HEART RATE: 66 BPM | BODY MASS INDEX: 29.82 KG/M2

## 2021-06-09 DIAGNOSIS — M81.0 AGE-RELATED OSTEOPOROSIS WITHOUT CURRENT PATHOLOGICAL FRACTURE: ICD-10-CM

## 2021-06-09 DIAGNOSIS — G25.0 ESSENTIAL TREMOR: ICD-10-CM

## 2021-06-09 DIAGNOSIS — B35.3 TINEA PEDIS OF BOTH FEET: ICD-10-CM

## 2021-06-09 DIAGNOSIS — R73.03 PREDIABETES: Primary | ICD-10-CM

## 2021-06-09 DIAGNOSIS — I10 ESSENTIAL HYPERTENSION: ICD-10-CM

## 2021-06-09 DIAGNOSIS — E66.09 CLASS 1 OBESITY DUE TO EXCESS CALORIES WITH SERIOUS COMORBIDITY AND BODY MASS INDEX (BMI) OF 30.0 TO 30.9 IN ADULT: ICD-10-CM

## 2021-06-09 DIAGNOSIS — E78.2 MIXED HYPERLIPIDEMIA: ICD-10-CM

## 2021-06-09 PROCEDURE — 99214 OFFICE O/P EST MOD 30 MIN: CPT | Performed by: FAMILY MEDICINE

## 2021-06-09 PROCEDURE — 3079F DIAST BP 80-89 MM HG: CPT | Performed by: FAMILY MEDICINE

## 2021-06-09 PROCEDURE — 1036F TOBACCO NON-USER: CPT | Performed by: FAMILY MEDICINE

## 2021-06-09 PROCEDURE — 3008F BODY MASS INDEX DOCD: CPT | Performed by: FAMILY MEDICINE

## 2021-06-09 PROCEDURE — 3075F SYST BP GE 130 - 139MM HG: CPT | Performed by: FAMILY MEDICINE

## 2021-06-09 RX ORDER — CETIRIZINE HYDROCHLORIDE 5 MG/1
5 TABLET, CHEWABLE ORAL AS NEEDED
COMMUNITY

## 2021-06-09 RX ORDER — ATORVASTATIN CALCIUM 20 MG/1
20 TABLET, FILM COATED ORAL DAILY
Qty: 90 TABLET | Refills: 3 | Status: SHIPPED | OUTPATIENT
Start: 2021-06-09

## 2021-06-09 RX ORDER — LISINOPRIL 10 MG/1
10 TABLET ORAL DAILY
Qty: 90 TABLET | Refills: 1 | Status: SHIPPED | OUTPATIENT
Start: 2021-06-09 | End: 2021-12-08 | Stop reason: SDUPTHER

## 2021-06-09 RX ORDER — NADOLOL 20 MG/1
30 TABLET ORAL
Qty: 135 TABLET | Refills: 1 | Status: SHIPPED | OUTPATIENT
Start: 2021-06-09 | End: 2021-12-08 | Stop reason: SDUPTHER

## 2021-06-09 NOTE — ASSESSMENT & PLAN NOTE
Lab Results   Component Value Date    CHOLESTEROL 146 11/24/2020    HDL 37 (L) 11/24/2020    LDLCALC 86 11/24/2020    TRIG 131 11/24/2020     The 10-year ASCVD risk score (Gume Hoff et al , 2013) is: 6 5%    Values used to calculate the score:      Age: 63 years      Sex: Female      Is Non- : No      Diabetic: No      Tobacco smoker: No      Systolic Blood Pressure: 995 mmHg      Is BP treated: Yes      HDL Cholesterol: 37 mg/dL      Total Cholesterol: 146 mg/dL    Continue Atorvastatin 20mg

## 2021-06-09 NOTE — ASSESSMENT & PLAN NOTE
BP Readings from Last 3 Encounters:   06/09/21 132/86   01/06/21 140/90   12/31/20 144/94     Lab Results   Component Value Date    CREATININE 0 76 06/02/2021    EGFR >60 0 07/10/2017     Not well controlled today, will add lisinopril 5 mg to treatment regimen in addition to nadolol 30 mg  She is to return for nurse visit in 4-6 weeks  Follow-up in 6 months

## 2021-06-09 NOTE — ASSESSMENT & PLAN NOTE
Osteoporosis diagnosed in June 2016 with T score of -2 6, started on Ibandronate (Boniva 450mg monthly) in January 2017  Repeat DEXA in 2018 showed improvement to osteopenia and repeat DEXA in July 2020 with T-score of -1 9 in the femur, other wise normal T score in lumbar spine, hip, forearm     Continue treatment through January 2022   Will repeat DEXA following completion of Boniva

## 2021-06-09 NOTE — PATIENT INSTRUCTIONS
Mediterranean Diet   AMBULATORY CARE:   A Mediterranean diet  is a meal plan that includes foods that are commonly eaten in countries that border the [place]  This meal plan may provide several health benefits  These include losing or maintaining weight, and decreasing blood pressure, blood sugar, and cholesterol levels  It may also help protect against certain health conditions such as heart disease, cancer, type 2 diabetes, and Alzheimer disease  Work with a dietitian to develop a meal plan that is right for you  Foods to include in the [address] [facility] diet:   · Include fruits and vegetables in each meal   Eat a variety of fresh fruits and vegetables  · Choose whole grains every day  These foods include whole-grain breads, pastas, and cereals  It also includes brown rice, quinoa, and millet  · Use unsaturated fats instead of saturated fats  Cook with olive or canola oil  Limit saturated fats, such as butter, margarine, and shortening  Saturated fat is an unhealthy fat that can increase your cholesterol levels  · Choose plant foods, poultry, and fish as your main sources of protein  ? Eat plant-based foods that provide protein,  such as lentils, beans, chickpeas, nuts, and seeds  Choose mostly plant-based foods in place of meat on most days of the week  ? Eat protein foods high in omega-3 fats  Fish high in omega-3 fats include salmon, trout, and tuna  Include these types of fish 1 or 2 times each week  Limit fish high in mercury, such as shark, swordfish, tilefish, and thu mackerel  Omega-3 fats are also found in walnuts and flaxseed  ? Choose poultry (chicken or turkey)  without skin instead of red meat  Red meat is high in saturated fat  Limit eggs and high-fat meats, such as otero, sausage, and hot dogs  · Choose low-fat dairy foods  such as nonfat or 1% milk, or low-fat almond, cashew, or soy milk  Other examples include low-fat cheese, yogurt, and cottage cheese  · Limit sweets  Limit your intake of high-sugar foods, such as soda, desserts, and candy  · Talk to your healthcare provider about alcohol  Studies have shown that moderate intake of wine may reduce the risk of heart disease  A moderate amount of wine is 1 serving for women and men 65 years and older each day  Two servings is recommended for men 24to 59years of age each day  A serving of wine is 5 ounces  Other things you need to know if you follow the Mediterranean diet:   · Include foods high in iron and vitamin C   Plant-based foods that are high in iron include spinach, beans, tofu, and artichoke  Eat a serving of vitamin C with any iron-rich food to help your body absorb more iron  Examples include oranges, strawberries, cantaloupe, broccoli, and yellow peppers  · Get regular physical activity  The Mediterranean diet will have the most benefit if you get regular physical activity  Get 30 minutes of physical activity at least 5 days a week  Choose physical activities that increase your heart rate  Examples include walking, hiking, swimming, and riding a bike  Ask your healthcare provider about the best exercise plan for you  © Copyright [address] Information is for End User's use only and may not be sold, redistributed or otherwise used for commercial purposes  All illustrations and images included in CareNotes® are the copyrighted property of A D A M , Inc  or Joseph Eller  The above information is an  only  It is not intended as medical advice for individual conditions or treatments  Talk to your doctor, nurse or pharmacist before following any medical regimen to see if it is safe and effective for you

## 2021-06-25 ENCOUNTER — OFFICE VISIT (OUTPATIENT)
Dept: FAMILY MEDICINE CLINIC | Facility: CLINIC | Age: 66
End: 2021-06-25
Payer: COMMERCIAL

## 2021-06-25 VITALS
OXYGEN SATURATION: 98 % | DIASTOLIC BLOOD PRESSURE: 90 MMHG | HEIGHT: 67 IN | WEIGHT: 193 LBS | RESPIRATION RATE: 14 BRPM | HEART RATE: 81 BPM | BODY MASS INDEX: 30.29 KG/M2 | SYSTOLIC BLOOD PRESSURE: 156 MMHG | TEMPERATURE: 98 F

## 2021-06-25 DIAGNOSIS — L30.4 INTERTRIGO: ICD-10-CM

## 2021-06-25 DIAGNOSIS — L23.7 POISON IVY DERMATITIS: Primary | ICD-10-CM

## 2021-06-25 PROCEDURE — 3008F BODY MASS INDEX DOCD: CPT | Performed by: FAMILY MEDICINE

## 2021-06-25 PROCEDURE — 99213 OFFICE O/P EST LOW 20 MIN: CPT | Performed by: FAMILY MEDICINE

## 2021-06-25 PROCEDURE — 3080F DIAST BP >= 90 MM HG: CPT | Performed by: FAMILY MEDICINE

## 2021-06-25 PROCEDURE — 3077F SYST BP >= 140 MM HG: CPT | Performed by: FAMILY MEDICINE

## 2021-06-25 RX ORDER — BETAMETHASONE DIPROPIONATE 0.05 %
OINTMENT (GRAM) TOPICAL 2 TIMES DAILY
Qty: 15 G | Refills: 0 | Status: SHIPPED | OUTPATIENT
Start: 2021-06-25 | End: 2021-06-30 | Stop reason: SDUPTHER

## 2021-06-25 RX ORDER — CLOTRIMAZOLE AND BETAMETHASONE DIPROPIONATE 10; .64 MG/G; MG/G
CREAM TOPICAL 2 TIMES DAILY
Qty: 30 G | Refills: 0 | Status: SHIPPED | OUTPATIENT
Start: 2021-06-25 | End: 2021-12-08 | Stop reason: ALTCHOICE

## 2021-06-25 RX ORDER — PRENATAL VIT 91/IRON/FOLIC/DHA 28-975-200
COMBINATION PACKAGE (EA) ORAL 2 TIMES DAILY
Qty: 30 G | Refills: 0 | Status: SHIPPED | OUTPATIENT
Start: 2021-06-25 | End: 2021-12-08 | Stop reason: ALTCHOICE

## 2021-06-25 NOTE — PATIENT INSTRUCTIONS
Apply Lotrisone to rash on lower abdomen and between breasts twice daily for 7 days, then apply Lamisil one to two times daily  Use betamethasone ointment on poison ivy (do not use on face)       Skin Yeast Infection   WHAT YOU NEED TO KNOW:   What do I need to know about a skin yeast infection? Yeast is normally present on the skin  Infection happens when you have too much yeast, or when it gets into a cut on your skin  Certain types of mold and fungus can cause a yeast infection  A skin yeast infection can appear anywhere on your skin or nail beds  Skin yeast infections are usually found on warm, moist parts of the body  Examples include between skin folds or under the breasts  What increases my risk for a skin yeast infection? · Elderly age, especially as skin gets thinner and tears more easily    · Obesity that causes skin folds where moisture can collect    · Diapers that are not changed regularly and allow moisture to sit on your baby's skin    · Diabetes, especially if it is not controlled    · Bedrest that allows moisture to collect on your skin    · Immune system problems    · Certain medicines, including antibiotics or medicines that weaken your immune system    · Pregnancy or hormone changes    · Moisture left on your feet or between your toes after you bathe, or that builds up under a ring you wear    What are the signs and symptoms of a skin yeast infection? Signs and symptoms will depend on the type of yeast causing the infection, and where the infection is located  · Red, scaly skin    · Changes in skin color, especially a beefy red color    · Itching, dry skin    · Painful, cracking skin at the corners of your mouth    · Thick, discolored, chipping nails    · Skin lesions that may be red or purple and round    · Pus bumps    How is a skin yeast infection diagnosed and treated? Your healthcare provider may know you have a skin yeast infection from your signs and symptoms   He may take a sample of your skin to check for fungus  He may also look at areas of your skin under ultraviolet light to show which type of yeast infection you have  You may be given an antifungal cream or ointment to treat the infection  You may be given antifungal medicine as a pill if your infection is severe  How do I care for the skin near the infection? You may only have discolored patches of skin, or areas that are dry and flaking  Care for these skin problems as directed by your healthcare provider  If you have painful skin or an open sore, you will need to protect the skin and prevent damage  You will also need to keep the skin dry as much as possible  Ask your healthcare provider how to care for your skin while the infection clears  The following are general guidelines for caring for painful or open skin:  · Keep the skin clean  Ask your healthcare provider if you should wash with mild soap and water  Do not use soap that contains alcohol  Alcohol can dry and irritate the skin and make symptoms worse  Your baby's healthcare provider may tell you to use diaper cream or ointment when you change his diaper  This will protect the skin and prevent moisture from collecting  · Keep the skin dry  Pat the area dry with a towel  Do not rub, because this may irritate the skin  If you have a skin yeast infection between skin folds, lift the top part gently and hold it while you dry between your skin folds  Always dry your feet completely after you swim or bathe, including between your toes  Dry your skin if you are sweating from exercise or exposure to heat  Use a clean towel each time to prevent spreading or continuing the infection  · Keep the skin protected  Ask your healthcare provider if you should cover the area with a bandage or leave it open  Check your skin each day to make sure you do not have new or worsening problems  You may need to have someone check the skin if you cannot see the area easily      What can I do to prevent a skin yeast infection? · Do not share clothing or towels    · Wear shower shoes if you need to use a public shower    · Dry your feet completely after you bathe, and apply antifungal powder or cream as directed    · Put on socks before you get dressed so you do not spread fungus from your feet    · Wear light clothing that allows air to get to your skin    · Manage your weight to prevent skin folds where yeast can collect    · Manage diabetes    · Change your baby's diaper often, and keep the area clean and dry as much as possible    · Use a diaper cream or ointment that contains zinc oxide or dimethicone on your baby's diaper area as directed    When should I seek immediate care? · You have signs of infection, such as pus, warmth or red streaks coming from the wound, or a fever  When should I contact my healthcare provider? · Your symptoms worsen or do not get better within 7 to 10 days  · You have new or returning signs of a skin yeast infection after treatment  · You have questions or concerns about your condition or care  CARE AGREEMENT:   You have the right to help plan your care  Learn about your health condition and how it may be treated  Discuss treatment options with your healthcare providers to decide what care you want to receive  You always have the right to refuse treatment  The above information is an  only  It is not intended as medical advice for individual conditions or treatments  Talk to your doctor, nurse or pharmacist before following any medical regimen to see if it is safe and effective for you  © Copyright [address] Information is for End User's use only and may not be sold, redistributed or otherwise used for commercial purposes   All illustrations and images included in CareNotes® are the copyrighted property of [employer]  or [address] [place] [address] [employer]

## 2021-06-25 NOTE — PROGRESS NOTES
FAMILY MEDICINE PROGRESS NOTE    Date of Service: 21  Primary Care Provider:   Joseph Griffith MD       Name: Katerin Esposito       : 1955       Age:65 y o  Sex: female      MRN: [id number]      Chief Complaint:Poison [place] and Rash       ASSESSMENT and PLAN:  Katerin Esposito is a 63 y o  female with:     1  Poison ivy dermatitis  Treat with topical corticosteroid, advised continued use of antihistamine for itching  She has already washed clothes she wore while camping    - betamethasone dipropionate (DIPROSONE) 0 05 % ointment; Apply topically 2 (two) times a day Apply to poison ivy  Dispense: 15 g; Refill: 0    2  Intertrigo  Under breasts and lower abdomen, will treat with one week of Lotrisone as there appears to be some poison ivy as well, skin is very inflamed, follow with daily terbinafine    - clotrimazole-betamethasone (LOTRISONE) 1-0 05 % cream; Apply topically 2 (two) times a day for 7 days Apply on lower abdomen and under breasts  Dispense: 30 g; Refill: 0  - terbinafine (LamISIL) 1 % cream; Apply topically 2 (two) times a day Apply to lower abdomen and under breasts  Dispense: 30 g; Refill: 0    SUBJECTIVE:  Katerin Esposito is a 63 y o  female who presents today with a chief complaint of Poison Ivy and Rash  HPI     She reports rash in lower abdomen/suprapubic area as well as between breasts  She also has rash on back of neck, bilateral thighs, arm  The rash is mildly pruritic, she has used benadryl  She was camping this past weekend with her daughter's dog who was often on her lap  Review of Systems   Skin: Positive for rash  I have reviewed the patient's Past Medical History      Current Outpatient Medications:     atorvastatin (LIPITOR) 20 mg tablet, Take 1 tablet (20 mg total) by mouth daily, Disp: 90 tablet, Rfl: 3    Calcium Carbonate-Vitamin D (CALCIUM 600+D) 600-400 MG-UNIT per tablet, Take 1 tablet by mouth daily, Disp: , Rfl:     cetirizine (ZyrTEC) 5 MG chewable tablet, Chew 5 mg as needed , Disp: , Rfl:     ibandronate (BONIVA) 150 MG tablet, TAKE 1 TABLET BY MOUTH MONTHLY, Disp: 3 tablet, Rfl: 3    lisinopril (ZESTRIL) 10 mg tablet, Take 1 tablet (10 mg total) by mouth daily, Disp: 90 tablet, Rfl: 1    Multiple Vitamins-Minerals (MULTIVITAMIN ADULT PO), Take 1 tablet by mouth daily, Disp: , Rfl:     nadolol (CORGARD) 20 mg tablet, Take 1 5 tablets (30 mg total) by mouth daily at bedtime, Disp: 135 tablet, Rfl: 1    Omega-3 Fatty Acids (FISH OIL) 645 MG CAPS, Take 1 capsule by mouth daily, Disp: , Rfl:     RESTASIS 0 05 % ophthalmic emulsion, INSTILL 1 DROP INTO BOTH EYES EVERY 12 HOURS (Patient taking differently: as needed ), Disp: 180 mL, Rfl: 0    sodium chloride (OCEAN) 0 65 % nasal spray, 1 spray into each nostril as needed for rhinitis for up to 30 days, Disp: 10 mL, Rfl: 0    valACYclovir (VALTREX) 1,000 mg tablet, Take 1 tablet (1,000 mg total) by mouth 2 (two) times a day for 14 days (Patient taking differently: Take 1,000 mg by mouth as needed ), Disp: 90 tablet, Rfl: 3    betamethasone dipropionate (DIPROSONE) 0 05 % ointment, Apply topically 2 (two) times a day Apply to poison ivy, Disp: 15 g, Rfl: 0    clotrimazole-betamethasone (LOTRISONE) 1-0 05 % cream, Apply topically 2 (two) times a day for 7 days Apply on lower abdomen and under breasts, Disp: 30 g, Rfl: 0    terbinafine (LamISIL) 1 % cream, Apply topically 2 (two) times a day Apply to lower abdomen and under breasts, Disp: 30 g, Rfl: 0    OBJECTIVE:  /90   Pulse 81   Temp 98 °F (36 7 °C)   Resp 14   Ht 5' 7" (1 702 m)   Wt 87 5 kg (193 lb)   SpO2 98%   BMI 30 23 kg/m²    BP Readings from Last 3 Encounters:   06/25/21 156/90   06/09/21 132/86   01/06/21 140/90      Wt Readings from Last 3 Encounters:   06/25/21 87 5 kg (193 lb)   06/09/21 86 2 kg (190 lb)   01/06/21 87 1 kg (192 lb)      Physical Exam  Constitutional:       General: She is not in acute distress       Appearance: She is not ill-appearing or toxic-appearing  Skin:     Findings: Erythema and rash present  Rash is vesicular  Neurological:      Mental Status: She is alert  Return if symptoms worsen or fail to improve  Dago Long MD    Note: Portions of the record have been created with voice recognition software  Occasional wrong word or "sound a like" substitutions may have occurred due to the inherent limitations of voice recognition software  Read the chart carefully and recognize, using context, where substitutions have occurred

## 2021-06-30 ENCOUNTER — TELEPHONE (OUTPATIENT)
Dept: FAMILY MEDICINE CLINIC | Facility: CLINIC | Age: 66
End: 2021-06-30

## 2021-06-30 DIAGNOSIS — L23.7 POISON IVY DERMATITIS: Primary | ICD-10-CM

## 2021-06-30 DIAGNOSIS — L30.4 INTERTRIGO: ICD-10-CM

## 2021-06-30 RX ORDER — BETAMETHASONE DIPROPIONATE 0.05 %
OINTMENT (GRAM) TOPICAL 2 TIMES DAILY
Qty: 15 G | Refills: 0 | Status: SHIPPED | OUTPATIENT
Start: 2021-06-30 | End: 2021-12-08 | Stop reason: ALTCHOICE

## 2021-06-30 RX ORDER — NYSTATIN 100000 U/G
CREAM TOPICAL 2 TIMES DAILY
Qty: 30 G | Refills: 0 | Status: SHIPPED | OUTPATIENT
Start: 2021-06-30 | End: 2021-12-08 | Stop reason: ALTCHOICE

## 2021-06-30 RX ORDER — HYDROXYZINE HYDROCHLORIDE 25 MG/1
25 TABLET, FILM COATED ORAL EVERY 6 HOURS PRN
Qty: 30 TABLET | Refills: 0 | Status: SHIPPED | OUTPATIENT
Start: 2021-06-30 | End: 2021-12-08 | Stop reason: ALTCHOICE

## 2021-06-30 NOTE — TELEPHONE ENCOUNTER
She is on the highest dose of creams I would recommend for her conditions  She should be using the betamethasone only cream on poison ivy on extremities and neck  The poison ivy will take time to improve  Can try oatmela or cornstarch bath to help with symptoms as well    She should use of betamethasone-clobetasol cream on lower abdomen under breasts  I can send nystatin cream to try on these areas in addition to the Lotrisone cream     Will send prescription for oral hydroxyzine to take as needed for itching

## 2021-06-30 NOTE — TELEPHONE ENCOUNTER
Patient was seen last week for two different rashes  Both have gotten worse  Creams are not helping  Itching is so bad patient takes Benadryl but that only helps symptoms for a short time  Can something different be ordered?     [facility]

## 2021-07-02 RX ORDER — PREDNISONE 10 MG/1
TABLET ORAL
Qty: 38 TABLET | Refills: 0 | Status: SHIPPED | OUTPATIENT
Start: 2021-07-02 | End: 2021-07-15

## 2021-07-02 NOTE — TELEPHONE ENCOUNTER
Poison ivy does not spread, but there can be delayed onset of new lesions from previous exposure or from new exposure  She should be sure to wash clothes even sheets/towels to remove oils  Once you come in contact you need to wash skin ASAP  No need to see derm, it would take several months to get an appointment  Since she still has symptoms will send for steroid taper to take for 13 days  advise she take in the morning with food

## 2021-07-02 NOTE — TELEPHONE ENCOUNTER
Patient called stating the poison ivy is spreading   Patient would like to know if she should see a dermatologist  She would like to know what her next steps would be

## 2021-12-03 ENCOUNTER — APPOINTMENT (OUTPATIENT)
Dept: LAB | Facility: MEDICAL CENTER | Age: 66
End: 2021-12-03
Payer: COMMERCIAL

## 2021-12-03 DIAGNOSIS — R73.03 PRE-DIABETES: Primary | ICD-10-CM

## 2021-12-03 LAB
ANION GAP SERPL CALCULATED.3IONS-SCNC: 4 MMOL/L (ref 4–13)
BUN SERPL-MCNC: 21 MG/DL (ref 5–25)
CALCIUM SERPL-MCNC: 9.4 MG/DL (ref 8.3–10.1)
CHLORIDE SERPL-SCNC: 109 MMOL/L (ref 100–108)
CHOLEST SERPL-MCNC: 188 MG/DL
CO2 SERPL-SCNC: 27 MMOL/L (ref 21–32)
CREAT SERPL-MCNC: 0.8 MG/DL (ref 0.6–1.3)
EST. AVERAGE GLUCOSE BLD GHB EST-MCNC: 126 MG/DL
GFR SERPL CREATININE-BSD FRML MDRD: 78 ML/MIN/1.73SQ M
GLUCOSE P FAST SERPL-MCNC: 120 MG/DL (ref 65–99)
HBA1C MFR BLD: 6 %
HDLC SERPL-MCNC: 47 MG/DL
LDLC SERPL CALC-MCNC: 103 MG/DL (ref 0–100)
POTASSIUM SERPL-SCNC: 4.4 MMOL/L (ref 3.5–5.3)
SODIUM SERPL-SCNC: 140 MMOL/L (ref 136–145)
TRIGL SERPL-MCNC: 192 MG/DL

## 2021-12-03 PROCEDURE — 83036 HEMOGLOBIN GLYCOSYLATED A1C: CPT

## 2021-12-03 PROCEDURE — 36415 COLL VENOUS BLD VENIPUNCTURE: CPT

## 2021-12-03 PROCEDURE — 80061 LIPID PANEL: CPT

## 2021-12-03 PROCEDURE — 80048 BASIC METABOLIC PNL TOTAL CA: CPT

## 2021-12-08 ENCOUNTER — TELEPHONE (OUTPATIENT)
Dept: ADMINISTRATIVE | Facility: OTHER | Age: 66
End: 2021-12-08

## 2021-12-08 ENCOUNTER — OFFICE VISIT (OUTPATIENT)
Dept: FAMILY MEDICINE CLINIC | Facility: CLINIC | Age: 66
End: 2021-12-08
Payer: COMMERCIAL

## 2021-12-08 VITALS
RESPIRATION RATE: 18 BRPM | WEIGHT: 198 LBS | TEMPERATURE: 95.9 F | SYSTOLIC BLOOD PRESSURE: 162 MMHG | HEIGHT: 67 IN | OXYGEN SATURATION: 99 % | BODY MASS INDEX: 31.08 KG/M2 | DIASTOLIC BLOOD PRESSURE: 100 MMHG | HEART RATE: 65 BPM

## 2021-12-08 DIAGNOSIS — R00.2 PALPITATIONS: ICD-10-CM

## 2021-12-08 DIAGNOSIS — E78.2 MIXED HYPERLIPIDEMIA: ICD-10-CM

## 2021-12-08 DIAGNOSIS — E66.09 CLASS 1 OBESITY DUE TO EXCESS CALORIES WITH SERIOUS COMORBIDITY AND BODY MASS INDEX (BMI) OF 30.0 TO 30.9 IN ADULT: ICD-10-CM

## 2021-12-08 DIAGNOSIS — G25.0 ESSENTIAL TREMOR: ICD-10-CM

## 2021-12-08 DIAGNOSIS — I10 ESSENTIAL HYPERTENSION: Primary | ICD-10-CM

## 2021-12-08 DIAGNOSIS — Z00.00 ANNUAL PHYSICAL EXAM: ICD-10-CM

## 2021-12-08 DIAGNOSIS — M81.0 AGE-RELATED OSTEOPOROSIS WITHOUT CURRENT PATHOLOGICAL FRACTURE: ICD-10-CM

## 2021-12-08 DIAGNOSIS — R73.03 PREDIABETES: ICD-10-CM

## 2021-12-08 PROCEDURE — 3080F DIAST BP >= 90 MM HG: CPT | Performed by: FAMILY MEDICINE

## 2021-12-08 PROCEDURE — 99214 OFFICE O/P EST MOD 30 MIN: CPT | Performed by: FAMILY MEDICINE

## 2021-12-08 PROCEDURE — 1036F TOBACCO NON-USER: CPT | Performed by: FAMILY MEDICINE

## 2021-12-08 PROCEDURE — 99396 PREV VISIT EST AGE 40-64: CPT | Performed by: FAMILY MEDICINE

## 2021-12-08 PROCEDURE — 3725F SCREEN DEPRESSION PERFORMED: CPT | Performed by: FAMILY MEDICINE

## 2021-12-08 PROCEDURE — 3008F BODY MASS INDEX DOCD: CPT | Performed by: FAMILY MEDICINE

## 2021-12-08 PROCEDURE — 3077F SYST BP >= 140 MM HG: CPT | Performed by: FAMILY MEDICINE

## 2021-12-08 RX ORDER — LISINOPRIL 10 MG/1
10 TABLET ORAL DAILY
Qty: 90 TABLET | Refills: 1 | Status: SHIPPED | OUTPATIENT
Start: 2021-12-08

## 2021-12-08 RX ORDER — IBANDRONATE SODIUM 150 MG/1
150 TABLET, FILM COATED ORAL
Qty: 3 TABLET | Refills: 0 | Status: SHIPPED | OUTPATIENT
Start: 2021-12-08 | End: 2022-04-11

## 2021-12-08 RX ORDER — NADOLOL 20 MG/1
30 TABLET ORAL
Qty: 135 TABLET | Refills: 1 | Status: SHIPPED | OUTPATIENT
Start: 2021-12-08 | End: 2022-06-01

## 2022-04-10 NOTE — ASSESSMENT & PLAN NOTE
Osteoporosis diagnosed in June 2016 with T score of -2 6, started on Ibandronate (Boniva 450mg monthly) in January 2017  Repeat DEXA in 2018 showed improvement to osteopenia and repeat DEXA in July 2020 with T-score of -1 9 in the femur, other wise normal T score in lumbar spine, hip, forearm     She has completed 5 years of treatment, will repeat DEXA

## 2022-04-10 NOTE — PROGRESS NOTES
FAMILY MEDICINE PROGRESS NOTE    Date of Service: 22  Primary Care Provider:   Octavia Banks MD       Name: Iwona Roberts       : 1955       Age:66 y o  Sex: female      MRN: [id number]      Chief Complaint:Follow-up (4 month)       ASSESSMENT and PLAN:  Iwona Roberts is a 61 y o  female with:     Problem List Items Addressed This Visit        Cardiovascular and Mediastinum    Essential hypertension - Primary     BP Readings from Last 3 Encounters:   22 138/84   21 162/100   21 156/90     Lab Results   Component Value Date    CREATININE 0 80 2021    EGFR 78 2021     Controlled today, continue current management with nadolol 30 mg daily primarily for tremor and lisinopril 10 mg daily  Asked patient to keep a log of home readings to keep trends of blood pressure          Relevant Orders    Basic metabolic panel       Nervous and Auditory    Essential tremor     Continue Nadolol 30 mg            Musculoskeletal and Integument    Age-related osteoporosis without current pathological fracture     Osteoporosis diagnosed in 2016 with T score of -2 6, started on Ibandronate (Boniva 450mg monthly) in 2017  Repeat DEXA in  showed improvement to osteopenia and repeat DEXA in 2020 with T-score of -1 9 in the femur, other wise normal T score in lumbar spine, hip, forearm     She has completed 5 years of treatment, will repeat DEXA         Relevant Orders    DXA bone density spine hip and pelvis       Other    Mixed hyperlipidemia    Relevant Orders    Lipid panel    Palpitations     Symptoms have improved, generally less noticeable when she exercises  Encouraged regular exercise            Other Visit Diagnoses     Dry eye        refill restasis     Relevant Medications    cycloSPORINE (Restasis) 0 05 % ophthalmic emulsion    Breast cancer screening by mammogram        Relevant Orders    Mammo screening bilateral w 3d & cad          SUBJECTIVE:  Janet Valente Isela Staples is a 61 y o  female who presents today with a chief complaint of Follow-up (4 month)  HPI     Patient presents today for follow-up  She had elevated blood pressure at her last visit in December  This was in the setting of weight gain and less physical activity  She is on lisinopril 10 mg daily as well as nadolol 30 mg, though this is primarily for tremor  She also reported that she had had worsening palpitations at her last visit  Today she reports that she went to her dentist last week her blood pressure was elevated  She reports it was 150/90  She reports her home blood pressure reading at home is similar to her blood pressure here  She checks about 3 times a week  She is back to getting regular exercise, walking 3 to 5 miles  She reports her palpitations have improved  She overall feels better being more active  Patient has has also completed 5 years of Boniva at this point  Review of Systems   Eyes: Negative for visual disturbance  Respiratory: Negative for shortness of breath  Cardiovascular: Positive for palpitations  Negative for chest pain and leg swelling  Neurological: Negative for dizziness, light-headedness and headaches  I have reviewed the patient's Past Medical History      Current Outpatient Medications:     atorvastatin (LIPITOR) 20 mg tablet, Take 1 tablet (20 mg total) by mouth daily, Disp: 90 tablet, Rfl: 3    Calcium Carbonate-Vitamin D (CALCIUM 600+D) 600-400 MG-UNIT per tablet, Take 1 tablet by mouth daily, Disp: , Rfl:     cycloSPORINE (Restasis) 0 05 % ophthalmic emulsion, Administer 1 drop to both eyes as needed (dry eyes), Disp: 90 mL, Rfl: 2    lisinopril (ZESTRIL) 10 mg tablet, Take 1 tablet (10 mg total) by mouth daily, Disp: 90 tablet, Rfl: 1    Multiple Vitamins-Minerals (MULTIVITAMIN ADULT PO), Take 1 tablet by mouth daily, Disp: , Rfl:     nadolol (CORGARD) 20 mg tablet, Take 1 5 tablets (30 mg total) by mouth daily at bedtime, Disp: 135 tablet, Rfl: 1    cetirizine (ZyrTEC) 5 MG chewable tablet, Chew 5 mg as needed  (Patient not taking: Reported on 4/11/2022 ), Disp: , Rfl:     sodium chloride (OCEAN) 0 65 % nasal spray, 1 spray into each nostril as needed for rhinitis for up to 30 days (Patient not taking: Reported on 4/11/2022 ), Disp: 10 mL, Rfl: 0    valACYclovir (VALTREX) 1,000 mg tablet, Take 1 tablet (1,000 mg total) by mouth 2 (two) times a day for 14 days (Patient not taking: Reported on 4/11/2022 ), Disp: 90 tablet, Rfl: 3    OBJECTIVE:  /84 (BP Location: Left arm, Patient Position: Sitting, Cuff Size: Standard)   Pulse 62   Temp (!) 96 8 °F (36 °C)   Ht 5' 7" (1 702 m)   Wt 87 5 kg (193 lb)   SpO2 94%   BMI 30 23 kg/m²    BP Readings from Last 3 Encounters:   04/11/22 138/84   12/08/21 162/100   06/25/21 156/90      Wt Readings from Last 3 Encounters:   04/11/22 87 5 kg (193 lb)   12/08/21 89 8 kg (198 lb)   06/25/21 87 5 kg (193 lb)      Physical Exam  Constitutional:       General: She is not in acute distress  Appearance: Normal appearance  She is normal weight  She is not ill-appearing or toxic-appearing  HENT:      Head: Normocephalic and atraumatic  Right Ear: External ear normal       Left Ear: External ear normal       Nose: Nose normal       Mouth/Throat:      Mouth: Mucous membranes are moist    Eyes:      Extraocular Movements: Extraocular movements intact  Conjunctiva/sclera: Conjunctivae normal    Cardiovascular:      Rate and Rhythm: Normal rate and regular rhythm  Pulses: Normal pulses  Heart sounds: Normal heart sounds  No murmur heard  No friction rub  No gallop  Pulmonary:      Effort: Pulmonary effort is normal  No respiratory distress  Breath sounds: Normal breath sounds  No stridor  No wheezing, rhonchi or rales  Musculoskeletal:         General: Normal range of motion  Cervical back: Normal range of motion and neck supple  Right lower leg: No edema        Left lower leg: No edema  Skin:     General: Skin is warm and dry  Findings: No erythema or rash  Neurological:      General: No focal deficit present  Mental Status: She is alert and oriented to person, place, and time  Psychiatric:         Mood and Affect: Mood normal          Behavior: Behavior normal                 Return in about 7 months (around 10/27/2022) for Welcome to Medicare after 65  Samantha West MD    Note: Portions of the record have been created with voice recognition software  Occasional wrong word or "sound a like" substitutions may have occurred due to the inherent limitations of voice recognition software  Read the chart carefully and recognize, using context, where substitutions have occurred

## 2022-04-10 NOTE — ASSESSMENT & PLAN NOTE
BP Readings from Last 3 Encounters:   04/11/22 138/84   12/08/21 162/100   06/25/21 156/90     Lab Results   Component Value Date    CREATININE 0 80 12/03/2021    EGFR 78 12/03/2021     Controlled today, continue current management with nadolol 30 mg daily primarily for tremor and lisinopril 10 mg daily  Asked patient to keep a log of home readings to keep trends of blood pressure

## 2022-04-11 ENCOUNTER — OFFICE VISIT (OUTPATIENT)
Dept: FAMILY MEDICINE CLINIC | Facility: CLINIC | Age: 67
End: 2022-04-11
Payer: COMMERCIAL

## 2022-04-11 VITALS
TEMPERATURE: 96.8 F | WEIGHT: 193 LBS | DIASTOLIC BLOOD PRESSURE: 84 MMHG | BODY MASS INDEX: 30.29 KG/M2 | SYSTOLIC BLOOD PRESSURE: 138 MMHG | HEART RATE: 62 BPM | HEIGHT: 67 IN | OXYGEN SATURATION: 94 %

## 2022-04-11 DIAGNOSIS — M81.0 AGE-RELATED OSTEOPOROSIS WITHOUT CURRENT PATHOLOGICAL FRACTURE: ICD-10-CM

## 2022-04-11 DIAGNOSIS — I10 ESSENTIAL HYPERTENSION: Primary | ICD-10-CM

## 2022-04-11 DIAGNOSIS — H04.129 DRY EYE: ICD-10-CM

## 2022-04-11 DIAGNOSIS — R00.2 PALPITATIONS: ICD-10-CM

## 2022-04-11 DIAGNOSIS — Z12.31 BREAST CANCER SCREENING BY MAMMOGRAM: ICD-10-CM

## 2022-04-11 DIAGNOSIS — G25.0 ESSENTIAL TREMOR: ICD-10-CM

## 2022-04-11 DIAGNOSIS — E78.2 MIXED HYPERLIPIDEMIA: ICD-10-CM

## 2022-04-11 PROCEDURE — 99214 OFFICE O/P EST MOD 30 MIN: CPT | Performed by: FAMILY MEDICINE

## 2022-04-11 PROCEDURE — 3008F BODY MASS INDEX DOCD: CPT | Performed by: FAMILY MEDICINE

## 2022-04-11 PROCEDURE — 3075F SYST BP GE 130 - 139MM HG: CPT | Performed by: FAMILY MEDICINE

## 2022-04-11 PROCEDURE — 3079F DIAST BP 80-89 MM HG: CPT | Performed by: FAMILY MEDICINE

## 2022-04-11 PROCEDURE — 1036F TOBACCO NON-USER: CPT | Performed by: FAMILY MEDICINE

## 2022-04-11 RX ORDER — CYCLOSPORINE 0.5 MG/ML
1 EMULSION OPHTHALMIC AS NEEDED
Qty: 90 ML | Refills: 2 | Status: SHIPPED | OUTPATIENT
Start: 2022-04-11

## 2022-06-01 DIAGNOSIS — I10 ESSENTIAL HYPERTENSION: ICD-10-CM

## 2022-06-01 RX ORDER — NADOLOL 20 MG/1
TABLET ORAL
Qty: 135 TABLET | Refills: 1 | Status: SHIPPED | OUTPATIENT
Start: 2022-06-01

## 2022-07-29 ENCOUNTER — OFFICE VISIT (OUTPATIENT)
Dept: FAMILY MEDICINE CLINIC | Facility: CLINIC | Age: 67
End: 2022-07-29
Payer: COMMERCIAL

## 2022-07-29 VITALS
TEMPERATURE: 97 F | BODY MASS INDEX: 30.61 KG/M2 | DIASTOLIC BLOOD PRESSURE: 82 MMHG | SYSTOLIC BLOOD PRESSURE: 140 MMHG | RESPIRATION RATE: 16 BRPM | OXYGEN SATURATION: 98 % | HEIGHT: 67 IN | HEART RATE: 67 BPM | WEIGHT: 195 LBS

## 2022-07-29 DIAGNOSIS — H66.002 NON-RECURRENT ACUTE SUPPURATIVE OTITIS MEDIA OF LEFT EAR WITHOUT SPONTANEOUS RUPTURE OF TYMPANIC MEMBRANE: Primary | ICD-10-CM

## 2022-07-29 PROCEDURE — 3079F DIAST BP 80-89 MM HG: CPT | Performed by: FAMILY MEDICINE

## 2022-07-29 PROCEDURE — 3077F SYST BP >= 140 MM HG: CPT | Performed by: FAMILY MEDICINE

## 2022-07-29 PROCEDURE — 99213 OFFICE O/P EST LOW 20 MIN: CPT | Performed by: FAMILY MEDICINE

## 2022-07-29 RX ORDER — AMOXICILLIN AND CLAVULANATE POTASSIUM 875; 125 MG/1; MG/1
1 TABLET, FILM COATED ORAL EVERY 12 HOURS SCHEDULED
Qty: 14 TABLET | Refills: 0 | Status: SHIPPED | OUTPATIENT
Start: 2022-07-29 | End: 2022-08-05

## 2022-07-29 NOTE — PROGRESS NOTES
Assessment/Plan:    No problem-specific Assessment & Plan notes found for this encounter  Diagnoses and all orders for this visit:    Non-recurrent acute suppurative otitis media of left ear without spontaneous rupture of tympanic membrane  -     amoxicillin-clavulanate (Augmentin) 875-125 mg per tablet; Take 1 tablet by mouth every 12 (twelve) hours for 7 days      Augmentin for 7 days  Continue nasal sprays  Follow-up as needed    Subjective:      Patient ID: Rito Espino is a 61 y o  female  Eight days of sinus pressure, pain and bilateral ear pain  Seen in urgent care on Monday she tested negative for COVID  No antibiotics given  Currently using saline spray and Nasacort  Sinus symptoms have slightly improved but continues to have bilateral ear pain  The following portions of the patient's history were reviewed and updated as appropriate: allergies, current medications, past family history, past medical history, past social history, past surgical history and problem list     Review of Systems   Constitutional: Negative for fatigue and fever  HENT: Positive for ear pain, postnasal drip, rhinorrhea, sinus pressure, sinus pain and sore throat  Eyes: Negative for photophobia, pain and redness  Respiratory: Negative for shortness of breath  Cardiovascular: Negative for chest pain  Objective:      /82   Pulse 67   Temp (!) 97 °F (36 1 °C)   Resp 16   Ht 5' 7 1" (1 704 m)   Wt 88 5 kg (195 lb)   SpO2 98%   BMI 30 45 kg/m²          Physical Exam  Vitals and nursing note reviewed  Constitutional:       Appearance: Normal appearance  HENT:      Head: Normocephalic and atraumatic  Right Ear: Hearing and external ear normal  No drainage, swelling or tenderness  No middle ear effusion  Tympanic membrane is not erythematous or bulging  Left Ear: Hearing and external ear normal  Drainage present  Tympanic membrane is erythematous and bulging        Nose: Congestion and rhinorrhea present  Mouth/Throat:      Mouth: Mucous membranes are moist       Pharynx: No oropharyngeal exudate  Eyes:      Extraocular Movements: Extraocular movements intact  Pupils: Pupils are equal, round, and reactive to light  Neurological:      Mental Status: She is alert

## 2022-07-29 NOTE — PATIENT INSTRUCTIONS
Ear Infection   AMBULATORY CARE:   An ear infection  is also called otitis media  Blocked or swollen eustachian tubes can cause an infection  Eustachian tubes connect the middle ear to the back of the nose and throat  They drain fluid from the middle ear  You may have a buildup of fluid in your ear  Germs build up in the fluid and infection develops  Common signs and symptoms:   Ear pain    Fever or a headache    Trouble hearing    Ringing or buzzing in your ear    Plugged ear or an ear that feels full    Dizziness    Nausea or vomiting    Seek care immediately if:   You have clear fluid coming from your ear  You have a stiff neck, headache, and a fever  Call your doctor if:   You see blood or pus draining from your ear  Your ear pain gets worse or does not go away, even after treatment  The outside of your ear is red or swollen  You are vomiting or have diarrhea  You have questions or concerns about your condition or care  Medicines: You may  need any of the following:  Acetaminophen  decreases pain and fever  It is available without a doctor's order  Ask how much to take and how often to take it  Follow directions  Read the labels of all other medicines you are using to see if they also contain acetaminophen, or ask your doctor or pharmacist  Acetaminophen can cause liver damage if not taken correctly  Do not use more than 4 grams (4,000 milligrams) total of acetaminophen in one day  NSAIDs , such as ibuprofen, help decrease swelling, pain, and fever  This medicine is available with or without a doctor's order  NSAIDs can cause stomach bleeding or kidney problems in certain people  If you take blood thinner medicine, always ask your healthcare provider if NSAIDs are safe for you  Always read the medicine label and follow directions  Ear drops  may contain medicine to decrease pain and inflammation  Antibiotics  help treat a bacterial infection      Self-care:   Apply heat  on your ear for 15 to 20 minutes, 3 to 4 times a day or as directed  You can apply heat with an electric heating pad, hot water bottle, or warm compress  Always put a cloth between your skin and the heat pack to prevent burns  Heat helps decrease pain  Apply ice  on your ear for 15 to 20 minutes, 3 to 4 times a day for 2 days or as directed  Use an ice pack, or put crushed ice in a plastic bag  Cover it with a towel before you apply it to your ear  Ice decreases swelling and pain  Prevent an ear infection:   Wash your hands often  to help prevent the spread of germs  Ask everyone in your house to wash their hands with soap and water  Ask them to wash after they use the bathroom or change a diaper  Remind them to wash before they prepare or eat food  Stay away from people who are ill  Some germs spread easily and quickly through contact  Follow up with your doctor as directed:  Write down your questions so you remember to ask them during your visits  © Copyright [employer] 2022 Information is for End User's use only and may not be sold, redistributed or otherwise used for commercial purposes  All illustrations and images included in CareNotes® are the copyrighted property of [employer]  or [facility] Nathaniel Gabriel   The above information is an  only  It is not intended as medical advice for individual conditions or treatments  Talk to your doctor, nurse or pharmacist before following any medical regimen to see if it is safe and effective for you

## 2022-08-03 ENCOUNTER — TELEPHONE (OUTPATIENT)
Dept: FAMILY MEDICINE CLINIC | Facility: CLINIC | Age: 67
End: 2022-08-03

## 2022-08-03 NOTE — TELEPHONE ENCOUNTER
Patient saw Madalyn Rangel on 7/29/22 for ear pain  She is on Augmentin & has one more dose left  The ear pain has subsided but now her ears are clogged like she is underwater  Is there something she can take to relieve this?

## 2022-08-03 NOTE — TELEPHONE ENCOUNTER
These are very typical symptoms that can linger and are not concerning for worsening symptoms  Sound like eustachian tube dysfunction  This can last for 6 weeks or more, needs to be treated with Flonase

## 2022-08-18 DIAGNOSIS — Z12.31 BREAST CANCER SCREENING BY MAMMOGRAM: ICD-10-CM

## 2022-09-01 DIAGNOSIS — E78.2 MIXED HYPERLIPIDEMIA: ICD-10-CM

## 2022-09-01 RX ORDER — ATORVASTATIN CALCIUM 20 MG/1
20 TABLET, FILM COATED ORAL DAILY
Qty: 90 TABLET | Refills: 3 | Status: SHIPPED | OUTPATIENT
Start: 2022-09-01

## 2022-09-15 DIAGNOSIS — I10 ESSENTIAL HYPERTENSION: ICD-10-CM

## 2022-09-15 RX ORDER — LISINOPRIL 10 MG/1
10 TABLET ORAL DAILY
Qty: 90 TABLET | Refills: 1 | Status: SHIPPED | OUTPATIENT
Start: 2022-09-15

## 2022-10-11 LAB
BUN SERPL-MCNC: 20 MG/DL (ref 7–25)
BUN/CREAT SERPL: ABNORMAL (CALC) (ref 6–22)
CALCIUM SERPL-MCNC: 10 MG/DL (ref 8.6–10.4)
CHLORIDE SERPL-SCNC: 101 MMOL/L (ref 98–110)
CHOLEST SERPL-MCNC: 180 MG/DL
CHOLEST/HDLC SERPL: 4.5 (CALC)
CO2 SERPL-SCNC: 28 MMOL/L (ref 20–32)
CREAT SERPL-MCNC: 0.68 MG/DL (ref 0.5–1.05)
GFR/BSA.PRED SERPLBLD CYS-BASED-ARV: 97 ML/MIN/1.73M2
GLUCOSE SERPL-MCNC: 114 MG/DL (ref 65–99)
HDLC SERPL-MCNC: 40 MG/DL
LDLC SERPL CALC-MCNC: 110 MG/DL (CALC)
NONHDLC SERPL-MCNC: 140 MG/DL (CALC)
POTASSIUM SERPL-SCNC: 4.2 MMOL/L (ref 3.5–5.3)
SODIUM SERPL-SCNC: 138 MMOL/L (ref 135–146)
TRIGL SERPL-MCNC: 189 MG/DL

## 2022-10-17 NOTE — ASSESSMENT & PLAN NOTE
Osteoporosis diagnosed in June 2016 with T score of -2 6, started on Boniva 450mg monthly in January 2017  Repeat DEXA in 2018 showed improvement to osteopenia and repeat DEXA in July 2020 with T-score of -1 9 in the femur, other wise normal T score in lumbar spine, hip, forearm     She has completed 5 years of treatment, repeat DEXA pending

## 2022-10-17 NOTE — PROGRESS NOTES
FAMILY MEDICINE PROGRESS NOTE    Date of Service: 10/18/22  Primary Care Provider:   Frank Huertas MD       Name: Shanti Beckham       : 1955       Age:66 y o  Sex: female      MRN: [id number]      Chief Complaint:Medicare Wellness Visit       ASSESSMENT and PLAN:  Shanti Beckham is a 61 y o  female with:     Problem List Items Addressed This Visit        Cardiovascular and Mediastinum    Primary hypertension - Primary     BP Readings from Last 3 Encounters:   10/18/22 144/92   22 140/82   22 138/84     Lab Results   Component Value Date    CREATININE 0 68 10/11/2022    EGFR 97 10/11/2022     Borderline blood pressure today, will continue current management with nadolol 30 mg daily primarily for tremor and lisinopril 10 mg daily  Continue to monitor at home, notify if readings >140/>90 consistently             Nervous and Auditory    Essential tremor     Continue Nadolol 30 mg            Musculoskeletal and Integument    Age-related osteoporosis without current pathological fracture     Osteoporosis diagnosed in 2016 with T score of -2 6, started on Boniva 450mg monthly in 2017  Repeat DEXA in  showed improvement to osteopenia and repeat DEXA in 2020 with T-score of -1 9 in the femur, other wise normal T score in lumbar spine, hip, forearm     She has completed 5 years of treatment, repeat DEXA pending             Other    Mixed hyperlipidemia     Lab Results   Component Value Date    CHOLESTEROL 180 10/11/2022    HDL 40 (L) 10/11/2022    LDLCALC 110 (H) 10/11/2022    TRIG 189 (H) 10/11/2022     The 10-year ASCVD risk score (Tariq Stanford et al , 2013) is: 8 7%    Values used to calculate the score:      Age: 61 years      Sex: Female      Is Non- : No      Diabetic: No      Tobacco smoker: No      Systolic Blood Pressure: 419 mmHg      Is BP treated: Yes      HDL Cholesterol: 40 mg/dL      Total Cholesterol: 180 mg/dL    Controlled, continue Atorvastatin 20mg         Prediabetes     Lab Results   Component Value Date    HGBA1C 6 0 (H) 12/03/2021    HGBA1C 6 0 (H) 03/13/2020    HGBA1C 5 8 (H) 10/01/2019     Lab Results   Component Value Date    GLUF 120 (H) 12/03/2021    LDLCALC 110 (H) 10/11/2022    CREATININE 0 68 10/11/2022     Most recent fasting glucose of 114           Palpitations     Improved since stopping fish oil            Other Visit Diagnoses     Encounter for immunization        Relevant Orders    influenza vaccine, quadrivalent, recombinant, PF, 0 5 mL, for patients 18 yr+ (FLUBLOK) (Completed)    Welcome to Medicare preventive visit              SUBJECTIVE:  Madeleine Litten is a 61 y o  female who presents today with a chief complaint of Medicare Wellness Visit  HPI     Patient presents for AWV and follow-up  She just started on Medicare  Hypertension  She is on nadolol 30 mg (primarily for tremor) and lisinopril 10 mg daily  She does check her blood pressure at home, most around 138/84  She denies chest pain, shortness of breath, headaches, vision changes  Her palpitations have improved since stopping fish oil  She exercises about 5 days a week with walking and strength training  Review of Systems   Eyes: Negative for visual disturbance  Cardiovascular: Negative for chest pain, palpitations and leg swelling  Neurological: Negative for light-headedness and headaches  Psychiatric/Behavioral: Negative for dysphoric mood and sleep disturbance  The patient is not nervous/anxious  I have reviewed the patient's Past Medical History      Current Outpatient Medications:   •  atorvastatin (LIPITOR) 20 mg tablet, Take 1 tablet (20 mg total) by mouth daily, Disp: 90 tablet, Rfl: 3  •  Calcium Carbonate-Vitamin D 600-400 MG-UNIT per tablet, Take 1 tablet by mouth daily, Disp: , Rfl:   •  cetirizine (ZyrTEC) 5 MG chewable tablet, Chew 5 mg as needed, Disp: , Rfl:   •  cycloSPORINE (Restasis) 0 05 % ophthalmic emulsion, Administer 1 drop to both eyes as needed (dry eyes), Disp: 90 mL, Rfl: 2  •  lisinopril (ZESTRIL) 10 mg tablet, Take 1 tablet (10 mg total) by mouth daily, Disp: 90 tablet, Rfl: 1  •  Multiple Vitamins-Minerals (MULTIVITAMIN ADULT PO), Take 1 tablet by mouth daily, Disp: , Rfl:   •  nadolol (CORGARD) 20 mg tablet, TAKE 1 AND 1/2 TABLETS BY MOUTH AT BEDTIME, Disp: 135 tablet, Rfl: 1  •  sodium chloride (OCEAN) 0 65 % nasal spray, 1 spray into each nostril as needed for rhinitis for up to 30 days, Disp: 10 mL, Rfl: 0  •  valACYclovir (VALTREX) 1,000 mg tablet, Take 1 tablet (1,000 mg total) by mouth 2 (two) times a day for 14 days, Disp: 90 tablet, Rfl: 3    OBJECTIVE:  /92   Pulse 64   Temp 97 5 °F (36 4 °C)   Resp 17   Ht 5' 7" (1 702 m)   Wt 87 5 kg (193 lb)   SpO2 98%   BMI 30 23 kg/m²    BP Readings from Last 3 Encounters:   10/18/22 144/92   07/29/22 140/82   04/11/22 138/84      Wt Readings from Last 3 Encounters:   10/18/22 87 5 kg (193 lb)   07/29/22 88 5 kg (195 lb)   04/11/22 87 5 kg (193 lb)      Physical Exam  Constitutional:       General: She is not in acute distress  Appearance: Normal appearance  She is normal weight  She is not ill-appearing or toxic-appearing  HENT:      Head: Normocephalic and atraumatic  Right Ear: Tympanic membrane, ear canal and external ear normal       Left Ear: Tympanic membrane, ear canal and external ear normal       Nose: Nose normal       Mouth/Throat:      Mouth: Mucous membranes are moist    Eyes:      Extraocular Movements: Extraocular movements intact  Conjunctiva/sclera: Conjunctivae normal    Cardiovascular:      Rate and Rhythm: Normal rate and regular rhythm  Pulses: Normal pulses  Heart sounds: Normal heart sounds  No murmur heard  No friction rub  No gallop  Pulmonary:      Effort: Pulmonary effort is normal  No respiratory distress  Breath sounds: Normal breath sounds  No stridor  No wheezing, rhonchi or rales     Abdominal: General: There is no distension  Palpations: Abdomen is soft  Musculoskeletal:         General: Normal range of motion  Cervical back: Normal range of motion and neck supple  No rigidity  Right lower leg: No edema  Left lower leg: No edema  Skin:     General: Skin is warm and dry  Findings: No erythema or rash  Comments: Two 1 cm lipomas on upper thighs   Neurological:      General: No focal deficit present  Mental Status: She is alert and oriented to person, place, and time  Psychiatric:         Mood and Affect: Mood normal          Behavior: Behavior normal          Lab Results   Component Value Date    SODIUM 138 10/11/2022    K 4 2 10/11/2022     10/11/2022    CO2 28 10/11/2022    BUN 20 10/11/2022    CREATININE 0 68 10/11/2022    GLUC 114 (H) 10/11/2022    CALCIUM 10 0 10/11/2022     Lab Results   Component Value Date    CHOLESTEROL 180 10/11/2022    CHOLESTEROL 188 12/03/2021    CHOLESTEROL 146 11/24/2020     Lab Results   Component Value Date    HDL 40 (L) 10/11/2022    HDL 47 (L) 12/03/2021    HDL 37 (L) 11/24/2020     Lab Results   Component Value Date    TRIG 189 (H) 10/11/2022    TRIG 192 (H) 12/03/2021    TRIG 131 11/24/2020     No results found for: Isaac  Lab Results   Component Value Date    LDLCALC 110 (H) 10/11/2022              Return in about 6 months (around 4/18/2023) for follow-up   Dago Long MD    Note: Portions of the record have been created with voice recognition software  Occasional wrong word or "sound a like" substitutions may have occurred due to the inherent limitations of voice recognition software  Read the chart carefully and recognize, using context, where substitutions have occurred

## 2022-10-17 NOTE — ASSESSMENT & PLAN NOTE
Lab Results   Component Value Date    HGBA1C 6 0 (H) 12/03/2021    HGBA1C 6 0 (H) 03/13/2020    HGBA1C 5 8 (H) 10/01/2019     Lab Results   Component Value Date    GLUF 120 (H) 12/03/2021    LDLCALC 110 (H) 10/11/2022    CREATININE 0 68 10/11/2022     Most recent fasting glucose of 114

## 2022-10-17 NOTE — ASSESSMENT & PLAN NOTE
Lab Results   Component Value Date    CHOLESTEROL 180 10/11/2022    HDL 40 (L) 10/11/2022    LDLCALC 110 (H) 10/11/2022    TRIG 189 (H) 10/11/2022     The 10-year ASCVD risk score (Doyle Hubbard et al , 2013) is: 8 7%    Values used to calculate the score:      Age: 61 years      Sex: Female      Is Non- : No      Diabetic: No      Tobacco smoker: No      Systolic Blood Pressure: 815 mmHg      Is BP treated: Yes      HDL Cholesterol: 40 mg/dL      Total Cholesterol: 180 mg/dL    Controlled, continue Atorvastatin 20mg

## 2022-10-18 ENCOUNTER — OFFICE VISIT (OUTPATIENT)
Dept: FAMILY MEDICINE CLINIC | Facility: CLINIC | Age: 67
End: 2022-10-18
Payer: COMMERCIAL

## 2022-10-18 ENCOUNTER — TELEPHONE (OUTPATIENT)
Dept: FAMILY MEDICINE CLINIC | Facility: CLINIC | Age: 67
End: 2022-10-18

## 2022-10-18 VITALS
OXYGEN SATURATION: 98 % | HEART RATE: 64 BPM | DIASTOLIC BLOOD PRESSURE: 92 MMHG | SYSTOLIC BLOOD PRESSURE: 144 MMHG | TEMPERATURE: 97.5 F | HEIGHT: 67 IN | WEIGHT: 193 LBS | RESPIRATION RATE: 17 BRPM | BODY MASS INDEX: 30.29 KG/M2

## 2022-10-18 DIAGNOSIS — E78.2 MIXED HYPERLIPIDEMIA: ICD-10-CM

## 2022-10-18 DIAGNOSIS — R00.2 PALPITATIONS: ICD-10-CM

## 2022-10-18 DIAGNOSIS — M81.0 AGE-RELATED OSTEOPOROSIS WITHOUT CURRENT PATHOLOGICAL FRACTURE: ICD-10-CM

## 2022-10-18 DIAGNOSIS — R73.03 PREDIABETES: ICD-10-CM

## 2022-10-18 DIAGNOSIS — Z23 ENCOUNTER FOR IMMUNIZATION: ICD-10-CM

## 2022-10-18 DIAGNOSIS — Z00.00 WELCOME TO MEDICARE PREVENTIVE VISIT: ICD-10-CM

## 2022-10-18 DIAGNOSIS — G25.0 ESSENTIAL TREMOR: ICD-10-CM

## 2022-10-18 DIAGNOSIS — I10 PRIMARY HYPERTENSION: Primary | ICD-10-CM

## 2022-10-18 PROCEDURE — G0008 ADMIN INFLUENZA VIRUS VAC: HCPCS

## 2022-10-18 PROCEDURE — 99214 OFFICE O/P EST MOD 30 MIN: CPT | Performed by: FAMILY MEDICINE

## 2022-10-18 PROCEDURE — G0402 INITIAL PREVENTIVE EXAM: HCPCS | Performed by: FAMILY MEDICINE

## 2022-10-18 PROCEDURE — 90682 RIV4 VACC RECOMBINANT DNA IM: CPT

## 2022-10-18 NOTE — TELEPHONE ENCOUNTER
Left detailed message on patient voicemail and to contact the office with any questions or concerns

## 2022-10-18 NOTE — ASSESSMENT & PLAN NOTE
BP Readings from Last 3 Encounters:   10/18/22 144/92   07/29/22 140/82   04/11/22 138/84     Lab Results   Component Value Date    CREATININE 0 68 10/11/2022    EGFR 97 10/11/2022     Borderline blood pressure today, will continue current management with nadolol 30 mg daily primarily for tremor and lisinopril 10 mg daily  Continue to monitor at home, notify if readings >140/>90 consistently

## 2022-10-18 NOTE — PROGRESS NOTES
Assessment and Plan:     Problem List Items Addressed This Visit        Cardiovascular and Mediastinum    Primary hypertension - Primary     BP Readings from Last 3 Encounters:   10/18/22 144/92   07/29/22 140/82   04/11/22 138/84     Lab Results   Component Value Date    CREATININE 0 68 10/11/2022    EGFR 97 10/11/2022     Borderline blood pressure today, will continue current management with nadolol 30 mg daily primarily for tremor and lisinopril 10 mg daily  Continue to monitor at home, notify if readings >140/>90 consistently             Nervous and Auditory    Essential tremor     Continue Nadolol 30 mg            Musculoskeletal and Integument    Age-related osteoporosis without current pathological fracture     Osteoporosis diagnosed in June 2016 with T score of -2 6, started on Boniva 450mg monthly in January 2017  Repeat DEXA in 2018 showed improvement to osteopenia and repeat DEXA in July 2020 with T-score of -1 9 in the femur, other wise normal T score in lumbar spine, hip, forearm     She has completed 5 years of treatment, repeat DEXA pending             Other    Mixed hyperlipidemia     Lab Results   Component Value Date    CHOLESTEROL 180 10/11/2022    HDL 40 (L) 10/11/2022    LDLCALC 110 (H) 10/11/2022    TRIG 189 (H) 10/11/2022     The 10-year ASCVD risk score (Boy Phillips et al , 2013) is: 8 7%    Values used to calculate the score:      Age: 61 years      Sex: Female      Is Non- : No      Diabetic: No      Tobacco smoker: No      Systolic Blood Pressure: 726 mmHg      Is BP treated: Yes      HDL Cholesterol: 40 mg/dL      Total Cholesterol: 180 mg/dL    Controlled, continue Atorvastatin 20mg         Prediabetes     Lab Results   Component Value Date    HGBA1C 6 0 (H) 12/03/2021    HGBA1C 6 0 (H) 03/13/2020    HGBA1C 5 8 (H) 10/01/2019     Lab Results   Component Value Date    GLUF 120 (H) 12/03/2021    LDLCALC 110 (H) 10/11/2022    CREATININE 0 68 10/11/2022     Most recent fasting glucose of 114           Palpitations     Improved since stopping fish oil            Other Visit Diagnoses     Encounter for immunization        Relevant Orders    influenza vaccine, quadrivalent, recombinant, PF, 0 5 mL, for patients 18 yr+ (FLUBLOK)    Welcome to Medicare preventive visit               Preventive health issues were discussed with patient, and age appropriate screening tests were ordered as noted in patient's After Visit Summary  Personalized health advice and appropriate referrals for health education or preventive services given if needed, as noted in patient's After Visit Summary       History of Present Illness:     Patient presents for a Medicare Wellness Visit    HPI   Patient Care Team:  Krys Ahn MD as PCP - General (Family Medicine)     Review of Systems:     Review of Systems     Problem List:     Patient Active Problem List   Diagnosis   • Primary hypertension   • Age-related osteoporosis without current pathological fracture   • Essential tremor   • Mixed hyperlipidemia   • Prediabetes   • Class 1 obesity due to excess calories with serious comorbidity and body mass index (BMI) of 30 0 to 30 9 in adult   • Vitamin D deficiency   • Tinea pedis of both feet   • Palpitations      Past Medical and Surgical History:     Past Medical History:   Diagnosis Date   • Hypertension    • Lymphadenopathy     LAST ASSESSED [id number]     Past Surgical History:   Procedure Laterality Date   • REDUCTION MAMMAPLASTY     • TONSILLECTOMY        Family History:     Family History   Problem Relation Age of Onset   • Coronary artery disease Mother    • Diabetes Mother    • Hypertension Mother    • Osteoporosis Mother    • Diabetes Father    • Diabetes Sister    • Hypertension Sister    • Hypertension Brother       Social History:     Social History     Socioeconomic History   • Marital status: /Civil Union     Spouse name: None   • Number of children: None   • Years of education: None   • Highest education level: None   Occupational History   • None   Tobacco Use   • Smoking status: Never Smoker   • Smokeless tobacco: Never Used   Substance and Sexual Activity   • Alcohol use: Yes     Alcohol/week: 1 0 standard drink     Types: 1 Cans of beer per week     Comment: social   • Drug use: No   • Sexual activity: Yes     Partners: Male     Birth control/protection: None   Other Topics Concern   • None   Social History Narrative   • None     Social Determinants of Health     Financial Resource Strain: Low Risk    • Difficulty of Paying Living Expenses: Not hard at all   Food Insecurity: Not on file   Transportation Needs: No Transportation Needs   • Lack of Transportation (Medical): No   • Lack of Transportation (Non-Medical):  No   Physical Activity: Not on file   Stress: Not on file   Social Connections: Not on file   Intimate Partner Violence: Not on file   Housing Stability: Not on file      Medications and Allergies:     Current Outpatient Medications   Medication Sig Dispense Refill   • atorvastatin (LIPITOR) 20 mg tablet Take 1 tablet (20 mg total) by mouth daily 90 tablet 3   • Calcium Carbonate-Vitamin D 600-400 MG-UNIT per tablet Take 1 tablet by mouth daily     • cetirizine (ZyrTEC) 5 MG chewable tablet Chew 5 mg as needed     • cycloSPORINE (Restasis) 0 05 % ophthalmic emulsion Administer 1 drop to both eyes as needed (dry eyes) 90 mL 2   • lisinopril (ZESTRIL) 10 mg tablet Take 1 tablet (10 mg total) by mouth daily 90 tablet 1   • Multiple Vitamins-Minerals (MULTIVITAMIN ADULT PO) Take 1 tablet by mouth daily     • nadolol (CORGARD) 20 mg tablet TAKE 1 AND 1/2 TABLETS BY MOUTH AT BEDTIME 135 tablet 1   • sodium chloride (OCEAN) 0 65 % nasal spray 1 spray into each nostril as needed for rhinitis for up to 30 days 10 mL 0   • valACYclovir (VALTREX) 1,000 mg tablet Take 1 tablet (1,000 mg total) by mouth 2 (two) times a day for 14 days 90 tablet 3     No current facility-administered medications for this visit  No Known Allergies   Immunizations:     Immunization History   Administered Date(s) Administered   • COVID-19 MODERNA VACC 0 5 ML IM 01/11/2021, 02/08/2021, 10/28/2021   • COVID-19 PFIZER VACCINE 0 3 ML IM 07/13/2022   • DTaP 5 01/01/2012   • INFLUENZA 10/20/2010, 10/02/2021   • Influenza Injectable, MDCK, Preservative Free, Quadrivalent, 0 5 mL 09/16/2020   • Influenza Quadrivalent, 6-35 Months IM 11/22/2016, 11/21/2017   • Influenza, recombinant, quadrivalent,injectable, preservative free 11/20/2018, 10/10/2019   • Tdap 12/31/2020   • Zoster Vaccine Recombinant 02/03/2022      Health Maintenance:         Topic Date Due   • Hepatitis C Screening  Never done   • HIV Screening  Never done   • Breast Cancer Screening: Mammogram  08/11/2023   • Cervical Cancer Screening  11/03/2023   • Colorectal Cancer Screening  05/22/2028         Topic Date Due   • Influenza Vaccine (1) 09/01/2022      Medicare Screening Tests and Risk Assessments:     Jennifer Diamond is here for her Welcome to Medicare visit  Health Risk Assessment:   Patient rates overall health as very good  Patient feels that their physical health rating is slightly better  Patient is very satisfied with their life  Eyesight was rated as same  Hearing was rated as same  Patient feels that their emotional and mental health rating is same  Patients states they are never, rarely angry  Patient states they are never, rarely unusually tired/fatigued  Pain experienced in the last 7 days has been some  Patient's pain rating has been 3/10  Patient states that she has experienced no weight loss or gain in last 6 months  Depression Screening:   PHQ-2 Score: 0      Fall Risk Screening: In the past year, patient has experienced: no history of falling in past year      Urinary Incontinence Screening:   Patient has leaked urine accidently in the last six months   Very little    Home Safety:  Patient does not have trouble with stairs inside or outside of their home  Patient has working smoke alarms and has working carbon monoxide detector  Home safety hazards include: none  Nutrition:   Current diet is Regular  Medications:   Patient is currently taking over-the-counter supplements  OTC medications include: Vitamins  Patient is able to manage medications  Activities of Daily Living (ADLs)/Instrumental Activities of Daily Living (IADLs):   Walk and transfer into and out of bed and chair?: Yes  Dress and groom yourself?: Yes    Bathe or shower yourself?: Yes    Feed yourself? Yes  Do your laundry/housekeeping?: Yes  Manage your money, pay your bills and track your expenses?: Yes  Make your own meals?: Yes    Do your own shopping?: Yes    Previous Hospitalizations:   Any hospitalizations or ED visits within the last 12 months?: No      Advance Care Planning:   Living will: No    Durable POA for healthcare: No    Advanced directive: No    Advanced directive counseling given: Yes      Cognitive Screening:   Provider or family/friend/caregiver concerned regarding cognition?: No    PREVENTIVE SCREENINGS      Cardiovascular Screening:    General: Screening Not Indicated and History Lipid Disorder      Diabetes Screening:     General: Screening Current      Colorectal Cancer Screening:     General: Screening Current      Breast Cancer Screening:     General: Screening Current      Cervical Cancer Screening:    General: Screening Current      Osteoporosis Screening:    General: Screening Not Indicated and History Osteoporosis      Lung Cancer Screening:     General: Screening Not Indicated    Screening, Brief Intervention, and Referral to Treatment (SBIRT)    Screening  Typical number of drinks in a day: 0  Typical number of drinks in a week: 1  Interpretation: Low risk drinking behavior      AUDIT-C Screenin) How often did you have a drink containing alcohol in the past year? monthly or less  2) How many drinks did you have on a typical day when you were drinking in the past year? 1 to 2  3) How often did you have 6 or more drinks on one occasion in the past year? never    AUDIT-C Score: 1  Interpretation: Score 0-2 (female): Negative screen for alcohol misuse    Single Item Drug Screening:  How often have you used an illegal drug (including marijuana) or a prescription medication for non-medical reasons in the past year? never    Single Item Drug Screen Score: 0  Interpretation: Negative screen for possible drug use disorder    Brief Intervention  Alcohol & drug use screenings were reviewed  No concerns regarding substance use disorder identified  Other Counseling Topics:   Car/seat belt/driving safety and regular weightbearing exercise and calcium and vitamin D intake       No exam data present     Physical Exam:     /92   Pulse 64   Temp 97 5 °F (36 4 °C)   Resp 17   Ht 5' 7" (1 702 m)   Wt 87 5 kg (193 lb)   SpO2 98%   BMI 30 23 kg/m²     Physical Exam     Dago Long MD

## 2022-10-18 NOTE — PATIENT INSTRUCTIONS
Medicare Preventive Visit Patient Instructions  Thank you for completing your Welcome to Medicare Visit or Medicare Annual Wellness Visit today  Your next wellness visit will be due in one year (10/19/2023)  The screening/preventive services that you may require over the next 5-10 years are detailed below  Some tests may not apply to you based off risk factors and/or age  Screening tests ordered at today's visit but not completed yet may show as past due  Also, please note that scanned in results may not display below  Preventive Screenings:  Service Recommendations Previous Testing/Comments   Colorectal Cancer Screening  * Colonoscopy    * Fecal Occult Blood Test (FOBT)/Fecal Immunochemical Test (FIT)  * Fecal DNA/Cologuard Test  * Flexible Sigmoidoscopy Age: 39-70 years old   Colonoscopy: every 10 years (may be performed more frequently if at higher risk)  OR  FOBT/FIT: every 1 year  OR  Cologuard: every 3 years  OR  Sigmoidoscopy: every 5 years  Screening may be recommended earlier than age 39 if at higher risk for colorectal cancer  Also, an individualized decision between you and your healthcare provider will decide whether screening between the ages of 74-80 would be appropriate  Colonoscopy: 05/22/2018  FOBT/FIT: Not on file  Cologuard: Not on file  Sigmoidoscopy: Not on file    Screening Current     Breast Cancer Screening Age: 36 years old  Frequency: every 1-2 years  Not required if history of left and right mastectomy Mammogram: 08/11/2022    Screening Current   Cervical Cancer Screening Between the ages of 21-29, pap smear recommended once every 3 years  Between the ages of 33-67, can perform pap smear with HPV co-testing every 5 years     Recommendations may differ for women with a history of total hysterectomy, cervical cancer, or abnormal pap smears in past  Pap Smear: 11/03/2020    Screening Current   Hepatitis C Screening Once for adults born between 1945 and 1965  More frequently in patients at high risk for Hepatitis C Hep C Antibody: Not on file        Diabetes Screening 1-2 times per year if you're at risk for diabetes or have pre-diabetes Fasting glucose: 120 mg/dL (12/3/2021)  A1C: 6 0 % (12/3/2021)  Screening Current   Cholesterol Screening Once every 5 years if you don't have a lipid disorder  May order more often based on risk factors  Lipid panel: 10/11/2022    Screening Not Indicated  History Lipid Disorder     Other Preventive Screenings Covered by Medicare:  1  Abdominal Aortic Aneurysm (AAA) Screening: covered once if your at risk  You're considered to be at risk if you have a family history of AAA  2  Lung Cancer Screening: covers low dose CT scan once per year if you meet all of the following conditions: (1) Age 50-69; (2) No signs or symptoms of lung cancer; (3) Current smoker or have quit smoking within the last 15 years; (4) You have a tobacco smoking history of at least 20 pack years (packs per day multiplied by number of years you smoked); (5) You get a written order from a healthcare provider  3  Glaucoma Screening: covered annually if you're considered high risk: (1) You have diabetes OR (2) Family history of glaucoma OR (3)  aged 48 and older OR (3)  American aged 72 and older  3  Osteoporosis Screening: covered every 2 years if you meet one of the following conditions: (1) You're estrogen deficient and at risk for osteoporosis based off medical history and other findings; (2) Have a vertebral abnormality; (3) On glucocorticoid therapy for more than 3 months; (4) Have primary hyperparathyroidism; (5) On osteoporosis medications and need to assess response to drug therapy  · Last bone density test (DXA Scan): 07/24/2020   5  HIV Screening: covered annually if you're between the age of 15-65  Also covered annually if you are younger than 13 and older than 72 with risk factors for HIV infection   For pregnant patients, it is covered up to 3 times per pregnancy  Immunizations:  Immunization Recommendations   Influenza Vaccine Annual influenza vaccination during flu season is recommended for all persons aged >= 6 months who do not have contraindications   Pneumococcal Vaccine   * Pneumococcal conjugate vaccine = PCV13 (Prevnar 13), PCV15 (Vaxneuvance), PCV20 (Prevnar 20)  * Pneumococcal polysaccharide vaccine = PPSV23 (Pneumovax) Adults 25-60 years old: 1-3 doses may be recommended based on certain risk factors  Adults 72 years old: 1-2 doses may be recommended based off what pneumonia vaccine you previously received   Hepatitis B Vaccine 3 dose series if at intermediate or high risk (ex: diabetes, end stage renal disease, liver disease)   Tetanus (Td) Vaccine - COST NOT COVERED BY MEDICARE PART B Following completion of primary series, a booster dose should be given every 10 years to maintain immunity against tetanus  Td may also be given as tetanus wound prophylaxis  Tdap Vaccine - COST NOT COVERED BY MEDICARE PART B Recommended at least once for all adults  For pregnant patients, recommended with each pregnancy  Shingles Vaccine (Shingrix) - COST NOT COVERED BY MEDICARE PART B  2 shot series recommended in those aged 48 and above     Health Maintenance Due:      Topic Date Due   • Hepatitis C Screening  Never done   • HIV Screening  Never done   • Breast Cancer Screening: Mammogram  08/11/2023   • Cervical Cancer Screening  11/03/2023   • Colorectal Cancer Screening  05/22/2028     Immunizations Due:      Topic Date Due   • Influenza Vaccine (1) 09/01/2022     Advance Directives   What are advance directives? Advance directives are legal documents that state your wishes and plans for medical care  These plans are made ahead of time in case you lose your ability to make decisions for yourself  Advance directives can apply to any medical decision, such as the treatments you want, and if you want to donate organs     What are the types of advance directives? There are many types of advance directives, and each state has rules about how to use them  You may choose a combination of any of the following:  · Living will: This is a written record of the treatment you want  You can also choose which treatments you do not want, which to limit, and which to stop at a certain time  This includes surgery, medicine, IV fluid, and tube feedings  · Durable power of  for healthcare [facility]): This is a written record that states who you want to make healthcare choices for you when you are unable to make them for yourself  This person, called a proxy, is usually a family member or a friend  You may choose more than 1 proxy  · Do not resuscitate (DNR) order:  A DNR order is used in case your heart stops beating or you stop breathing  It is a request not to have certain forms of treatment, such as CPR  A DNR order may be included in other types of advance directives  · Medical directive: This covers the care that you want if you are in a coma, near death, or unable to make decisions for yourself  You can list the treatments you want for each condition  Treatment may include pain medicine, surgery, blood transfusions, dialysis, IV or tube feedings, and a ventilator (breathing machine)  · Values history: This document has questions about your views, beliefs, and how you feel and think about life  This information can help others choose the care that you would choose  Why are advance directives important? An advance directive helps you control your care  Although spoken wishes may be used, it is better to have your wishes written down  Spoken wishes can be misunderstood, or not followed  Treatments may be given even if you do not want them  An advance directive may make it easier for your family to make difficult choices about your care  Urinary Incontinence   Urinary incontinence (UI)  is when you lose control of your bladder   UI develops because your bladder cannot store or empty urine properly  The 3 most common types of UI are stress incontinence, urge incontinence, or both  Medicines:   · May be given to help strengthen your bladder control  Report any side effects of medication to your healthcare provider  Do pelvic muscle exercises often:  Your pelvic muscles help you stop urinating  Squeeze these muscles tight for 5 seconds, then relax for 5 seconds  Gradually work up to squeezing for 10 seconds  Do 3 sets of 15 repetitions a day, or as directed  This will help strengthen your pelvic muscles and improve bladder control  Train your bladder:  Go to the bathroom at set times, such as every 2 hours, even if you do not feel the urge to go  You can also try to hold your urine when you feel the urge to go  For example, hold your urine for 5 minutes when you feel the urge to go  As that becomes easier, hold your urine for 10 minutes  Self-care:   · Keep a UI record  Write down how often you leak urine and how much you leak  Make a note of what you were doing when you leaked urine  · Drink liquids as directed  You may need to limit the amount of liquid you drink to help control your urine leakage  Do not drink any liquid right before you go to bed  Limit or do not have drinks that contain caffeine or alcohol  · Prevent constipation  Eat a variety of high-fiber foods  Good examples are high-fiber cereals, beans, vegetables, and whole-grain breads  Walking is the best way to trigger your intestines to have a bowel movement  · Exercise regularly and maintain a healthy weight  Weight loss and exercise will decrease pressure on your bladder and help you control your leakage  · Use a catheter as directed  to help empty your bladder  A catheter is a tiny, plastic tube that is put into your bladder to drain your urine  · Go to behavior therapy as directed  Behavior therapy may be used to help you learn to control your urge to urinate      Weight Management Why it is important to manage your weight:  Being overweight increases your risk of health conditions such as heart disease, high blood pressure, type 2 diabetes, and certain types of cancer  It can also increase your risk for osteoarthritis, sleep apnea, and other respiratory problems  Aim for a slow, steady weight loss  Even a small amount of weight loss can lower your risk of health problems  How to lose weight safely:  A safe and healthy way to lose weight is to eat fewer calories and get regular exercise  You can lose up about 1 pound a week by decreasing the number of calories you eat by 500 calories each day  Healthy meal plan for weight management:  A healthy meal plan includes a variety of foods, contains fewer calories, and helps you stay healthy  A healthy meal plan includes the following:  · Eat whole-grain foods more often  A healthy meal plan should contain fiber  Fiber is the part of grains, fruits, and vegetables that is not broken down by your body  Whole-grain foods are healthy and provide extra fiber in your diet  Some examples of whole-grain foods are whole-wheat breads and pastas, oatmeal, brown rice, and bulgur  · Eat a variety of vegetables every day  Include dark, leafy greens such as spinach, kale, roosevelt greens, and mustard greens  Eat yellow and orange vegetables such as carrots, sweet potatoes, and winter squash  · Eat a variety of fruits every day  Choose fresh or canned fruit (canned in its own juice or light syrup) instead of juice  Fruit juice has very little or no fiber  · Eat low-fat dairy foods  Drink fat-free (skim) milk or 1% milk  Eat fat-free yogurt and low-fat cottage cheese  Try low-fat cheeses such as mozzarella and other reduced-fat cheeses  · Choose meat and other protein foods that are low in fat  Choose beans or other legumes such as split peas or lentils  Choose fish, skinless poultry (chicken or turkey), or lean cuts of red meat (beef or pork)   Before you cook meat or poultry, cut off any visible fat  · Use less fat and oil  Try baking foods instead of frying them  Add less fat, such as margarine, sour cream, regular salad dressing and mayonnaise to foods  Eat fewer high-fat foods  Some examples of high-fat foods include french fries, doughnuts, ice cream, and cakes  · Eat fewer sweets  Limit foods and drinks that are high in sugar  This includes candy, cookies, regular soda, and sweetened drinks  Exercise:  Exercise at least 30 minutes per day on most days of the week  Some examples of exercise include walking, biking, dancing, and swimming  You can also fit in more physical activity by taking the stairs instead of the elevator or parking farther away from stores  Ask your healthcare provider about the best exercise plan for you  © Copyright [employer] 2018 Information is for End User's use only and may not be sold, redistributed or otherwise used for commercial purposes   All illustrations and images included in CareNotes® are the copyrighted property of A D A M , Inc  or [address] [place] [address]

## 2022-11-23 DIAGNOSIS — I10 ESSENTIAL HYPERTENSION: ICD-10-CM

## 2022-11-23 RX ORDER — NADOLOL 20 MG/1
30 TABLET ORAL
Qty: 135 TABLET | Refills: 1 | Status: SHIPPED | OUTPATIENT
Start: 2022-11-23

## 2022-11-23 RX ORDER — NADOLOL 20 MG/1
TABLET ORAL
Qty: 135 TABLET | Refills: 1 | OUTPATIENT
Start: 2022-11-23

## 2022-12-12 ENCOUNTER — OFFICE VISIT (OUTPATIENT)
Dept: FAMILY MEDICINE CLINIC | Facility: CLINIC | Age: 67
End: 2022-12-12

## 2022-12-12 VITALS
HEIGHT: 67 IN | OXYGEN SATURATION: 98 % | DIASTOLIC BLOOD PRESSURE: 78 MMHG | SYSTOLIC BLOOD PRESSURE: 122 MMHG | HEART RATE: 68 BPM | RESPIRATION RATE: 17 BRPM | BODY MASS INDEX: 31.16 KG/M2 | TEMPERATURE: 98.7 F | WEIGHT: 198.5 LBS

## 2022-12-12 DIAGNOSIS — M54.42 ACUTE BILATERAL LOW BACK PAIN WITH BILATERAL SCIATICA: ICD-10-CM

## 2022-12-12 DIAGNOSIS — R05.1 ACUTE COUGH: Primary | ICD-10-CM

## 2022-12-12 DIAGNOSIS — I10 PRIMARY HYPERTENSION: ICD-10-CM

## 2022-12-12 DIAGNOSIS — M54.41 ACUTE BILATERAL LOW BACK PAIN WITH BILATERAL SCIATICA: ICD-10-CM

## 2022-12-12 RX ORDER — METHYLPREDNISOLONE 4 MG/1
TABLET ORAL
Qty: 21 EACH | Refills: 0 | Status: SHIPPED | OUTPATIENT
Start: 2022-12-12

## 2022-12-12 RX ORDER — FLUTICASONE PROPIONATE 50 MCG
2 SPRAY, SUSPENSION (ML) NASAL DAILY
Qty: 9.9 G | Refills: 0 | Status: SHIPPED | OUTPATIENT
Start: 2022-12-12

## 2022-12-12 RX ORDER — BENZONATATE 100 MG/1
100 CAPSULE ORAL 3 TIMES DAILY PRN
Qty: 20 CAPSULE | Refills: 0 | Status: SHIPPED | OUTPATIENT
Start: 2022-12-12

## 2022-12-12 NOTE — PROGRESS NOTES
Name: Pal Bocanegra      : 1955      MRN: [id number]  Encounter Provider: Bud Ha MD  Encounter Date: 2022   Encounter department: [facility]     1  Acute cough  -     benzonatate (TESSALON PERLES) 100 mg capsule; Take 1 capsule (100 mg total) by mouth 3 (three) times a day as needed for cough  -     fluticasone (FLONASE) 50 mcg/act nasal spray; 2 sprays into each nostril daily    2  Acute bilateral low back pain with bilateral sciatica  -     methylPREDNISolone 4 MG tablet therapy pack; Use as directed on package    3  Primary hypertension  Assessment & Plan:  Continue current medications  Patient has been on lisinopril for 6 years without increase in dose recently  Low suspicion that cough could be secondary to lisinopril  If symptoms do not resolve and progress more than 8 weeks  We will consider discontinuing lisinopril and switching to another medication to see if there is resolution of cough  Orders above  Follow-up in office as needed  Will keep us updated on symptoms  If no resolution of cough she will follow-up in office  We discussed the etiology of acute versus chronic cough  Patient understands and agrees with our plan       Subjective      Cough since michael  Was taking mucinex for 2 weeks  Clear phlegm but mainly a dry cough  Tea with honey is helping  Denies any other symptoms  He is also reporting new onset bilateral back pain which goes down both legs  She has been watching her granddaughter more recently  She has been taking ibuprofen daily for this back pain  Does not seem to make any difference as of yet  She does have 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  Her last x-ray was in 2016    We did discuss ibuprofen could possibly be contributing to cough by exacerbating GERD symptoms    Review of Systems   Constitutional: Negative for activity change, appetite change, chills, diaphoresis, fatigue and fever    HENT: Negative for congestion  Respiratory: Positive for cough  Negative for shortness of breath, wheezing and stridor  Cardiovascular: Negative for chest pain  Current Outpatient Medications on File Prior to Visit   Medication Sig   • atorvastatin (LIPITOR) 20 mg tablet Take 1 tablet (20 mg total) by mouth daily   • Calcium Carbonate-Vitamin D 600-400 MG-UNIT per tablet Take 1 tablet by mouth daily   • cetirizine (ZyrTEC) 5 MG chewable tablet Chew 5 mg as needed   • cycloSPORINE (Restasis) 0 05 % ophthalmic emulsion Administer 1 drop to both eyes as needed (dry eyes)   • lisinopril (ZESTRIL) 10 mg tablet Take 1 tablet (10 mg total) by mouth daily   • Multiple Vitamins-Minerals (MULTIVITAMIN ADULT PO) Take 1 tablet by mouth daily   • nadolol (CORGARD) 20 mg tablet Take 1 5 tablets (30 mg total) by mouth daily at bedtime   • sodium chloride (OCEAN) 0 65 % nasal spray 1 spray into each nostril as needed for rhinitis for up to 30 days   • valACYclovir (VALTREX) 1,000 mg tablet Take 1 tablet (1,000 mg total) by mouth 2 (two) times a day for 14 days       Objective     /78 (BP Location: Left arm, Patient Position: Sitting, Cuff Size: Standard)   Pulse 68   Temp 98 7 °F (37 1 °C)   Resp 17   Ht 5' 7" (1 702 m)   Wt 90 kg (198 lb 8 oz)   SpO2 98%   BMI 31 09 kg/m²     Physical Exam  Vitals and nursing note reviewed  HENT:      Head: Normocephalic and atraumatic  Cardiovascular:      Rate and Rhythm: Normal rate and regular rhythm  Pulmonary:      Effort: Pulmonary effort is normal  No respiratory distress  Breath sounds: Normal breath sounds  No stridor  Abdominal:      General: Abdomen is flat  There is no distension  Tenderness: There is no abdominal tenderness  Musculoskeletal:         General: Tenderness present  Skin:     General: Skin is warm and dry         Akin Wild MD

## 2022-12-12 NOTE — ASSESSMENT & PLAN NOTE
Continue current medications  Patient has been on lisinopril for 6 years without increase in dose recently  Low suspicion that cough could be secondary to lisinopril  If symptoms do not resolve and progress more than 8 weeks  We will consider discontinuing lisinopril and switching to another medication to see if there is resolution of cough

## 2022-12-12 NOTE — PATIENT INSTRUCTIONS
Chronic Cough   AMBULATORY CARE:   A chronic cough  is a cough that lasts more than 4 weeks in children or 8 weeks in adults  Signs and symptoms you may also have: Wheezing and shortness of breath    A runny or stuffy nose    Pain or itching in your throat    Red, swollen, watery eyes    A raspy or hoarse voice    Heartburn or a sour taste in your mouth    Call your local emergency number (911 in the [address] [facility]) for any of the following: You cough up blood  You faint when you cough  You have trouble breathing  Call your doctor if:   You have new or worsening symptoms  You have severe pain when you take a deep breath  You become very tired after a coughing fit  You have trouble sleeping because of the coughing  You have questions or concerns about your condition or care  Treatment for a chronic cough  may depend on the cause  You may need medicine to stop your cough, treat allergies or acid reflux, or decrease swelling in your airways  You will need antibiotics if your cough is caused by a respiratory infection  If you take medicine that causes a chronic cough, it may be stopped or changed  You may need speech therapy  A speech therapist can teach you ways to control your cough  Self-care:   Prevent acid reflex  Acid reflux can make your chronic cough worse  Raise your head and upper back when you sleep  Place 2 or more pillows behind your head or sleep in a recliner  Do not lie down for at least 1 hour after you eat  Do not have foods or drinks that increase heartburn  Ask your healthcare provider for other ways to prevent acid reflux  Do not smoke  Encourage your adolescent child not to smoke  Nicotine and other chemicals in cigarettes and cigars can cause lung damage  They can also make your cough worse  Ask your healthcare provider for information if you currently smoke and need help to quit  E-cigarettes or smokeless tobacco still contain nicotine   Talk to your healthcare provider before you use these products  Stay away from secondhand smoke  Do not let people smoke in your car, home, or near your child  Do not stand near someone that is smoking  This includes anyone that is smoking an E-cigarrete  Avoid anything that triggers your allergies or irritates your throat  Allergens and irritants can make your chronic cough worse  Allergens may include dust mites, pollen, pet dander, or mold  Wear a mask if you work around pollutants or irritants  Ask your healthcare provider for more ways to decrease your exposure to allergens or irritants  Drink plenty of liquids as directed  Liquids may help relieve throat discomfort that causes you to cough  Add honey to tea or hot water to help ease your throat pain  Ask how much liquid to drink each day and which liquids are best for you  Follow up with your healthcare provider as directed: You may need to return for more tests  Your healthcare provider may refer to you other specialists  Write down your questions so you remember to ask them during your visits  © Copyright [employer] 2022 Information is for End User's use only and may not be sold, redistributed or otherwise used for commercial purposes  All illustrations and images included in CareNotes® are the copyrighted property of A D A M , Inc  or [facility] "[employer] [facility]  The above information is an  only  It is not intended as medical advice for individual conditions or treatments  Talk to your doctor, nurse or pharmacist before following any medical regimen to see if it is safe and effective for you  Acute Cough   WHAT YOU NEED TO KNOW:   What is an acute cough? An acute cough can last up to 3 weeks  Common causes of an acute cough include a cold, allergies, or a lung infection  How is the cause of an acute cough diagnosed? Your healthcare provider will examine you and listen to your lungs   Tell your healthcare provider if you cough up any mucus, or have a fever or shortness of breath  Also tell your provider what makes the cough better or worse  Depending on your symptoms, you may need a chest x-ray  A sample of mucus may be collected and tested for infection  How is an acute cough treated? An acute cough usually goes away on its own  Ask your healthcare provider about medicines you can take to decrease your cough  You may need medicine to stop the cough, decrease swelling in your airways, or help open your airways  Medicine may also be given to help you cough up mucus  If you have an infection caused by bacteria, you may need antibiotics  What can I do to manage my cough? Do not smoke and stay away from others who smoke  Nicotine and other chemicals in cigarettes and cigars can cause lung damage and make your cough worse  Ask your healthcare provider for information if you currently smoke and need help to quit  E-cigarettes or smokeless tobacco still contain nicotine  Talk to your healthcare provider before you use these products  Drink extra liquids as directed  Liquids will help thin and loosen mucus so you can cough it up  Liquids will also help prevent dehydration  Examples of good liquids to drink include water, fruit juice, and broth  Do not drink liquids that contain caffeine  Caffeine can increase your risk for dehydration  Ask your healthcare provider how much liquid to drink each day  Rest as directed  Do not do activities that make your cough worse, such as exercise  Use a humidifier or vaporizer  Use a cool mist humidifier or a vaporizer to increase air moisture in your home  This may make it easier for you to breathe and help decrease your cough  Eat 2 to 5 mL of honey 2 times each day  Honey can help thin mucus and decrease your cough  Use cough drops or lozenges  These can help decrease throat irritation and your cough  When should I seek immediate care? You have trouble breathing or feel short of breath      You cough up blood, or you see blood in your mucus  You faint or feel weak or dizzy  You have chest pain when you cough or take a deep breath  You have new wheezing  When should I contact my healthcare provider? You have a fever  Your cough lasts longer than 4 weeks  Your symptoms do not improve with treatment  You have questions or concerns about your condition or care  CARE AGREEMENT:   You have the right to help plan your care  Learn about your health condition and how it may be treated  Discuss treatment options with your healthcare providers to decide what care you want to receive  You always have the right to refuse treatment  The above information is an  only  It is not intended as medical advice for individual conditions or treatments  Talk to your doctor, nurse or pharmacist before following any medical regimen to see if it is safe and effective for you  © Copyright [address] 2022 Information is for End User's use only and may not be sold, redistributed or otherwise used for commercial purposes   All illustrations and images included in CareNotes® are the copyrighted property of A [employer] A M , Inc  or [address] [place] [address]

## 2022-12-28 ENCOUNTER — APPOINTMENT (OUTPATIENT)
Dept: RADIOLOGY | Facility: MEDICAL CENTER | Age: 67
End: 2022-12-28

## 2022-12-28 ENCOUNTER — OFFICE VISIT (OUTPATIENT)
Dept: URGENT CARE | Facility: MEDICAL CENTER | Age: 67
End: 2022-12-28

## 2022-12-28 ENCOUNTER — TELEPHONE (OUTPATIENT)
Dept: URGENT CARE | Facility: MEDICAL CENTER | Age: 67
End: 2022-12-28

## 2022-12-28 VITALS
DIASTOLIC BLOOD PRESSURE: 84 MMHG | HEART RATE: 81 BPM | SYSTOLIC BLOOD PRESSURE: 140 MMHG | TEMPERATURE: 97.6 F | WEIGHT: 197.2 LBS | RESPIRATION RATE: 18 BRPM | OXYGEN SATURATION: 96 % | BODY MASS INDEX: 30.95 KG/M2 | HEIGHT: 67 IN

## 2022-12-28 DIAGNOSIS — B35.6 TINEA CRURIS: ICD-10-CM

## 2022-12-28 DIAGNOSIS — M25.561 RIGHT MEDIAL KNEE PAIN: ICD-10-CM

## 2022-12-28 DIAGNOSIS — M25.561 RIGHT MEDIAL KNEE PAIN: Primary | ICD-10-CM

## 2022-12-28 RX ORDER — CLOTRIMAZOLE AND BETAMETHASONE DIPROPIONATE 10; .64 MG/G; MG/G
CREAM TOPICAL 2 TIMES DAILY
Qty: 30 G | Refills: 0 | Status: SHIPPED | OUTPATIENT
Start: 2022-12-28

## 2022-12-28 NOTE — PATIENT INSTRUCTIONS
Skin Yeast Infection   WHAT YOU NEED TO KNOW:   Yeast is normally present on the skin  Infection happens when you have too much yeast, or when it gets into a cut on your skin  Certain types of mold and fungus can cause a yeast infection  A skin yeast infection can appear anywhere on your skin or nail beds  Skin yeast infections are usually found on warm, moist parts of the body  Examples include between skin folds or under the breasts  DISCHARGE INSTRUCTIONS:   Return to the emergency department if:   You have signs of infection, such as pus, warmth or red streaks coming from the wound, or a fever  Call your doctor if:   Your symptoms worsen or do not get better within 7 to 10 days  You have new or returning signs of a skin yeast infection after treatment  You have questions or concerns about your condition or care  Medicines:   Antifungal medicine  may be given as a cream, ointment, or pill  Take your medicine as directed  Contact your healthcare provider if you think your medicine is not helping or if you have side effects  Tell him or her if you are allergic to any medicine  Keep a list of the medicines, vitamins, and herbs you take  Include the amounts, and when and why you take them  Bring the list or the pill bottles to follow-up visits  Carry your medicine list with you in case of an emergency  Care for the skin near the infection:  You may only have discolored patches of skin, or areas that are dry and flaking  Care for these skin problems as directed by your healthcare provider  If you have painful skin or an open sore, you will need to protect the skin and prevent damage  You will also need to keep the skin dry as much as possible  Ask your healthcare provider how to care for your skin while the infection clears  The following are general guidelines for caring for painful or open skin:  Keep the skin clean  Ask your healthcare provider if you should wash with mild soap and water   Do not use soap that contains alcohol  Alcohol can dry and irritate the skin and make symptoms worse  Your baby's healthcare provider may tell you to use diaper cream or ointment when you change his or her diaper  This will protect the skin and prevent moisture from collecting  Keep the skin dry  Pat the area dry with a towel  Do not rub, because this may irritate the skin  If you have a skin yeast infection between skin folds, lift the top part gently and hold it while you dry between your skin folds  Always dry your feet completely after you swim or bathe, including between your toes  Dry your skin if you are sweating from exercise or exposure to heat  Use a clean towel each time to prevent spreading or continuing the infection  Keep the skin protected  Ask your healthcare provider if you should cover the area with a bandage or leave it open  Check your skin each day to make sure you do not have new or worsening problems  You may need to have someone check the skin if you cannot see the area easily  Prevent another skin yeast infection:   Do not share clothing or towels    Wear shower shoes if you need to use a public shower    Dry your feet completely after you bathe, and apply antifungal powder or cream as directed    Put on socks before you get dressed so you do not spread fungus from your feet    Wear light clothing that allows air to get to your skin    Manage your weight to prevent skin folds where yeast can collect    Manage diabetes    Use antibiotics correctly to prevent antibiotic resistance    Change your baby's diaper often, and keep the area clean and dry as much as possible    Use a diaper cream or ointment that contains zinc oxide or dimethicone on your baby's diaper area as directed    Follow up with your doctor as directed:  Write down your questions so you remember to ask them during your visits    © Copyright [address] 2022 Information is for End User's use only and may not be sold, redistributed or otherwise used for commercial purposes  All illustrations and images included in CareNotes® are the copyrighted property of A D [employer] M [employer]  or Joseph Eller  The above information is an  only  It is not intended as medical advice for individual conditions or treatments  Talk to your doctor, nurse or pharmacist before following any medical regimen to see if it is safe and effective for you  Knee Pain   WHAT YOU NEED TO KNOW:   Knee pain may start suddenly, or it may be a long-term problem  You may have pain on the side, front, or back of your knee  You may have knee stiffness and swelling  You may hear popping sounds or feel like your knee is giving way or locking up as you walk  You may feel pain when you sit, stand, walk, or climb up and down stairs  Knee pain can be caused by conditions such as obesity, inflammation, or strains or tears in ligaments or tendons  DISCHARGE INSTRUCTIONS:   Return to the emergency department if:   Your pain is worse, even after treatment  You cannot bend or straighten your leg completely  The swelling around your knee does not go down even with treatment  Your knee is painful and hot to the touch  Contact your healthcare provider if:   You have questions or concerns about your condition or care  Medicines: You may need any of the following:  NSAIDs  help decrease swelling and pain or fever  This medicine is available with or without a doctor's order  NSAIDs can cause stomach bleeding or kidney problems in certain people  If you take blood thinner medicine, always ask your healthcare provider if NSAIDs are safe for you  Always read the medicine label and follow directions  Acetaminophen  decreases pain and fever  It is available without a doctor's order  Ask how much to take and how often to take it  Follow directions   Read the labels of all other medicines you are using to see if they also contain acetaminophen, or ask your doctor or pharmacist  Acetaminophen can cause liver damage if not taken correctly  Do not use more than 4 grams (4,000 milligrams) total of acetaminophen in one day  Prescription pain medicine  may be given  Ask your healthcare provider how to take this medicine safely  Some prescription pain medicines contain acetaminophen  Do not take other medicines that contain acetaminophen without talking to your healthcare provider  Too much acetaminophen may cause liver damage  Prescription pain medicine may cause constipation  Ask your healthcare provider how to prevent or treat constipation  Take your medicine as directed  Contact your healthcare provider if you think your medicine is not helping or if you have side effects  Tell him or her if you are allergic to any medicine  Keep a list of the medicines, vitamins, and herbs you take  Include the amounts, and when and why you take them  Bring the list or the pill bottles to follow-up visits  Carry your medicine list with you in case of an emergency  What you can do to manage your symptoms:   Rest your knee so it can heal   Limit activities that increase your pain  Do low-impact exercises, such as walking or swimming  Apply ice to help reduce swelling and pain  Use an ice pack, or put crushed ice in a plastic bag  Cover it with a towel before you apply it to your knee  Apply ice for 15 to 20 minutes every hour, or as directed  Apply compression to help reduce swelling  Use a brace or bandage only as directed  Elevate your knee to help decrease pain and swelling  Elevate your knee while you are sitting or lying down  Prop your leg on pillows to keep your knee above the level of your heart  Prevent your knee from moving as directed  Your healthcare provider may put on a cast or splint  You may need to wear a leg brace to stabilize your knee  A leg brace can be adjusted to increase your range of motion as your knee heals         What you can do to prevent knee pain:   Maintain a healthy weight  Extra weight increases your risk for knee pain  Ask your healthcare provider how much you should weigh  He or she can help you create a safe weight loss plan if you need to lose weight  Exercise or train properly  Use the correct equipment for sports  Wear shoes that provide good support  Check your posture often as you exercise, play sports, or train for an event  This can help prevent stress and strain on your knees  Rest between sessions so you do not overwork your knees  Follow up with your healthcare provider within 24 hours or as directed: You may need follow-up treatments, such as steroid injections to decrease pain  Write down your questions so you remember to ask them during your visits  © Copyright [employer] 2022 Information is for End User's use only and may not be sold, redistributed or otherwise used for commercial purposes  All illustrations and images included in CareNotes® are the copyrighted property of A D A M [employer] Inc  or Joseph Gabriel   The above information is an  only  It is not intended as medical advice for individual conditions or treatments  Talk to your doctor, nurse or pharmacist before following any medical regimen to see if it is safe and effective for you

## 2022-12-28 NOTE — PROGRESS NOTES
[facility]        NAME: Danilo Palencia is a 74 y o  female  : 1955    MRN: [id number]  DATE: 2022  TIME: 10:00 AM    Assessment and Plan   Right medial knee pain [M25 561]  1  Right medial knee pain  XR knee 3 vw right non injury    Ambulatory Referral to Orthopedic Surgery      2  Tinea cruris  clotrimazole-betamethasone (LOTRISONE) 1-0 05 % cream            Patient Instructions     The x-ray of your right knee revealed no acute changes  This could possibly be sciatic nerve radiating down to your right knee however, it is best to follow-up with an orthopedist to further evaluation and therapy as required  They will also suggest physical therapy after their evaluation  In the meantime, continue to use your ibuprofen 200 mg -you may use 1 to 2 tablets every 6 hours as needed for pain with meals or snacks  I generated a orthopedic referral with [facility] orthopedics as you requested  Please inform your PCP  Proceed to  ER if symptoms worsen  Chief Complaint     Chief Complaint   Patient presents with   • Knee Pain     Right knee pain for 4 days; was on prednisone for sciatic pain  Was getting better but now pain is in right knee, denies injury   • Rash     Left groin rash noted a few days ago         History of Present Illness       Patient is a 51-year-old female is presenting today with complaint of right knee pain in the medial region of the patella  Patient denies having a recent injury to her knee, trauma or surgery  The pain does not radiate down to the lower extremity or above the knee  It is worse when she crosses her legs causing the vagus stress test   The patient also has a history of sciatic back pain which radiates bilaterally to the lower extremities  She reports she is never had a reached to her knees  The patient was started recently on prednisone in which she completed the regimen and reports that the sciatic discomfort has resolved    The patient reports the knee has a 1 out of a 10 in reference to pain but can be worse in the evenings  When the pain does present in the evening she reports lower back pain as well  She reports pain control with OTC Advil 200mg  The patient denies lower extremity weakness, paresthesia or numbness  She denies bowel or bladder dysfunction  The patient is known to have osteopenia and is scheduled for her next DEXA scan  The patient is also reporting a rash to her left groin  Before I can call the nurse in the patient immediately showed me her rash which appears to be red and inflamed on the left groin region  Rash has developed over the last 3-4 days  Review of Systems   Review of Systems   Constitutional: Negative for activity change, chills and fever  Cardiovascular: Negative for chest pain  Gastrointestinal: Negative for abdominal pain and nausea  Musculoskeletal: Negative for myalgias  Skin: Positive for rash  Neurological: Negative for headaches           Current Medications       Current Outpatient Medications:   •  atorvastatin (LIPITOR) 20 mg tablet, Take 1 tablet (20 mg total) by mouth daily, Disp: 90 tablet, Rfl: 3  •  Calcium Carbonate-Vitamin D 600-400 MG-UNIT per tablet, Take 1 tablet by mouth daily, Disp: , Rfl:   •  cetirizine (ZyrTEC) 5 MG chewable tablet, Chew 5 mg as needed, Disp: , Rfl:   •  clotrimazole-betamethasone (LOTRISONE) 1-0 05 % cream, Apply topically 2 (two) times a day, Disp: 30 g, Rfl: 0  •  cycloSPORINE (Restasis) 0 05 % ophthalmic emulsion, Administer 1 drop to both eyes as needed (dry eyes), Disp: 90 mL, Rfl: 2  •  fluticasone (FLONASE) 50 mcg/act nasal spray, 2 sprays into each nostril daily, Disp: 9 9 g, Rfl: 0  •  lisinopril (ZESTRIL) 10 mg tablet, Take 1 tablet (10 mg total) by mouth daily, Disp: 90 tablet, Rfl: 1  •  Multiple Vitamins-Minerals (MULTIVITAMIN ADULT PO), Take 1 tablet by mouth daily, Disp: , Rfl:   •  nadolol (CORGARD) 20 mg tablet, Take 1 5 tablets (30 mg total) by mouth daily at bedtime, Disp: 135 tablet, Rfl: 1  •  benzonatate (TESSALON PERLES) 100 mg capsule, Take 1 capsule (100 mg total) by mouth 3 (three) times a day as needed for cough (Patient not taking: Reported on 12/28/2022), Disp: 20 capsule, Rfl: 0  •  methylPREDNISolone 4 MG tablet therapy pack, Use as directed on package (Patient not taking: Reported on 12/28/2022), Disp: 21 each, Rfl: 0  •  sodium chloride (OCEAN) 0 65 % nasal spray, 1 spray into each nostril as needed for rhinitis for up to 30 days, Disp: 10 mL, Rfl: 0  •  valACYclovir (VALTREX) 1,000 mg tablet, Take 1 tablet (1,000 mg total) by mouth 2 (two) times a day for 14 days, Disp: 90 tablet, Rfl: 3    Current Allergies     Allergies as of 12/28/2022   • (No Known Allergies)            The following portions of the patient's history were reviewed and updated as appropriate: allergies, current medications, past family history, past medical history, past social history, past surgical history and problem list      Past Medical History:   Diagnosis Date   • Hypertension    • Lymphadenopathy     LAST ASSESSED [id number]       Past Surgical History:   Procedure Laterality Date   • REDUCTION MAMMAPLASTY     • TONSILLECTOMY         Family History   Problem Relation Age of Onset   • Coronary artery disease Mother    • Diabetes Mother    • Hypertension Mother    • Osteoporosis Mother    • Diabetes Father    • Diabetes Sister    • Hypertension Sister    • Hypertension Brother          Medications have been verified  Objective   /84   Pulse 81   Temp 97 6 °F (36 4 °C) (Oral)   Resp 18   Ht 5' 7" (1 702 m)   Wt 89 4 kg (197 lb 3 2 oz)   SpO2 96%   BMI 30 89 kg/m²        Physical Exam     Physical Exam  Vitals and nursing note reviewed  Constitutional:       General: She is not in acute distress  Appearance: Normal appearance  She is not ill-appearing  Eyes:      Extraocular Movements: Extraocular movements intact  Conjunctiva/sclera: Conjunctivae normal       Pupils: Pupils are equal, round, and reactive to light  Cardiovascular:      Rate and Rhythm: Normal rate and regular rhythm  Pulses: Normal pulses  Heart sounds: Normal heart sounds  Pulmonary:      Effort: Pulmonary effort is normal       Breath sounds: Normal breath sounds  Musculoskeletal:      Cervical back: Normal range of motion and neck supple  Right knee: No swelling  Normal range of motion  No LCL laxity, MCL laxity, ACL laxity or PCL laxity  Normal pulse  Instability Tests: Anterior drawer test negative  Posterior drawer test negative  Medial Meghana test negative  Left knee: Normal    Skin:     Comments: Beefy red erythematous and inflamed lesion noted of the left inguinal region  Skin is intact  Neurological:      Mental Status: She is alert

## 2023-01-03 ENCOUNTER — OFFICE VISIT (OUTPATIENT)
Dept: OBGYN CLINIC | Facility: CLINIC | Age: 68
End: 2023-01-03

## 2023-01-03 VITALS
HEART RATE: 75 BPM | BODY MASS INDEX: 30.92 KG/M2 | SYSTOLIC BLOOD PRESSURE: 171 MMHG | HEIGHT: 67 IN | DIASTOLIC BLOOD PRESSURE: 91 MMHG | WEIGHT: 197 LBS

## 2023-01-03 DIAGNOSIS — M17.11 PRIMARY OSTEOARTHRITIS OF RIGHT KNEE: Primary | ICD-10-CM

## 2023-01-03 DIAGNOSIS — M54.30 SCIATICA, UNSPECIFIED LATERALITY: ICD-10-CM

## 2023-01-03 DIAGNOSIS — M25.561 RIGHT MEDIAL KNEE PAIN: ICD-10-CM

## 2023-01-03 RX ORDER — TRIAMCINOLONE ACETONIDE 40 MG/ML
40 INJECTION, SUSPENSION INTRA-ARTICULAR; INTRAMUSCULAR
Status: COMPLETED | OUTPATIENT
Start: 2023-01-03 | End: 2023-01-03

## 2023-01-03 RX ORDER — ROPIVACAINE HYDROCHLORIDE 5 MG/ML
10 INJECTION, SOLUTION EPIDURAL; INFILTRATION; PERINEURAL
Status: COMPLETED | OUTPATIENT
Start: 2023-01-03 | End: 2023-01-03

## 2023-01-03 RX ADMIN — ROPIVACAINE HYDROCHLORIDE 10 ML: 5 INJECTION, SOLUTION EPIDURAL; INFILTRATION; PERINEURAL at 13:25

## 2023-01-03 RX ADMIN — TRIAMCINOLONE ACETONIDE 40 MG: 40 INJECTION, SUSPENSION INTRA-ARTICULAR; INTRAMUSCULAR at 13:25

## 2023-01-03 NOTE — PROGRESS NOTES
Ortho Sports Medicine New Patient Visit     Assesment:   74 y o  female with right knee primary osteoarthritis    Plan: We had a long discussion regarding right knee degenerative joint disease including options for treatment  She is currently responsible for caring for her sister and is interested in immediate relief of pain if possible  We discussed doing a steroid injection today to help relieve pain  We discussed that steroid injections have variable efficacy for patients' pain, but that this is a reasonable option that may provide the patient a lot of relief for the time being  I informed the patient that steroid injections will raise her glucose level, but only temporarily for the next day or so  We discussed that I recommended starting physical therapy when the patient is able to do so as I do believe this will help with strength and mobility, and may improved symptoms significantly  We also discussed using a medial  brace to take the pressure off the medial knee where most of her arthritis is located, but the patient did not want to receive a brace today  We agreed that if she continues to have knee pain, the brace can be an additional treatment option  Given the patient's history of sciatica, I recommended a referral to the [facility]  We discussed that physical therapy would also be a beneficial option for treatment of her sciatica  Chief Complaint   Patient presents with   • Right Knee - Pain       History of Present Illness: The patient is a 74 y o  female with PMH significant for sciatica and pre-diabetes presenting with right knee pain for 2 weeks  The pain was initially so severe that the patient felt she could not bear weight and required a cane for support  Since then, the patient reports achy pain worst with walking and when getting out of bed in the morning  She has the cane available if needed for support, but has not felt unstable   She denies any injury, locking episodes, or current numbness or tingling down the leg  The patient uses ibuprofen as needed for pain relief with minimal relief        Knee Surgical History:  None    Past Medical, Social and Family History:  Past Medical History:   Diagnosis Date   • Hypertension    • Lymphadenopathy     LAST ASSESSED [id number]     Past Surgical History:   Procedure Laterality Date   • REDUCTION MAMMAPLASTY     • TONSILLECTOMY       No Known Allergies  Current Outpatient Medications on File Prior to Visit   Medication Sig Dispense Refill   • atorvastatin (LIPITOR) 20 mg tablet Take 1 tablet (20 mg total) by mouth daily 90 tablet 3   • Calcium Carbonate-Vitamin D 600-400 MG-UNIT per tablet Take 1 tablet by mouth daily     • cetirizine (ZyrTEC) 5 MG chewable tablet Chew 5 mg as needed     • clotrimazole-betamethasone (LOTRISONE) 1-0 05 % cream Apply topically 2 (two) times a day 30 g 0   • cycloSPORINE (Restasis) 0 05 % ophthalmic emulsion Administer 1 drop to both eyes as needed (dry eyes) 90 mL 2   • fluticasone (FLONASE) 50 mcg/act nasal spray 2 sprays into each nostril daily 9 9 g 0   • lisinopril (ZESTRIL) 10 mg tablet Take 1 tablet (10 mg total) by mouth daily 90 tablet 1   • methylPREDNISolone 4 MG tablet therapy pack Use as directed on package 21 each 0   • Multiple Vitamins-Minerals (MULTIVITAMIN ADULT PO) Take 1 tablet by mouth daily     • nadolol (CORGARD) 20 mg tablet Take 1 5 tablets (30 mg total) by mouth daily at bedtime 135 tablet 1   • benzonatate (TESSALON PERLES) 100 mg capsule Take 1 capsule (100 mg total) by mouth 3 (three) times a day as needed for cough (Patient not taking: Reported on 12/28/2022) 20 capsule 0   • sodium chloride (OCEAN) 0 65 % nasal spray 1 spray into each nostril as needed for rhinitis for up to 30 days 10 mL 0   • valACYclovir (VALTREX) 1,000 mg tablet Take 1 tablet (1,000 mg total) by mouth 2 (two) times a day for 14 days 90 tablet 3     No current facility-administered medications on file prior to visit  Social History     Socioeconomic History   • Marital status: /Civil Union     Spouse name: Not on file   • Number of children: Not on file   • Years of education: Not on file   • Highest education level: Not on file   Occupational History   • Not on file   Tobacco Use   • Smoking status: Never   • Smokeless tobacco: Never   Substance and Sexual Activity   • Alcohol use: Yes     Alcohol/week: 1 0 standard drink     Types: 1 Cans of beer per week     Comment: social   • Drug use: No   • Sexual activity: Yes     Partners: Male     Birth control/protection: None   Other Topics Concern   • Not on file   Social History Narrative   • Not on file     Social Determinants of Health     Financial Resource Strain: Low Risk    • Difficulty of Paying Living Expenses: Not hard at all   Food Insecurity: Not on file   Transportation Needs: No Transportation Needs   • Lack of Transportation (Medical): No   • Lack of Transportation (Non-Medical): No   Physical Activity: Not on file   Stress: Not on file   Social Connections: Not on file   Intimate Partner Violence: Not on file   Housing Stability: Not on file         I have reviewed the past medical, surgical, social and family history, medications and allergies as documented in the EMR  Review of systems: ROS is negative other than that noted in the HPI  Constitutional: Negative for fatigue and fever  HENT: Negative for sore throat  Respiratory: Negative for shortness of breath  Cardiovascular: Negative for chest pain  Gastrointestinal: Negative for abdominal pain  Endocrine: Negative for cold intolerance and heat intolerance  Genitourinary: Negative for flank pain  Musculoskeletal: Negative for back pain  Skin: Negative for rash  Allergic/Immunologic: Negative for immunocompromised state  Neurological: Negative for dizziness  Psychiatric/Behavioral: Negative for agitation        Physical Exam:    Blood pressure (!) 171/91, pulse 75, height 5' 7" (1 702 m), weight 89 4 kg (197 lb), not currently breastfeeding  General/Constitutional: NAD, well developed, well nourished  HENT: Normocephalic, atraumatic  CV: Intact distal pulses, regular rate  Resp: No respiratory distress or labored breathing  Lymphatic: No lymphadenopathy palpated  Neuro: Alert and Oriented x 3, no focal deficits  Psych: Normal mood, normal affect  Skin: Warm, dry, no rashes, no erythema      Knee Exam:   No significant skin lesions or deformity  Range of motion from full extension to full flexion  Limited patellar mobility  Mild medial joint line tenderness  Knee is stable to varus stress, valgus stress, Lachman, and posterior drawer  Neurovascularly intact distally      Knee Imaging    X-rays of the right knee from 12/28/22 reviewed today  These show no acute fractures  Mild degenerative changes  Patella is well centered on the trochlea  Large joint arthrocentesis: R knee  Universal Protocol:  Consent: Verbal consent obtained  Risks and benefits: risks, benefits and alternatives were discussed  Consent given by: patient  Timeout called at: 1/3/2023 1:00 PM   Patient understanding: patient states understanding of the procedure being performed  Site marked: the operative site was marked  Radiology Images displayed and confirmed   If images not available, report reviewed: imaging studies available  Patient identity confirmed: verbally with patient    Supporting Documentation  Indications: pain   Procedure Details  Location: knee - R knee  Needle size: 22 G  Ultrasound guidance: no  Approach: superolateral   Medications administered: 40 mg triamcinolone acetonide 40 mg/mL; 10 mL ropivacaine 0 5 %    Patient tolerance: patient tolerated the procedure well with no immediate complications

## 2023-01-05 ENCOUNTER — TELEPHONE (OUTPATIENT)
Dept: PHYSICAL THERAPY | Facility: OTHER | Age: 68
End: 2023-01-05

## 2023-01-05 NOTE — TELEPHONE ENCOUNTER
Call placed to the patient per [facility] referral     Spoke with patient and explained csp to her  I let her know she had a direct referral in her chart entered the same day as ours for PT  That referral was for DX Osteoarthritis AND Sciatica  Patient stated she is scheduled for PT on Monday for the Osteoarthritis  I suggested she talk to the PT office on Monday when she goes, to find out how she can obtain treatment for both DX on the PT referral  She should not need triage by csp with that referral already entered       Will assist patient if she calls back with any issues    [facility] closed

## 2023-01-09 ENCOUNTER — EVALUATION (OUTPATIENT)
Dept: PHYSICAL THERAPY | Age: 68
End: 2023-01-09

## 2023-01-09 DIAGNOSIS — M17.11 PRIMARY OSTEOARTHRITIS OF RIGHT KNEE: ICD-10-CM

## 2023-01-09 DIAGNOSIS — M25.561 ACUTE PAIN OF RIGHT KNEE: Primary | ICD-10-CM

## 2023-01-09 DIAGNOSIS — M54.30 SCIATICA, UNSPECIFIED LATERALITY: ICD-10-CM

## 2023-01-09 NOTE — PROGRESS NOTES
PT Evaluation     Today's date: 2023  Patient name: Jaswinder Villagomez  : 1955  MRN: [id number]  Referring provider: Tomeka Orona  Dx:   Encounter Diagnosis     ICD-10-CM    1  Acute pain of right knee  M25 561       2  Primary osteoarthritis of right knee  M17 11 Ambulatory Referral to Physical Therapy      3  Sciatica, unspecified laterality  M54 30 Ambulatory Referral to Physical Therapy          Start Time: 930  Stop Time: 1020  Total time in clinic (min): 50 minutes    Assessment  Assessment details: Pt is a 75 y/o female who presents with R knee pain  No further referral is necessary at this time  Pt has a movement impairment diagnosis of impaired quad/hip abductor recruitment representing a pathoantomical diagnosis of knee OA with quad strain  Pt is experiencing pain, decreased strength, and decreased ROM  Pt has a positive prognosis  Pt would benefit from PT to address these impairments leading to increased functional capacity and improved quality of life  Impairments: abnormal or restricted ROM, impaired physical strength, lacks appropriate home exercise program, pain with function, poor posture  and poor body mechanics  Understanding of Dx/Px/POC: good   Prognosis: good    Plan  Patient would benefit from: skilled physical therapy  Planned modality interventions: cryotherapy and thermotherapy: hydrocollator packs  Planned therapy interventions: neuromuscular re-education, patient education, stretching, strengthening, therapeutic activities, therapeutic exercise, therapeutic training, home exercise program and graded activity  Frequency: Twice a week for 10 weeks  Treatment plan discussed with: patient        Subjective Evaluation    History of Present Illness  Mechanism of injury: Pt reports flair up of sciatic nerve pain starting in 2022 which lead to altered gait pattern  On gina day woke up with terrible knee pain (no MAURICIO), and eventually went to ED on   Xrays negative  Was evaluated by orthopedist of 22, attributed pain to OA patient was given injection with mild relief  Patient is now presenting to PT with medial knee pain that waxes and weans  Walking is most painful and has stiffness after sitting long periods of time  Patient is active and usually walks 3-5 miles per day when not having knee pain  Patient also enjoys lifting weights and working out on yoga mat  Patient did mention older sister is struggling with her health and she has some responsibility as a caretaker  Quality of life: good    Pain  Current pain ratin  At best pain ratin  At worst pain ratin  Quality: sharp, radiating and dull ache  Aggravating factors: overhead activity, keyboarding and lifting    Hand dominance: right    Patient Goals  Patient goals for therapy: decreased pain, independence with ADLs/IADLs, return to sport/leisure activities, increased strength and increased motion          Objective     Tenderness     Right Knee   No tenderness in the medial joint line and pes anserinus  Active Range of Motion   Left Knee   Flexion: 135 degrees   Extension: 0 degrees     Right Knee   Flexion: 135 degrees   Extension: 0 degrees with pain    Strength/Myotome Testing     Right Hip   Planes of Motion   Flexion: 4-  Abduction: 4-  External rotation: 4-    Left Knee   Flexion: 5  Extension: 5  Quadriceps contraction: good    Right Knee   Flexion: 4  Extension: 4  Quadriceps contraction: fair    Tests     Right Knee   Positive Thessaly's test at 5 degrees  Negative anterior drawer, anterior Lachman, medial Meghana, posterior drawer, posterior Lachman, posterior sag, valgus stress test at 0 degrees, valgus stress test at 30 degrees, varus stress test at 0 degrees, varus stress test at 30 degrees and [place] knee rules   General Comments:      Knee Comments  Patient unable to perform SLS due to pain  Step to pattern exhibited during stair navigation     Moderate limitations in gatroc mobility in R compared to L                Precautions: N/A      Manuals                                                                 Neuro Re-Ed             Standing hip abduction             Standing TKE ball                                                                              Ther Ex             Quad set             gatroc stretch strap long sit             clamshells             LAQ             recumbent bike             Standing gastroc stretch                                       Ther Activity                                       Gait Training                                       Modalities

## 2023-01-17 ENCOUNTER — OFFICE VISIT (OUTPATIENT)
Dept: PHYSICAL THERAPY | Age: 68
End: 2023-01-17

## 2023-01-17 DIAGNOSIS — M25.561 ACUTE PAIN OF RIGHT KNEE: Primary | ICD-10-CM

## 2023-01-17 DIAGNOSIS — M17.11 PRIMARY OSTEOARTHRITIS OF RIGHT KNEE: ICD-10-CM

## 2023-01-17 DIAGNOSIS — M54.30 SCIATICA, UNSPECIFIED LATERALITY: ICD-10-CM

## 2023-01-17 NOTE — PROGRESS NOTES
Daily Note     Today's date: 2023  Patient name: Tal Fortune  : 1955  MRN: [id number]  Referring provider: Keyur Mosquera  Dx:   Encounter Diagnosis     ICD-10-CM    1  Acute pain of right knee  M25 561       2  Primary osteoarthritis of right knee  M17 11       3  Sciatica, unspecified laterality  M54 30           Start Time: 1100  Stop Time: 1145  Total time in clinic (min): 45 minutes    Subjective: Pt reports improvements in symptoms  Still has difficulty with ambulating community distances  Objective: See treatment diary below      Assessment: Tolerated treatment well  POC progressed to include more closed chain interventions  Patient demonstrated fatigue post treatment and would benefit from continued PT      Plan: Continue per plan of care        Precautions: N/A      Manuals             AP femoral tibial glides JF            PROM JF                                      Neuro Re-Ed             Standing hip abduction 2*10 b/l            Standing TKE ball 3*10*5"            SLR 3*10                                                                Ther Ex             Quad set 20*5"            gatroc stretch strap long sit             clamshells 3*10            LAQ             recumbent bike 10'            Standing gastroc stretch 10*10" b/l                                      Ther Activity                                       Gait Training                                       Modalities

## 2023-01-19 ENCOUNTER — OFFICE VISIT (OUTPATIENT)
Dept: PHYSICAL THERAPY | Age: 68
End: 2023-01-19

## 2023-01-19 DIAGNOSIS — M25.561 ACUTE PAIN OF RIGHT KNEE: Primary | ICD-10-CM

## 2023-01-19 DIAGNOSIS — M17.11 PRIMARY OSTEOARTHRITIS OF RIGHT KNEE: ICD-10-CM

## 2023-01-19 DIAGNOSIS — M54.30 SCIATICA, UNSPECIFIED LATERALITY: ICD-10-CM

## 2023-01-19 NOTE — PROGRESS NOTES
Daily Note     Today's date: 2023  Patient name: Merline Bergeron  : 1955  MRN: [id number]  Referring provider: Courtney Razo  Dx:   Encounter Diagnosis     ICD-10-CM    1  Acute pain of right knee  M25 561       2  Primary osteoarthritis of right knee  M17 11       3  Sciatica, unspecified laterality  M54 30           Start Time: 5178  Stop Time: 1230  Total time in clinic (min): 45 minutes    Subjective: Pt felt sick past couple days, but felt better today  Pt says knee feels better and no longer walks with a limp  Objective: See treatment diary below      Assessment: Tolerated treatment well  Pt was able to progress to include more functional interventions such as mini-squat  Patient exhibited good technique with therapeutic exercises and would benefit from continued PT      Plan: Continue per plan of care        Precautions: N/A      Manuals            AP femoral tibial glides JF EL           PROM JF EL                                     Neuro Re-Ed             Standing hip abduction 2*10 b/l 3*10  b/l           Standing TKE ball 3*10*5" 3*10*5"           SLR 3*10 3*10*5"           Mini-squat  3*10                                                  Ther Ex             Quad set 20*5"            gatroc stretch strap long sit             clamshells 3*10 3*10           LAQ             recumbent bike 10'            Standing gastroc stretch 10*10" b/l 10*10"  b/l           Nu step  10'                        Ther Activity                                       Gait Training                                       Modalities

## 2023-01-24 ENCOUNTER — OFFICE VISIT (OUTPATIENT)
Dept: PHYSICAL THERAPY | Age: 68
End: 2023-01-24

## 2023-01-24 DIAGNOSIS — M25.561 ACUTE PAIN OF RIGHT KNEE: Primary | ICD-10-CM

## 2023-01-24 DIAGNOSIS — M54.30 SCIATICA, UNSPECIFIED LATERALITY: ICD-10-CM

## 2023-01-24 DIAGNOSIS — M17.11 PRIMARY OSTEOARTHRITIS OF RIGHT KNEE: ICD-10-CM

## 2023-01-24 NOTE — PROGRESS NOTES
Daily Note     Today's date: 2023  Patient name: Herlinda Beckwith  : 1955  MRN: [id number]  Referring provider: Isaias Cordoba  Dx:   Encounter Diagnosis     ICD-10-CM    1  Acute pain of right knee  M25 561       2  Primary osteoarthritis of right knee  M17 11       3  Sciatica, unspecified laterality  M54 30                      Subjective: Pt missed HEP over the weekend and knee feels the same as the previous session  Pt felt tightness of quad during SLR  Objective: See treatment diary below      Assessment: Tolerated treatment well  Pt's LE strengthening interventions were progressed to include STS, Step-ups, and leg press to improve neuromuscular control  Patient exhibited good technique with therapeutic exercises and would benefit from continued PT      Plan: Continue per plan of care        Precautions: N/A      Manuals           AP femoral tibial glides JF EL EL          PROM JF EL EL                                    Neuro Re-Ed             Standing hip abduction 2*10 b/l 3*10  b/l 3*10 b/l          Standing TKE ball 3*10*5" 3*10*5"           SLR 3*10 3*10*5" 3*10          Mini-squat  3*10 3*10          Step-ups w/ R   3*10           STS   3*10                       Ther Ex             Quad set 20*5"            gatroc stretch strap long sit             clamshells 3*10 3*10 3*10          LAQ             recumbent bike 10'            Standing gastroc stretch 10*10" b/l 10*10"  b/l 10*10"  b/l          Nu step  10' 10'          Leg Press   3x10  95          Ther Activity                                       Gait Training                                       Modalities                                         Session was completed by JULIA Royal, with 100% supervision and POC directed by PT

## 2023-01-26 ENCOUNTER — OFFICE VISIT (OUTPATIENT)
Dept: PHYSICAL THERAPY | Age: 68
End: 2023-01-26

## 2023-01-26 DIAGNOSIS — M25.561 ACUTE PAIN OF RIGHT KNEE: Primary | ICD-10-CM

## 2023-01-26 DIAGNOSIS — M54.30 SCIATICA, UNSPECIFIED LATERALITY: ICD-10-CM

## 2023-01-26 DIAGNOSIS — M17.11 PRIMARY OSTEOARTHRITIS OF RIGHT KNEE: ICD-10-CM

## 2023-01-26 NOTE — PROGRESS NOTES
Daily Note     Today's date: 2023  Patient name: Herlinda Beckwith  : 1955  MRN: [id number]  Referring provider: Isaias Cordoba  Dx:   Encounter Diagnosis     ICD-10-CM    1  Acute pain of right knee  M25 561       2  Primary osteoarthritis of right knee  M17 11       3  Sciatica, unspecified laterality  M54 30           Start Time: 1015  Stop Time: 1100  Total time in clinic (min): 45 minutes    Subjective: Pt reports symptoms have not changed since last session  Objective: See treatment diary below      Assessment: Tolerated treatment well  Pt's endurance has improved during R leg strengthening interventions compared to previous session  Pt's POC was progressed to include lumbar extension to improve hip mechanics during ambulation Patient exhibited good technique with therapeutic exercises and would benefit from continued PT      Plan: Continue per plan of care        Precautions: N/A      Manuals           AP femoral tibial glides JF EL EL           PROM JF EL EL                                       Neuro Re-Ed              Standing hip abduction 2*10 b/l 3*10  b/l 3*10 b/l           Standing TKE ball 3*10*5" 3*10*5"            SLR 3*10 3*10*5" 3*10 3*10          Mini-squat  3*10 3*10 3*10          Step-ups w/ R   3*10  3*10          STS   3*10 3*10          Lumbar extension     3*10          Ther Ex              Quad set 20*5"             gatroc stretch strap long sit              clamshells 3*10 3*10 3*10 3*10          LAQ              recumbent bike 10'             Standing gastroc stretch 10*10" b/l 10*10"  b/l 10*10"  b/l           Nu step  10' 10' 10'          Leg Press   3x10  95           Ther Activity                                          Gait Training                                          Modalities                                                          Session was completed by JULIA Royal, with 100% supervision and POC directed by PT

## 2023-01-31 ENCOUNTER — OFFICE VISIT (OUTPATIENT)
Dept: PHYSICAL THERAPY | Age: 68
End: 2023-01-31

## 2023-01-31 DIAGNOSIS — M54.30 SCIATICA, UNSPECIFIED LATERALITY: ICD-10-CM

## 2023-01-31 DIAGNOSIS — M17.11 PRIMARY OSTEOARTHRITIS OF RIGHT KNEE: ICD-10-CM

## 2023-01-31 DIAGNOSIS — M25.561 ACUTE PAIN OF RIGHT KNEE: Primary | ICD-10-CM

## 2023-01-31 NOTE — PROGRESS NOTES
Daily Note     Today's date: 2023  Patient name: Carol Goldberg  : 1955  MRN: [id number]  Referring provider: Amandeep Jiang  Dx:   Encounter Diagnosis     ICD-10-CM    1  Acute pain of right knee  M25 561       2  Primary osteoarthritis of right knee  M17 11       3  Sciatica, unspecified laterality  M54 30           Start Time: 2588  Stop Time: 1300  Total time in clinic (min): 45 minutes    Subjective: Pt reports improvements functionally but is still having pain  Objective: See treatment diary below      Assessment: Tolerated treatment well  Pt's improvements are demonstrated in FOTO score  Pt's POC was modified to include more mobilization to improve medial joint space and repeated motions (ext) throughout day to improve symptoms  Patient demonstrated fatigue post treatment and would benefit from continued PT      Plan: Continue per plan of care        Precautions: N/A      Manuals          AP femoral tibial glides JF EL EL  JF         PROM JF EL EL  JF         DIsctraction during LAQ with ER                            Neuro Re-Ed              Standing hip abduction 2*10 b/l 3*10  b/l 3*10 b/l           Standing TKE ball 3*10*5" 3*10*5"            SLR 3*10 3*10*5" 3*10 3*10 3*10         Mini-squat  3*10 3*10 3*10 3*10         Step-ups w/ R   3*10  3*10          STS   3*10 3*10          Lumbar extension     3*10 3*10         Ther Ex              Quad set 20*5"             gatroc stretch strap long sit              clamshells 3*10 3*10 3*10 3*10          LAQ              Seated ext     40x                       Nu step  10' 10' 10'          Leg Press   3x10  95           Ther Activity                                          Gait Training                                          Modalities                                                          Session was completed by JULIA Burton, with 100% supervision and POC directed by PT

## 2023-02-02 ENCOUNTER — OFFICE VISIT (OUTPATIENT)
Dept: PHYSICAL THERAPY | Age: 68
End: 2023-02-02

## 2023-02-02 DIAGNOSIS — M17.11 PRIMARY OSTEOARTHRITIS OF RIGHT KNEE: ICD-10-CM

## 2023-02-02 DIAGNOSIS — M54.30 SCIATICA, UNSPECIFIED LATERALITY: ICD-10-CM

## 2023-02-02 DIAGNOSIS — M25.561 ACUTE PAIN OF RIGHT KNEE: Primary | ICD-10-CM

## 2023-02-02 NOTE — PROGRESS NOTES
Daily Note     Today's date: 2023  Patient name: Sonia Enriquez  : 1955  MRN: [id number]  Referring provider: Jon Stanford  Dx:   Encounter Diagnosis     ICD-10-CM    1  Acute pain of right knee  M25 561       2  Primary osteoarthritis of right knee  M17 11       3  Sciatica, unspecified laterality  M54 30                      Subjective: pt reports back and R knee are feeling better, pt reports HEP compliance has helped relieve her sx, pt reports she does feel tightness at times R medial knee      Objective: See treatment diary below      Assessment:  Pt completed there ex with min discomfort/fatigue noted, no significant ext lag noted during R SLR    Plan: Continue per plan of care        Precautions: N/A      Manuals         AP femoral tibial glides JF EL EL  JF         PROM JF EL EL  JF VK        DIsctraction during LAQ with ER                            Neuro Re-Ed      2/2        Standing hip abduction 2*10 b/l 3*10  b/l 3*10 b/l           Standing TKE ball 3*10*5" 3*10*5"    3x10x5"        SLR 3*10 3*10*5" 3*10 3*10 3*10 3x10        Mini-squat  3*10 3*10 3*10 3*10 3x10x5"        Step-ups w/ R   3*10  3*10  6" 2x10        STS   3*10 3*10  2x15        Lumbar extension     3*10 3*10 3x10        Ther Ex      2/2        Quad set 20*5"             gatroc stretch strap long sit              clamshells 3*10 3*10 3*10 3*10  3x10x3"        LAQ              Seated ext     40x                       Nu step  10' 10' 10'  10'        Leg Press   3x10  95   nv        Ther Activity                                          Gait Training                                          Modalities

## 2023-02-07 ENCOUNTER — HOSPITAL ENCOUNTER (OUTPATIENT)
Dept: RADIOLOGY | Age: 68
Discharge: HOME | End: 2023-02-07

## 2023-02-07 ENCOUNTER — OFFICE VISIT (OUTPATIENT)
Dept: PHYSICAL THERAPY | Age: 68
End: 2023-02-07

## 2023-02-07 VITALS — BODY MASS INDEX: 30.13 KG/M2 | HEIGHT: 67 IN | WEIGHT: 192 LBS

## 2023-02-07 DIAGNOSIS — M17.11 PRIMARY OSTEOARTHRITIS OF RIGHT KNEE: ICD-10-CM

## 2023-02-07 DIAGNOSIS — M81.0 AGE-RELATED OSTEOPOROSIS WITHOUT CURRENT PATHOLOGICAL FRACTURE: ICD-10-CM

## 2023-02-07 DIAGNOSIS — M25.561 ACUTE PAIN OF RIGHT KNEE: Primary | ICD-10-CM

## 2023-02-07 DIAGNOSIS — M54.30 SCIATICA, UNSPECIFIED LATERALITY: ICD-10-CM

## 2023-02-07 NOTE — PROGRESS NOTES
Daily Note     Today's date: 2023  Patient name: Julianne Ramírez  : 1955  MRN: [id number]  Referring provider: Mary Andersen  Dx:   Encounter Diagnosis     ICD-10-CM    1  Acute pain of right knee  M25 561       2  Primary osteoarthritis of right knee  M17 11       3  Sciatica, unspecified laterality  M54 30           Start Time: 0930  Stop Time: 1015  Total time in clinic (min): 45 minutes     Subjective: Pt reports improvements in tolerance to community ambulation  Objective: See treatment diary below      Assessment: Tolerated treatment well  POC progressed to include more advanced closed chain strengthening  Patient demonstrated fatigue post treatment and would benefit from continued PT      Plan: Continue per plan of care        Precautions: N/A      Manuals        AP femoral tibial glides JF EL EL  JF  JF       PROM JF EL EL  JF VK JF       DIsctraction during LAQ with ER                            Neuro Re-Ed        Standing hip abduction 2*10 b/l 3*10  b/l 3*10 b/l           Standing TKE ball 3*10*5" 3*10*5"    3x10x5"        SLR 3*10 3*10*5" 3*10 3*10 3*10 3x10 3x10       Mini-squat  3*10 3*10 3*10 3*10 3x10x5" 3*10       Step-ups w/ R   3*10  3*10  6" 2x10 3*10       STS   3*10 3*10  2x15 2*15       Leg press       nv       Lateral and monster walks       3 laps both gtb                     Lumbar extension     3*10 3*10 3x10        Ther Ex         Quad set 20*5"             gatroc stretch strap long sit              clamshells 3*10 3*10 3*10 3*10  3x10x3"        LAQ              Seated ext     40x                       Nu step  10' 10' 10'  10' 10' bike       Leg Press   3x10  95   nv        Ther Activity                                          Gait Training                                          Modalities

## 2023-02-09 ENCOUNTER — OFFICE VISIT (OUTPATIENT)
Dept: PHYSICAL THERAPY | Age: 68
End: 2023-02-09

## 2023-02-09 DIAGNOSIS — M25.561 ACUTE PAIN OF RIGHT KNEE: Primary | ICD-10-CM

## 2023-02-09 DIAGNOSIS — M54.30 SCIATICA, UNSPECIFIED LATERALITY: ICD-10-CM

## 2023-02-09 DIAGNOSIS — M17.11 PRIMARY OSTEOARTHRITIS OF RIGHT KNEE: ICD-10-CM

## 2023-02-09 NOTE — PROGRESS NOTES
Daily Note     Today's date: 2023  Patient name: Tal Fortune  : 1955  MRN: [id number]  Referring provider: Keyur Mosquera  Dx:   Encounter Diagnosis     ICD-10-CM    1  Acute pain of right knee  M25 561       2  Primary osteoarthritis of right knee  M17 11       3  Sciatica, unspecified laterality  M54 30                      Subjective: pt reports feeling a little better but still having pain R medial knee      Objective: See treatment diary below      Assessment: today's treatment focused on quad recruitment, avoiding lateral movements, pt reported feeling slightly better after there ex    Plan: Continue per plan of care  Progress treatment as tolerated         Precautions: N/A      Manuals       AP femoral tibial glides JF EL EL  JF  JF JF      PROM JF EL EL  JF VK JF VK      DIsctraction during LAQ with ER                            Neuro Re-Ed       Standing hip abduction 2*10 b/l 3*10  b/l 3*10 b/l           Standing TKE ball 3*10*5" 3*10*5"    3x10x5"  3x10x5"      SLR 3*10 3*10*5" 3*10 3*10 3*10 3x10 3x10 3x10      Mini-squat  3*10 3*10 3*10 3*10 3x10x5" 3*10 np      Step-ups w/ R   3*10  3*10  6" 2x10 3*10 6" 3x10      STS   3*10 3*10  2x15 2*15 2x15      Lateral and monster walks       3 laps both gtb np                    Lumbar extension     3*10 3*10 3x10        Ther Ex       Quad set 20*5"             gatroc stretch strap long sit              clamshells 3*10 3*10 3*10 3*10  3x10x3"  3/10x3'      LAQ              Seated ext     40x                       Nu step  10' 10' 10'  10' 10' bike NS 10'      Leg Press   3x10  95   nv  75# 3x10      Ther Activity                                          Gait Training                                          Modalities

## 2023-02-14 ENCOUNTER — OFFICE VISIT (OUTPATIENT)
Dept: PHYSICAL THERAPY | Age: 68
End: 2023-02-14

## 2023-02-14 DIAGNOSIS — M17.11 PRIMARY OSTEOARTHRITIS OF RIGHT KNEE: ICD-10-CM

## 2023-02-14 DIAGNOSIS — M54.30 SCIATICA, UNSPECIFIED LATERALITY: ICD-10-CM

## 2023-02-14 DIAGNOSIS — M25.561 ACUTE PAIN OF RIGHT KNEE: Primary | ICD-10-CM

## 2023-02-14 NOTE — PROGRESS NOTES
Daily Note     Today's date: 2023  Patient name: Debora Hart  : 1955  MRN: [id number]  Referring provider: Karla Posada  Dx:   Encounter Diagnosis     ICD-10-CM    1  Acute pain of right knee  M25 561       2  Primary osteoarthritis of right knee  M17 11       3  Sciatica, unspecified laterality  M54 30           Start Time: 0800  Stop Time: 0845  Total time in clinic (min): 45 minutes    Subjective: Pt reports knee symptoms have improved, but has been experiencing R hip pain and sciatica  Objective: See treatment diary below      Assessment: Tolerated treatment well  Pt's treatment interventions focused on prone press ups, lumbar pa mobs, and standing extension to improve symptoms of R hip pain  Pt reported no improvements of R hip pain during LAD and Inferior caudal glides  Pt's HEP was progressed to include prone press ups and standing extension  Patient exhibited good technique with therapeutic exercises and would benefit from continued PT      Plan: Continue per plan of care        Precautions: N/A      Manuals      AP femoral tibial glides JF EL EL  JF  JF JF      LAD         EL     PROM JF EL EL  JF VK JF VK      Lumbar PA mobs         EL     DIsctraction during LAQ with ER              Inferior Caudal Glides R LE         EL     Neuro Re-Ed       Standing hip abduction 2*10 b/l 3*10  b/l 3*10 b/l           Standing TKE ball 3*10*5" 3*10*5"    3x10x5"  3x10x5"      SLR 3*10 3*10*5" 3*10 3*10 3*10 3x10 3x10 3x10      Mini-squat  3*10 3*10 3*10 3*10 3x10x5" 3*10 np      Step-ups w/ R   3*10  3*10  6" 2x10 3*10 6" 3x10      STS   3*10 3*10  2x15 2*15 2x15      Lateral and monster walks       3 laps both gtb np      Bent Knee fallouts         4x10     Lumbar extension     3*10 3*10 3x10   3x10     Ther Ex      2      Quad set 20*5"             gatroc stretch strap long sit              clamshells 3*10 3*10 3*10 3*10 3x10x3"  3/10x3'      LAQ              Seated ext     40x                       Nu step  10' 10' 10'  10' 10' bike NS 10' NS 10'     Leg Press   3x10  95   nv  75# 3x10      Ther Activity                                          Gait Training                                          Modalities

## 2023-02-16 ENCOUNTER — APPOINTMENT (OUTPATIENT)
Dept: PHYSICAL THERAPY | Age: 68
End: 2023-02-16

## 2023-02-17 ENCOUNTER — OFFICE VISIT (OUTPATIENT)
Dept: OBGYN CLINIC | Facility: CLINIC | Age: 68
End: 2023-02-17

## 2023-02-17 VITALS
HEART RATE: 74 BPM | SYSTOLIC BLOOD PRESSURE: 183 MMHG | BODY MASS INDEX: 30.86 KG/M2 | WEIGHT: 192 LBS | HEIGHT: 66 IN | DIASTOLIC BLOOD PRESSURE: 98 MMHG

## 2023-02-17 DIAGNOSIS — M75.82 ROTATOR CUFF TENDINITIS, LEFT: Primary | ICD-10-CM

## 2023-02-17 NOTE — PROGRESS NOTES
Ortho Sports Medicine Follow Up Patient Visit     Assesment:   74 y o  female with left rotator cuff tendinitis    Plan:    We discussed that the majority of her symptoms seem to be related to her rotator cuff  She has mild weakness on exam that seems to be related to pain  I believe that the patient would greatly benefit from physical therapy focusing on building rotator cuff strength  If the patient's symptoms persist and her weakness continues by her next visit, we can consider further investigation with an MRI to assess the rotator cuff  In the meantime, the patient can use ibuprofen as needed for pain and using pain as a guide for activity modifications  Follow Up:     6 weeks for recheck following PT      History of Present Illness: The patient is a 74 y o  female presenting for follow up of right knee osteoarthritis and chronic left shoulder pain  The patient states significant pain relief in the knee from working with PT  She also does her stretches and exercises at home and reports feeling great  In terms of the shoulder, the patient reports chronic, atraumatic pain without weakness associated with lifting and overhead motions  She states that she has been modifying her activities to prevent pain, but the pain has been more frequent lately as she is lifting her granddaughter  The patient denies any physical therapy for the shoulder so far         Shoulder Surgical History:  None    Past Medical, Social and Family History:  Past Medical History:   Diagnosis Date   • Hypertension    • Lymphadenopathy     LAST ASSESSED [id number]     Past Surgical History:   Procedure Laterality Date   • REDUCTION MAMMAPLASTY     • TONSILLECTOMY       No Known Allergies  Current Outpatient Medications on File Prior to Visit   Medication Sig Dispense Refill   • atorvastatin (LIPITOR) 20 mg tablet Take 1 tablet (20 mg total) by mouth daily 90 tablet 3   • benzonatate (TESSALON PERLES) 100 mg capsule Take 1 capsule (100 mg total) by mouth 3 (three) times a day as needed for cough (Patient not taking: Reported on 12/28/2022) 20 capsule 0   • Calcium Carbonate-Vitamin D 600-400 MG-UNIT per tablet Take 1 tablet by mouth daily     • cetirizine (ZyrTEC) 5 MG chewable tablet Chew 5 mg as needed     • clotrimazole-betamethasone (LOTRISONE) 1-0 05 % cream Apply topically 2 (two) times a day 30 g 0   • cycloSPORINE (Restasis) 0 05 % ophthalmic emulsion Administer 1 drop to both eyes as needed (dry eyes) 90 mL 2   • Diclofenac Sodium (VOLTAREN) 1 % Apply 2 g topically 4 (four) times a day 100 g 0   • fluticasone (FLONASE) 50 mcg/act nasal spray 2 sprays into each nostril daily 9 9 g 0   • lisinopril (ZESTRIL) 10 mg tablet Take 1 tablet (10 mg total) by mouth daily 90 tablet 1   • methylPREDNISolone 4 MG tablet therapy pack Use as directed on package 21 each 0   • Multiple Vitamins-Minerals (MULTIVITAMIN ADULT PO) Take 1 tablet by mouth daily     • nadolol (CORGARD) 20 mg tablet Take 1 5 tablets (30 mg total) by mouth daily at bedtime 135 tablet 1   • sodium chloride (OCEAN) 0 65 % nasal spray 1 spray into each nostril as needed for rhinitis for up to 30 days 10 mL 0   • valACYclovir (VALTREX) 1,000 mg tablet Take 1 tablet (1,000 mg total) by mouth 2 (two) times a day for 14 days 90 tablet 3     No current facility-administered medications on file prior to visit  Social History     Socioeconomic History   • Marital status: /Civil Union     Spouse name: Not on file   • Number of children: Not on file   • Years of education: Not on file   • Highest education level: Not on file   Occupational History   • Not on file   Tobacco Use   • Smoking status: Never   • Smokeless tobacco: Never   Substance and Sexual Activity   • Alcohol use:  Yes     Alcohol/week: 1 0 standard drink     Types: 1 Cans of beer per week     Comment: social   • Drug use: No   • Sexual activity: Yes     Partners: Male     Birth control/protection: None   Other Topics Concern   • Not on file   Social History Narrative   • Not on file     Social Determinants of Health     Financial Resource Strain: Low Risk    • Difficulty of Paying Living Expenses: Not hard at all   Food Insecurity: Not on file   Transportation Needs: No Transportation Needs   • Lack of Transportation (Medical): No   • Lack of Transportation (Non-Medical): No   Physical Activity: Not on file   Stress: Not on file   Social Connections: Not on file   Intimate Partner Violence: Not on file   Housing Stability: Not on file         I have reviewed the past medical, surgical, social and family history, medications and allergies as documented in the EMR  Review of systems: ROS is negative other than that noted in the HPI  Constitutional: Negative for fatigue and fever  HENT: Negative for sore throat  Respiratory: Negative for shortness of breath  Cardiovascular: Negative for chest pain  Gastrointestinal: Negative for abdominal pain  Endocrine: Negative for cold intolerance and heat intolerance  Genitourinary: Negative for flank pain  Musculoskeletal: Negative for back pain  Skin: Negative for rash  Allergic/Immunologic: Negative for immunocompromised state  Neurological: Negative for dizziness  Psychiatric/Behavioral: Negative for agitation  Physical Exam:    General/Constitutional: NAD, well developed, well nourished  HENT: Normocephalic, atraumatic  CV: Intact distal pulses, regular rate  Resp: No respiratory distress or labored breathing  Lymphatic: No lymphadenopathy palpated  Neuro: Alert and Oriented x 3, no focal deficits  Psych: Normal mood, normal affect  Skin: Warm, dry, no rashes, no erythema      Shoulder Exam:      Inspection: No ecchymosis, edema, or deformity   No visualized wounds or skin lesions   Palpation: no AC joint or bicipital groove tenderness  Active Motion:  FF: 180  ER: 70  IR: T12  Strength: 5/5 FF in scapular plane, 4/5 ER,  5/5 IR with mild pain  Sensory - SILT in the Radial / Ulnar / Median / Axillary nerve distributions  Motor - AIN / PIN / Radial / Ulnar / Median / Axillary motor nerves in tact  Palpable Radial pulse  Cap refill <2secs in all digits    4/5 Empty Can Test with mild pain  [employer]

## 2023-02-21 ENCOUNTER — OFFICE VISIT (OUTPATIENT)
Dept: PHYSICAL THERAPY | Age: 68
End: 2023-02-21

## 2023-02-21 DIAGNOSIS — M17.11 PRIMARY OSTEOARTHRITIS OF RIGHT KNEE: ICD-10-CM

## 2023-02-21 DIAGNOSIS — M54.30 SCIATICA, UNSPECIFIED LATERALITY: ICD-10-CM

## 2023-02-21 DIAGNOSIS — M25.512 CHRONIC LEFT SHOULDER PAIN: ICD-10-CM

## 2023-02-21 DIAGNOSIS — M25.561 ACUTE PAIN OF RIGHT KNEE: Primary | ICD-10-CM

## 2023-02-21 DIAGNOSIS — G89.29 CHRONIC LEFT SHOULDER PAIN: ICD-10-CM

## 2023-02-21 NOTE — PROGRESS NOTES
PT Re-Evaluation     Today's date: 2023  Patient name: Mani Moe  : 1955  MRN: [id number]  Referring provider: Fawn Garcia  Dx:   Encounter Diagnosis     ICD-10-CM    1  Acute pain of right knee  M25 561       2  Chronic Leftt Shoulder Pain M25 512       3  Sciatica, unspecified laterality  M54 30 Ambulatory Referral to Physical Therapy          Start Time: 0800  Stop Time: 0845  Total time in clinic (min): 45 minutes    Assessment  Assessment details: Pt is a 73 y/o female who presents with R knee pain  Pt   Pt has a positive prognosis  Pt has done very well with knee pain and is now having shoulder pain for which she has a script for  POC will updated to include shoulder interventions  Pt would benefit from PT to address these impairments leading to increased functional capacity and improved quality of life  Impairments: abnormal or restricted ROM, impaired physical strength, lacks appropriate home exercise program, pain with function, poor posture  and poor body mechanics  Understanding of Dx/Px/POC: good   Prognosis: good    Plan  Patient would benefit from: skilled physical therapy  Planned modality interventions: cryotherapy and thermotherapy: hydrocollator packs  Planned therapy interventions: neuromuscular re-education, patient education, stretching, strengthening, therapeutic activities, therapeutic exercise, therapeutic training, home exercise program and graded activity  Frequency: Twice a week for 10 weeks  Treatment plan discussed with: patient      LE Goals - ALL MET    Short Term Goals: to be achieved by 4 weeks  1) Patient to be independent with basic HEP  2) Decrease pain to 2/10 at its worst   3) Increase knee ROM by 5-10 degrees   4) Increase LE strength by 1/2 MMT grade in all deficient planes      Long Term Goals: to be achieved by discharge  1) FOTO equal to or greater than 63   2) Ambulation to improve to maximal level of function  3) Stair negotiation will improve to reciprocal   4) Sit to stand transfers will improve to maximal level of function       UE Goals:  Short Term Goals: to be achieved by 4 weeks  1) Patient to be independent with basic HEP  2) Decrease pain to 2/10 at its worst   3) Increase shoulder ROM by 5-10 degrees in all planes  4) Increase shoulder strength by 1/2 MMT grade in all deficient planes  Long Term Goals: to be achieved by discharge  1) FOTO equal to or greater than 57   2) Patient to be independent with comprehensive HEP  3) Patient will demonstrate maximal over head reaching  4) Increase UE strength to 5/5 MMT grade in all planes to improve a/iadls  5) Patient to report no sleep interruption secondary to pain  Subjective Evaluation    History of Present Illness  Mechanism of injury: Pt reports flair up of sciatic nerve pain starting in 2022 which lead to altered gait pattern  On gina day woke up with terrible knee pain (no MAURICIO), and eventually went to ED on   Xrays negative  Was evaluated by orthopedist of 22, attributed pain to OA patient was given injection with mild relief  Patient is now presenting to PT with medial knee pain that waxes and weans  Walking is most painful and has stiffness after sitting long periods of time  Patient is active and usually walks 3-5 miles per day when not having knee pain  Patient also enjoys lifting weights and working out on yoga mat  Patient did mention older sister is struggling with her health and she has some responsibility as a caretaker     Quality of life: good    Pain  Current pain ratin  At best pain ratin  At worst pain ratin  Quality: sharp, radiating and dull ache  Aggravating factors: overhead activity, keyboarding and lifting    Hand dominance: right    Patient Goals  Patient goals for therapy: decreased pain, independence with ADLs/IADLs, return to sport/leisure activities, increased strength and increased motion          Objective     Tenderness     Right Knee   No tenderness in the medial joint line and pes anserinus       Active Range of Motion   Left Shoulder  Flexion: 135 degrees pain  Abudction: 115 degrees pain        Strength/Myotome Testing     Left shoulder   Planes of Motion   Flexion: 4-  Abduction: 4-  External rotation: 4-        Tests   + Painful arc, scapular assistance test, hansen-garrett test  Negative biceps load test           Precautions: N/A      Manuals             Inf glides                                                    Neuro Re-Ed             SL ER             Prone Y + T                                                                              Ther Ex             UBE             Pulleys             Standing cane flexion             Rail slides                                                                 Ther Activity                                       Gait Training                                       Modalities

## 2023-02-23 ENCOUNTER — OFFICE VISIT (OUTPATIENT)
Dept: PHYSICAL THERAPY | Age: 68
End: 2023-02-23

## 2023-02-23 DIAGNOSIS — M54.30 SCIATICA, UNSPECIFIED LATERALITY: ICD-10-CM

## 2023-02-23 DIAGNOSIS — G89.29 CHRONIC LEFT SHOULDER PAIN: ICD-10-CM

## 2023-02-23 DIAGNOSIS — M25.561 ACUTE PAIN OF RIGHT KNEE: Primary | ICD-10-CM

## 2023-02-23 DIAGNOSIS — M25.512 CHRONIC LEFT SHOULDER PAIN: ICD-10-CM

## 2023-02-23 DIAGNOSIS — M17.11 PRIMARY OSTEOARTHRITIS OF RIGHT KNEE: ICD-10-CM

## 2023-02-23 NOTE — PROGRESS NOTES
Daily Note     Today's date: 2023  Patient name: Darryl Aguilar  : 1955  MRN: [id number]  Referring provider: Sis Ordonez  Dx:   Encounter Diagnosis     ICD-10-CM    1  Acute pain of right knee  M25 561       2  Chronic left shoulder pain  M25 512     G89 29       3  Primary osteoarthritis of right knee  M17 11       4  Sciatica, unspecified laterality  M54 30           Start Time: 1100  Stop Time: 1145  Total time in clinic (min): 45 minutes    Subjective: Pt reports improvements in shoulder pain  Objective: See treatment diary below      Assessment: Tolerated treatment well  Pt's POC progressed to include more periscapular muscle activation interventions  Patient demonstrated fatigue post treatment and would benefit from continued PT      Plan: Continue per plan of care        Precautions: N/A          Manuals             Inf glides JF            thoracic PA JF                                      Neuro Re-Ed             SL ER             Prone Y + T 2*10 ea            Low rows 3*10 btb            LPD 3*10 btb            SA door slides 3*10 rtb                                      Ther Ex             UBE 3/3'            Pulleys 5'            Standing cane flexion             Rail slides                                                                 Ther Activity                                       Gait Training                                       Modalities

## 2023-02-28 ENCOUNTER — OFFICE VISIT (OUTPATIENT)
Dept: PHYSICAL THERAPY | Age: 68
End: 2023-02-28

## 2023-02-28 DIAGNOSIS — M17.11 PRIMARY OSTEOARTHRITIS OF RIGHT KNEE: ICD-10-CM

## 2023-02-28 DIAGNOSIS — I10 ESSENTIAL HYPERTENSION: ICD-10-CM

## 2023-02-28 DIAGNOSIS — M54.30 SCIATICA, UNSPECIFIED LATERALITY: ICD-10-CM

## 2023-02-28 DIAGNOSIS — M25.561 ACUTE PAIN OF RIGHT KNEE: Primary | ICD-10-CM

## 2023-02-28 DIAGNOSIS — M25.512 CHRONIC LEFT SHOULDER PAIN: ICD-10-CM

## 2023-02-28 DIAGNOSIS — G89.29 CHRONIC LEFT SHOULDER PAIN: ICD-10-CM

## 2023-02-28 RX ORDER — LISINOPRIL 10 MG/1
10 TABLET ORAL DAILY
Qty: 90 TABLET | Refills: 1 | Status: SHIPPED | OUTPATIENT
Start: 2023-02-28

## 2023-02-28 NOTE — PROGRESS NOTES
Daily Note     Today's date: 2023  Patient name: Glen Whalen  : 1955  MRN: [id number]  Referring provider: Kat Rosas  Dx:   Encounter Diagnosis     ICD-10-CM    1  Acute pain of right knee  M25 561       2  Chronic left shoulder pain  M25 512     G89 29       3  Primary osteoarthritis of right knee  M17 11       4  Sciatica, unspecified laterality  M54 30                      Subjective: Patient reports symptoms are the same  Having trouble with doing prone exercises on the bed because she feels like her balance is off, not sure if there's another way to do them  Objective: See treatment diary below      Assessment: Tolerated treatment well  Progression of periscapular strengthening with banded b/l ER and horizontal abduction  Verbal and tactile cues for proper muscle activation, demonstrated good form  Reviewed lifting form and simulated picking up granddaughter with box lifts, min pain reported with this activity but less compared to normal  Able to complete all reps  Patient would benefit from continued PT to further improve postural awareness  Plan: Progress treatment as tolerated         Precautions: N/A          Manuals            Inf glides JF CS           thoracic PA JF CS                                     Neuro Re-Ed             SL ER             Prone Y + T 2*10 ea 2*10 ea           Low rows 3*10 btb 15 5# eliud  3*10           LPD 3*10 btb 3*10 eliud 15 5#            SA door slides 3*10 rtb 3*10 rtb           Banded b/l ER  2x10 rtb           Banded horizontal abd  2x10 rtb           Ther Ex             UBE 3/3' 3'/3'           Pulleys 5'            Standing cane flexion             Rail slides             Seated thoracic ext  20x5"                                                  Ther Activity             Box lifts  17# x10                        Gait Training                                       Modalities

## 2023-03-02 ENCOUNTER — OFFICE VISIT (OUTPATIENT)
Dept: PHYSICAL THERAPY | Age: 68
End: 2023-03-02

## 2023-03-02 DIAGNOSIS — M25.561 ACUTE PAIN OF RIGHT KNEE: Primary | ICD-10-CM

## 2023-03-02 DIAGNOSIS — M17.11 PRIMARY OSTEOARTHRITIS OF RIGHT KNEE: ICD-10-CM

## 2023-03-02 DIAGNOSIS — G89.29 CHRONIC LEFT SHOULDER PAIN: ICD-10-CM

## 2023-03-02 DIAGNOSIS — M54.30 SCIATICA, UNSPECIFIED LATERALITY: ICD-10-CM

## 2023-03-02 DIAGNOSIS — M25.512 CHRONIC LEFT SHOULDER PAIN: ICD-10-CM

## 2023-03-02 NOTE — PROGRESS NOTES
Daily Note     Today's date: 3/2/2023  Patient name: Yolanda Funez  : 1955  MRN: [id number]  Referring provider: Isreal Monique  Dx:   Encounter Diagnosis     ICD-10-CM    1  Acute pain of right knee  M25 561       2  Chronic left shoulder pain  M25 512     G89 29       3  Primary osteoarthritis of right knee  M17 11       4  Sciatica, unspecified laterality  M54 30           Start Time: 1100  Stop Time: 1145  Total time in clinic (min): 45 minutes    Subjective: Pt reports no new symptoms  Objective: See treatment diary below      Assessment: Tolerated treatment well  Pt's POC was progressed to include more ER strengthening and HEP was modified appropriately  Patient demonstrated fatigue post treatment and would benefit from continued PT      Plan: Continue per plan of care        Precautions: N/A          Manuals 2/28 3/2           Inf glimishel LINARES JF           thoracic PA MILAGROS LINARES                                     Neuro Re-Ed             SL ER             Prone Y + T 2*10 ea 3*10 ea           Low rows 3*10 btb            LPD 3*10 btb             SA door slides 3*10 rtb 3*10 rtb           Banded b/l ER  3x10 gtb           Banded horizontal abd  2x10 rtb           Ther Ex             UBE 3/3' 3'/3'           Pulleys 5' 5'           Standing cane flexion             Doorway slides  Flex + abd 20*5"           Seated thoracic ext                                                    Ther Activity             Box lifts                          Gait Training                                       Modalities

## 2023-03-07 ENCOUNTER — OFFICE VISIT (OUTPATIENT)
Dept: PHYSICAL THERAPY | Age: 68
End: 2023-03-07

## 2023-03-07 DIAGNOSIS — G89.29 CHRONIC LEFT SHOULDER PAIN: ICD-10-CM

## 2023-03-07 DIAGNOSIS — M17.11 PRIMARY OSTEOARTHRITIS OF RIGHT KNEE: ICD-10-CM

## 2023-03-07 DIAGNOSIS — M25.561 ACUTE PAIN OF RIGHT KNEE: Primary | ICD-10-CM

## 2023-03-07 DIAGNOSIS — M54.30 SCIATICA, UNSPECIFIED LATERALITY: ICD-10-CM

## 2023-03-07 DIAGNOSIS — M25.512 CHRONIC LEFT SHOULDER PAIN: ICD-10-CM

## 2023-03-07 NOTE — PROGRESS NOTES
Daily Note     Today's date: 3/7/2023  Patient name: Maria Elena Kaminski  : 1955  MRN: [id number]  Referring provider: Giuseppe Mullins  Dx:   Encounter Diagnosis     ICD-10-CM    1  Acute pain of right knee  M25 561       2  Chronic left shoulder pain  M25 512     G89 29       3  Primary osteoarthritis of right knee  M17 11       4  Sciatica, unspecified laterality  M54 30           Start Time: 0800  Stop Time: 0845  Total time in clinic (min): 45 minutes    Subjective: Pt reports shoulder symptoms improved a little, but still feels the same  Objective: See treatment diary below      Assessment: Tolerated treatment well  Taping added positive results to pt's symptoms during overhead reaching and strengthening interventions  Patient exhibited good technique with therapeutic exercises and would benefit from continued PT      Plan: Continue per plan of care        Precautions: N/A          Manuals 2/28 3/2 3/7          Inf glides JF JF EL          thoracic PA JF JF EL          RTC support taping   JF                       Neuro Re-Ed             SL ER             Prone Y + T 2*10 ea 3*10 ea 3*10 ea          Low rows 3*10 btb  3x10 gtb          LPD 3*10 btb   3x10 btb          SA door slides 3*10 rtb 3*10 rtb 2x10 rtb          Banded b/l ER  3x10 gtb 2x10 gtb          Banded horizontal abd  2x10 rtb           Ther Ex             UBE 3/3' 3'/3'           Pulleys 5' 5' 5'          Standing cane flexion             Doorway slides  Flex + abd 20*5"           Seated thoracic ext                                                    Ther Activity             Box lifts                          Gait Training                                       Modalities

## 2023-03-09 ENCOUNTER — OFFICE VISIT (OUTPATIENT)
Dept: PHYSICAL THERAPY | Age: 68
End: 2023-03-09

## 2023-03-09 DIAGNOSIS — M17.11 PRIMARY OSTEOARTHRITIS OF RIGHT KNEE: ICD-10-CM

## 2023-03-09 DIAGNOSIS — G89.29 CHRONIC LEFT SHOULDER PAIN: ICD-10-CM

## 2023-03-09 DIAGNOSIS — M54.30 SCIATICA, UNSPECIFIED LATERALITY: ICD-10-CM

## 2023-03-09 DIAGNOSIS — M25.561 ACUTE PAIN OF RIGHT KNEE: Primary | ICD-10-CM

## 2023-03-09 DIAGNOSIS — M25.512 CHRONIC LEFT SHOULDER PAIN: ICD-10-CM

## 2023-03-09 NOTE — PROGRESS NOTES
Daily Note     Today's date: 3/9/2023  Patient name: Imtiaz Rothman  : 1955  MRN: [id number]  Referring provider: Ceci Marquez  Dx:   Encounter Diagnosis     ICD-10-CM    1  Acute pain of right knee  M25 561       2  Chronic left shoulder pain  M25 512     G89 29       3  Primary osteoarthritis of right knee  M17 11       4  Sciatica, unspecified laterality  M54 30           Start Time: 0800  Stop Time: 0845  Total time in clinic (min): 45 minutes    Subjective: Pt reports improvement in symptoms and felt tape improved support  Objective: See treatment diary below      Assessment: Tolerated treatment well  Pt did well with inferior glides and shoulder ROM improved w/ mvmt through PROM  Pt will be progressed next visit to weighted active ROM such as box carries/lifts to improve ability to  grandson  Patient exhibited good technique with therapeutic exercises and would benefit from continued PT      Plan: Continue per plan of care        Precautions: N/A          Manuals 2/28 3/2 3/7 3/9         Inf glides JF JF EL EL         thoracic PA JF JF EL EL         RTC support taping   JF                       Neuro Re-Ed             SL ER             Prone Y + T 2*10 ea 3*10 ea 3*10 ea 3*10 ea         Low rows 3*10 btb  3x10 gtb 3x10 btb         LPD 3*10 btb   3x10 btb 3x10 btb         SA door slides 3*10 rtb 3*10 rtb 2x10 rtb 3x10 rtb         Banded b/l ER  3x10 gtb 2x10 gtb 310 gtb         Banded horizontal abd  2x10 rtb           Ther Ex             UBE 3/3' 3'/3'           Pulleys 5' 5' 5' 5'         Standing cane flexion             Doorway slides  Flex + abd 20*5"  Flex  20x5"         Seated thoracic ext                                                    Ther Activity             Box lifts                          Gait Training                                       Modalities

## 2023-03-14 ENCOUNTER — OFFICE VISIT (OUTPATIENT)
Dept: PHYSICAL THERAPY | Age: 68
End: 2023-03-14

## 2023-03-14 DIAGNOSIS — M54.30 SCIATICA, UNSPECIFIED LATERALITY: ICD-10-CM

## 2023-03-14 DIAGNOSIS — M25.512 CHRONIC LEFT SHOULDER PAIN: ICD-10-CM

## 2023-03-14 DIAGNOSIS — M25.561 ACUTE PAIN OF RIGHT KNEE: Primary | ICD-10-CM

## 2023-03-14 DIAGNOSIS — G89.29 CHRONIC LEFT SHOULDER PAIN: ICD-10-CM

## 2023-03-14 DIAGNOSIS — M17.11 PRIMARY OSTEOARTHRITIS OF RIGHT KNEE: ICD-10-CM

## 2023-03-14 NOTE — PROGRESS NOTES
Daily Note     Today's date: 3/14/2023  Patient name: Julianne Ramírez  : 1955  MRN: [id number]  Referring provider: Mary Andersen  Dx:   Encounter Diagnosis     ICD-10-CM    1  Acute pain of right knee  M25 561       2  Chronic left shoulder pain  M25 512     G89 29       3  Primary osteoarthritis of right knee  M17 11       4  Sciatica, unspecified laterality  M54 30           Start Time: 0800  Stop Time: 0845  Total time in clinic (min): 45 minutes    Subjective: Pt has pain reaching into back pocket and end range flexion  Objective: See treatment diary below      Assessment: Tolerated treatment well  Pt's symptoms improved w/ behind the back w/ strap stretching  Pt reported no pain at end range flexion after scapular mobs  Pt's POC focused on strengthening scapular muscles  Patient exhibited good technique with therapeutic exercises and would benefit from continued PT      Plan: Continue per plan of care        Precautions: N/A          Manuals 2/28 3/2 3/7 3/9 3/14        Inf glides JF JF EL EL EL        thoracic PA JF JF EL EL         RTC support taping   JF          Behind back w/ strap stretch     JF        Scapular Mobs     EL        Neuro Re-Ed             SL ER             Prone Y + T 2*10 ea 3*10 ea 3*10 ea 3*10 ea 2x10 ea        Low rows 3*10 btb  3x10 gtb 3x10 btb 3x10 btb        LPD 3*10 btb   3x10 btb 3x10 btb         SA door slides 3*10 rtb 3*10 rtb 2x10 rtb 3x10 rtb 3x10 rtb        Banded b/l ER  3x10 gtb 2x10 gtb 3x10 gtb 3x10 gtb        Banded horizontal abd  2x10 rtb           Ther Ex             UBE 3/3' 3'/3'           Pulleys 5' 5' 5' 5' 5'        Standing cane flexion             Doorway slides  Flex + abd 20*5"  Flex  20x5" Flex 20x5"        Seated thoracic ext                                                    Ther Activity             Box lifts                          Gait Training                                       Modalities

## 2023-03-16 ENCOUNTER — OFFICE VISIT (OUTPATIENT)
Dept: PHYSICAL THERAPY | Age: 68
End: 2023-03-16

## 2023-03-16 DIAGNOSIS — M25.561 ACUTE PAIN OF RIGHT KNEE: Primary | ICD-10-CM

## 2023-03-16 DIAGNOSIS — G89.29 CHRONIC LEFT SHOULDER PAIN: ICD-10-CM

## 2023-03-16 DIAGNOSIS — M17.11 PRIMARY OSTEOARTHRITIS OF RIGHT KNEE: ICD-10-CM

## 2023-03-16 DIAGNOSIS — M54.30 SCIATICA, UNSPECIFIED LATERALITY: ICD-10-CM

## 2023-03-16 DIAGNOSIS — M25.512 CHRONIC LEFT SHOULDER PAIN: ICD-10-CM

## 2023-03-16 NOTE — PROGRESS NOTES
Daily Note     Today's date: 3/16/2023  Patient name: Janine Leahy  : 1955  MRN: [id number]  Referring provider: Patrizia Davenport  Dx:   Encounter Diagnosis     ICD-10-CM    1  Acute pain of right knee  M25 561       2  Chronic left shoulder pain  M25 512     G89 29       3  Primary osteoarthritis of right knee  M17 11       4  Sciatica, unspecified laterality  M54 30                      Subjective: Pt reports shoulder symptoms are a little worse and wasn't able to do HEP yesterday  Objective: See treatment diary below      Assessment: Tolerated treatment well  Pt reported pain w/ prone Ts and doorway slides in anterior portion of the shoulder  Pt demonstrated improved IR ROM w/ no pain during behind back w/ strap stretch  Patient exhibited good technique with therapeutic exercises and would benefit from continued PT      Plan: Continue per plan of care        Precautions: N/A    Manuals 2/28 3/2 3/7 3/9 3/14 3/16       Inf glides JF JF EL EL EL EL       thoracic PA JF JF EL EL         RTC support taping   JF          Behind back w/ strap stretch     JF        Scapular Mobs     EL EL       Neuro Re-Ed             SL ER             Prone Y + T 2*10 ea 3*10 ea 3*10 ea 3*10 ea 2x10 ea 3x10 ea       Low rows 3*10 btb  3x10 gtb 3x10 btb 3x10 btb 3x10 btb       LPD 3*10 btb   3x10 btb 3x10 btb  3x10 btb       SA door slides 3*10 rtb 3*10 rtb 2x10 rtb 3x10 rtb 3x10 rtb 3x10 rtb       Banded b/l ER  3x10 gtb 2x10 gtb 3x10 gtb 3x10 gtb 3x10 btb       Banded horizontal abd  2x10 rtb           Ther Ex             UBE 3/3' 3'/3'           Pulleys 5' 5' 5' 5' 5' 5'       Standing cane flexion             Doorway slides  Flex + abd 20*5"  Flex  20x5" Flex 20x5" Flex 20x5"       Seated thoracic ext             Behind back w/ strap stretch      10x10"                                 Ther Activity             Box lifts                          Gait Training                                       Modalities

## 2023-03-21 ENCOUNTER — OFFICE VISIT (OUTPATIENT)
Dept: PHYSICAL THERAPY | Age: 68
End: 2023-03-21

## 2023-03-21 DIAGNOSIS — M54.30 SCIATICA, UNSPECIFIED LATERALITY: ICD-10-CM

## 2023-03-21 DIAGNOSIS — M17.11 PRIMARY OSTEOARTHRITIS OF RIGHT KNEE: ICD-10-CM

## 2023-03-21 DIAGNOSIS — M25.512 CHRONIC LEFT SHOULDER PAIN: ICD-10-CM

## 2023-03-21 DIAGNOSIS — G89.29 CHRONIC LEFT SHOULDER PAIN: ICD-10-CM

## 2023-03-21 DIAGNOSIS — M25.561 ACUTE PAIN OF RIGHT KNEE: Primary | ICD-10-CM

## 2023-03-21 NOTE — PROGRESS NOTES
Daily Note     Today's date: 3/21/2023  Patient name: Maria D Manuel  : 1955  MRN: [id number]  Referring provider: Chapincito Mae  Dx:   Encounter Diagnosis     ICD-10-CM    1  Acute pain of right knee  M25 561       2  Chronic left shoulder pain  M25 512     G89 29       3  Primary osteoarthritis of right knee  M17 11       4  Sciatica, unspecified laterality  M54 30                      Subjective: Pt reports that her sciatia is bothering her  Her shoulder is bothering her from picking up her granddaughter so much  Objective: See treatment diary below      Assessment: Pt continued to have pain at end range with AROM which reduced with scapular repositioning  Instructed pt on postural correction and how it will improve the [address] mechanics  Integrated thoracic extension over a chair to improve thoracic mobility  She did require cues to inhibit UT compensations and to facilitate middle and low trap  Continue to progress as able  Plan: Continue per plan of care  Progress treatment as tolerated         Precautions: N/A    Manuals 2/28 3/2 3/7 3/9 3/14 3/16 3/21      Inf glides JF JF EL EL EL EL EL      thoracic PA JF JF EL EL   EL       RTC support taping   JF          Behind back w/ strap stretch     JF        Scapular Mobs     EL EL EL      Neuro Re-Ed             SL ER             Prone Y + T 2*10 ea 3*10 ea 3*10 ea 3*10 ea 2x10 ea 3x10 ea 3x10 ea      Low rows 3*10 btb  3x10 gtb 3x10 btb 3x10 btb 3x10 btb 3x10 btb      LPD 3*10 btb   3x10 btb 3x10 btb  3x10 btb 3x10 btb      SA door slides 3*10 rtb 3*10 rtb 2x10 rtb 3x10 rtb 3x10 rtb 3x10 rtb 3x10 rtb c foam roll       Banded b/l ER  3x10 gtb 2x10 gtb 3x10 gtb 3x10 gtb 3x10 btb 3x10 gtb      Banded horizontal abd  2x10 rtb           Ther Ex             UBE 3/3' 3'/3'           Pulleys 5' 5' 5' 5' 5' 5' 5'      Standing cane flexion             Doorway slides  Flex + abd 20*5"  Flex  20x5" Flex 20x5" Flex 20x5"       Seated thoracic ext       5s x10      Behind back w/ strap stretch      10x10" 10x10"                                Ther Activity             Box lifts                          Gait Training                                       Modalities

## 2023-03-23 ENCOUNTER — OFFICE VISIT (OUTPATIENT)
Dept: PHYSICAL THERAPY | Age: 68
End: 2023-03-23

## 2023-03-23 DIAGNOSIS — M25.512 CHRONIC LEFT SHOULDER PAIN: ICD-10-CM

## 2023-03-23 DIAGNOSIS — M17.11 PRIMARY OSTEOARTHRITIS OF RIGHT KNEE: ICD-10-CM

## 2023-03-23 DIAGNOSIS — M54.30 SCIATICA, UNSPECIFIED LATERALITY: ICD-10-CM

## 2023-03-23 DIAGNOSIS — G89.29 CHRONIC LEFT SHOULDER PAIN: ICD-10-CM

## 2023-03-23 DIAGNOSIS — M25.561 ACUTE PAIN OF RIGHT KNEE: Primary | ICD-10-CM

## 2023-03-23 NOTE — PROGRESS NOTES
Daily Note     Today's date: 3/23/2023  Patient name: Cony Osuna  : 1955  MRN: [id number]  Referring provider: Veronica Jones  Dx:   Encounter Diagnosis     ICD-10-CM    1  Acute pain of right knee  M25 561       2  Chronic left shoulder pain  M25 512     G89 29       3  Primary osteoarthritis of right knee  M17 11       4  Sciatica, unspecified laterality  M54 30           Start Time: 0800  Stop Time: 0845  Total time in clinic (min): 45 minutes    Subjective: Pt reports the shoulder symptoms have been improving, but has sciatic symptoms in both sides  Objective: See treatment diary below      Assessment: Tolerated treatment well  Pt had sciatic nerve pain so POC focused on improving sciatic nerve pain and LBP  Pt was sent home with sciatic nerve glides and prone press ups as a HEP  Patient exhibited good technique with therapeutic exercises and would benefit from continued PT      Plan: Continue per plan of care        Precautions: N/A    Manuals 2/28 3/2 3/7 3/9 3/14 3/16 3/21 3/23     Inf glides JF JF EL EL EL EL EL      thoracic PA JF JF EL EL   EL  EL     RTC support taping   JF          Behind back w/ strap stretch     JF        Sciatic nerve glides        EL     Lumbar PA        EL     Scapular Mobs     EL EL EL      Neuro Re-Ed             SL ER             Prone Y + T 2*10 ea 3*10 ea 3*10 ea 3*10 ea 2x10 ea 3x10 ea 3x10 ea      Low rows 3*10 btb  3x10 gtb 3x10 btb 3x10 btb 3x10 btb 3x10 btb      LPD 3*10 btb   3x10 btb 3x10 btb  3x10 btb 3x10 btb      SA door slides 3*10 rtb 3*10 rtb 2x10 rtb 3x10 rtb 3x10 rtb 3x10 rtb 3x10 rtb c foam roll       Banded b/l ER  3x10 gtb 2x10 gtb 3x10 gtb 3x10 gtb 3x10 btb 3x10 gtb      Banded horizontal abd  2x10 rtb           Ther Ex             UBE 3/3' 3'/3'           Pulleys 5' 5' 5' 5' 5' 5' 5' 5'     Standing cane flexion             Doorway slides  Flex + abd 20*5"  Flex  20x5" Flex 20x5" Flex 20x5"       Seated thoracic ext       5s x10 Behind back w/ strap stretch      10x10" 10x10"      Prone Press-ups        2x10  HEP     Sciatic nerve glides        2x15  HEP     Ther Activity             Box lifts                          Gait Training                                       Modalities

## 2023-03-28 ENCOUNTER — OFFICE VISIT (OUTPATIENT)
Dept: PHYSICAL THERAPY | Age: 68
End: 2023-03-28

## 2023-03-28 DIAGNOSIS — M25.512 CHRONIC LEFT SHOULDER PAIN: ICD-10-CM

## 2023-03-28 DIAGNOSIS — M17.11 PRIMARY OSTEOARTHRITIS OF RIGHT KNEE: ICD-10-CM

## 2023-03-28 DIAGNOSIS — M25.561 ACUTE PAIN OF RIGHT KNEE: Primary | ICD-10-CM

## 2023-03-28 DIAGNOSIS — M54.30 SCIATICA, UNSPECIFIED LATERALITY: ICD-10-CM

## 2023-03-28 DIAGNOSIS — G89.29 CHRONIC LEFT SHOULDER PAIN: ICD-10-CM

## 2023-03-28 NOTE — PROGRESS NOTES
Daily Note     Today's date: 3/28/2023  Patient name: Manan Mendoza  : 1955  MRN: [id number]  Referring provider: Padmini Kessler  Dx:   Encounter Diagnosis     ICD-10-CM    1  Acute pain of right knee  M25 561       2  Chronic left shoulder pain  M25 512     G89 29       3  Primary osteoarthritis of right knee  M17 11       4  Sciatica, unspecified laterality  M54 30           Start Time: 0800  Stop Time: 0845  Total time in clinic (min): 45 minutes    Subjective: Pt reports no new symptoms  Objective: See treatment diary below      Assessment: Tolerated treatment well  Pt's POC was progressed to include more RTC and periscapular strengthening to increase tolerance to ADL's  Patient demonstrated fatigue post treatment and would benefit from continued PT      Plan: Continue per plan of care        Precautions: N/A    Manuals 3/28            Inf glides JF            thoracic PA             RTC support taping             Behind back w/ strap stretch             Sciatic nerve glides             Lumbar PA             Scapular Mobs             Neuro Re-Ed             Standing banded flexion and abd ytb 2x10            Prone Y + T 3*10 ea 1# b/l            Low rows 3*10 btb            LPD 3*10 btb            Elaine arms 3*10 rtb            Banded IR/ER rtb 3x10            Banded horizontal abd             Ther Ex             UBE 3/3'            Pulleys 5'            Standing cane flexion             Doorway slides             Seated thoracic ext             Behind back w/ strap stretch             Prone Press-ups             Sciatic nerve glides             Ther Activity             Box lifts                          Gait Training                                       Modalities

## 2023-03-30 ENCOUNTER — OFFICE VISIT (OUTPATIENT)
Dept: PHYSICAL THERAPY | Age: 68
End: 2023-03-30

## 2023-03-30 DIAGNOSIS — M17.11 PRIMARY OSTEOARTHRITIS OF RIGHT KNEE: ICD-10-CM

## 2023-03-30 DIAGNOSIS — M25.561 ACUTE PAIN OF RIGHT KNEE: Primary | ICD-10-CM

## 2023-03-30 DIAGNOSIS — M25.512 CHRONIC LEFT SHOULDER PAIN: ICD-10-CM

## 2023-03-30 DIAGNOSIS — G89.29 CHRONIC LEFT SHOULDER PAIN: ICD-10-CM

## 2023-03-30 DIAGNOSIS — M54.30 SCIATICA, UNSPECIFIED LATERALITY: ICD-10-CM

## 2023-03-30 NOTE — PROGRESS NOTES
Daily Note     Today's date: 3/30/2023  Patient name: Vinnie Cazares  : 1955  MRN: [id number]  Referring provider: Lynn Diaz  Dx:   Encounter Diagnosis     ICD-10-CM    1  Acute pain of right knee  M25 561       2  Chronic left shoulder pain  M25 512     G89 29       3  Primary osteoarthritis of right knee  M17 11       4  Sciatica, unspecified laterality  M54 30           Start Time: 0800  Stop Time: 0840  Total time in clinic (min): 40 minutes    Subjective: Pt reports increased symptoms after 4 mile walk on Tuesday  Objective: See treatment diary below      Assessment: Tolerated treatment well  Patient has seen slight functional improvements in symptoms, but is still having pain and weakness in the shoulder  Plan of care has focused on rotator cuff + periscapular strengthening to improve [address] rhythm and stability  Deferring to orthopedic team to determine best plan of action moving forward  Patient demonstrated fatigue post treatment and would benefit from continued PT      Plan: Continue per plan of care        Precautions: N/A    Manuals 3/30            Inf glides JF            thoracic PA JF            RTC support taping             Behind back w/ strap stretch             Sciatic nerve glides             Lumbar PA             Scapular Mobs             Neuro Re-Ed 3/30            Standing banded flexion and abd ytb 2x10            Prone Y + T 3*10 ea 1# b/l            Low rows 3*10 btb            LPD 3*10 btb            Elaine arms 3*10 rtb            Banded IR/ER rtb 3x10            Banded horizontal abd             Ther Ex             UBE 3/3'            Pulleys 5'            Standing cane flexion             Doorway slides             Seated thoracic ext             Behind back w/ strap stretch             Prone Press-ups             Sciatic nerve glides             Ther Activity             Box lifts                          Gait Training                                       Modalities

## 2023-04-04 ENCOUNTER — OFFICE VISIT (OUTPATIENT)
Dept: PHYSICAL THERAPY | Age: 68
End: 2023-04-04

## 2023-04-04 ENCOUNTER — OFFICE VISIT (OUTPATIENT)
Dept: OBGYN CLINIC | Facility: CLINIC | Age: 68
End: 2023-04-04

## 2023-04-04 VITALS
HEIGHT: 67 IN | SYSTOLIC BLOOD PRESSURE: 157 MMHG | DIASTOLIC BLOOD PRESSURE: 92 MMHG | BODY MASS INDEX: 30.13 KG/M2 | WEIGHT: 192 LBS | HEART RATE: 70 BPM

## 2023-04-04 DIAGNOSIS — G89.29 CHRONIC LEFT SHOULDER PAIN: ICD-10-CM

## 2023-04-04 DIAGNOSIS — M75.82 ROTATOR CUFF TENDINITIS, LEFT: Primary | ICD-10-CM

## 2023-04-04 DIAGNOSIS — M25.512 CHRONIC LEFT SHOULDER PAIN: ICD-10-CM

## 2023-04-04 DIAGNOSIS — M25.561 ACUTE PAIN OF RIGHT KNEE: Primary | ICD-10-CM

## 2023-04-04 DIAGNOSIS — M54.30 SCIATICA, UNSPECIFIED LATERALITY: ICD-10-CM

## 2023-04-04 DIAGNOSIS — M17.11 PRIMARY OSTEOARTHRITIS OF RIGHT KNEE: ICD-10-CM

## 2023-04-04 NOTE — PROGRESS NOTES
Ortho Sports Medicine Follow Up Patient Visit     Assesment:   74 y o  female with left rotator cuff tear    Plan:    The patient has undergone a full course of physical therapy  Shoulder pain has slightly improved, but she continues to have significant pain with ADLs and today she has some weakness on exam  I recommended an MRI to evaluate her rotator cuff  I will see her back for review of MRI after it is completed  For the knee, she is doing better over all  She will continue PT and activity as tolerated  She will continue NSAIDs and a compression sleeve as needed for pain  We can continue to assess and consider additional options for injections or other treatment if symptoms worsen in the future  History of Present Illness: The patient is a 74 y o  female presenting for follow up of right knee osteoarthritis and chronic left shoulder pain  Knee doing ok  Pain is worse after days of heavy activity  No sharp acute pain during activity  She has done a full course of PT for the shoulder  Symptoms are improved but she continues to have pain with lfiting or reaching activities  This is affected her ability to perform all her ADLs  Denies numbness or tingling         Shoulder Surgical History:  None    Past Medical, Social and Family History:  Past Medical History:   Diagnosis Date   • Hypertension    • Lymphadenopathy     LAST ASSESSED [id number]     Past Surgical History:   Procedure Laterality Date   • REDUCTION MAMMAPLASTY     • TONSILLECTOMY       No Known Allergies  Current Outpatient Medications on File Prior to Visit   Medication Sig Dispense Refill   • atorvastatin (LIPITOR) 20 mg tablet Take 1 tablet (20 mg total) by mouth daily 90 tablet 3   • benzonatate (TESSALON PERLES) 100 mg capsule Take 1 capsule (100 mg total) by mouth 3 (three) times a day as needed for cough (Patient not taking: Reported on 12/28/2022) 20 capsule 0   • Calcium Carbonate-Vitamin D 600-400 MG-UNIT per tablet Take 1 tablet by mouth daily     • cetirizine (ZyrTEC) 5 MG chewable tablet Chew 5 mg as needed     • clotrimazole-betamethasone (LOTRISONE) 1-0 05 % cream Apply topically 2 (two) times a day 30 g 0   • cycloSPORINE (Restasis) 0 05 % ophthalmic emulsion Administer 1 drop to both eyes as needed (dry eyes) 90 mL 2   • Diclofenac Sodium (VOLTAREN) 1 % Apply 2 g topically 4 (four) times a day 100 g 0   • fluticasone (FLONASE) 50 mcg/act nasal spray 2 sprays into each nostril daily 9 9 g 0   • lisinopril (ZESTRIL) 10 mg tablet Take 1 tablet (10 mg total) by mouth daily 90 tablet 1   • methylPREDNISolone 4 MG tablet therapy pack Use as directed on package 21 each 0   • Multiple Vitamins-Minerals (MULTIVITAMIN ADULT PO) Take 1 tablet by mouth daily     • nadolol (CORGARD) 20 mg tablet Take 1 5 tablets (30 mg total) by mouth daily at bedtime 135 tablet 1   • sodium chloride (OCEAN) 0 65 % nasal spray 1 spray into each nostril as needed for rhinitis for up to 30 days 10 mL 0   • valACYclovir (VALTREX) 1,000 mg tablet Take 1 tablet (1,000 mg total) by mouth 2 (two) times a day for 14 days 90 tablet 3     No current facility-administered medications on file prior to visit  Social History     Socioeconomic History   • Marital status: /Civil Union     Spouse name: Not on file   • Number of children: Not on file   • Years of education: Not on file   • Highest education level: Not on file   Occupational History   • Not on file   Tobacco Use   • Smoking status: Never   • Smokeless tobacco: Never   Substance and Sexual Activity   • Alcohol use:  Yes     Alcohol/week: 1 0 standard drink     Types: 1 Cans of beer per week     Comment: social   • Drug use: No   • Sexual activity: Yes     Partners: Male     Birth control/protection: None   Other Topics Concern   • Not on file   Social History Narrative   • Not on file     Social Determinants of Health     Financial Resource Strain: Low Risk    • Difficulty of Paying Living Expenses: Not hard at all   Food Insecurity: Not on file   Transportation Needs: No Transportation Needs   • Lack of Transportation (Medical): No   • Lack of Transportation (Non-Medical): No   Physical Activity: Not on file   Stress: Not on file   Social Connections: Not on file   Intimate Partner Violence: Not on file   Housing Stability: Not on file         I have reviewed the past medical, surgical, social and family history, medications and allergies as documented in the EMR  Review of systems: ROS is negative other than that noted in the HPI  Constitutional: Negative for fatigue and fever  HENT: Negative for sore throat  Respiratory: Negative for shortness of breath  Cardiovascular: Negative for chest pain  Gastrointestinal: Negative for abdominal pain  Endocrine: Negative for cold intolerance and heat intolerance  Genitourinary: Negative for flank pain  Musculoskeletal: Negative for back pain  Skin: Negative for rash  Allergic/Immunologic: Negative for immunocompromised state  Neurological: Negative for dizziness  Psychiatric/Behavioral: Negative for agitation  Physical Exam:    General/Constitutional: NAD, well developed, well nourished  HENT: Normocephalic, atraumatic  CV: Intact distal pulses, regular rate  Resp: No respiratory distress or labored breathing  Lymphatic: No lymphadenopathy palpated  Neuro: Alert and Oriented x 3, no focal deficits  Psych: Normal mood, normal affect  Skin: Warm, dry, no rashes, no erythema      Shoulder Exam:      Inspection: No ecchymosis, edema, or deformity   No visualized wounds or skin lesions   Palpation: no AC joint or bicipital groove tenderness  Active Motion:  FF: 180  ER: 70  IR: T12  Strength: 4/5 FF in scapular plane, 4/5 ER,  5/5 IR with mild pain  Sensory - SILT in the Radial / Ulnar / Median / Axillary nerve distributions  Motor - AIN / PIN / Radial / Ulnar / Median / Axillary motor nerves in tact  Palpable Radial pulse  Cap refill <2secs in all digits    4/5 Empty Can Test with mild pain  [employer]

## 2023-04-04 NOTE — PROGRESS NOTES
PT Re-Evaluation     Today's date: 2023  Patient name: Jose Armando Daigle  : 1955  MRN: [id number]  Referring provider: Artie Zambrano  Dx:   Encounter Diagnosis     ICD-10-CM    1  Acute pain of right knee  M25 561       2  Chronic Leftt Shoulder Pain M25 512       3  Sciatica, unspecified laterality  M54 30 Ambulatory Referral to Physical Therapy          Start Time: 0800  Stop Time: 0845  Total time in clinic (min): 45 minutes    Assessment  Assessment details: Pt is a 73 y/o female who presents with R knee pain and left shoulder pain  Pt has seen functional progress in knee with minimal symptoms  Patient has seen slight functional improvements in symptoms, but is still having pain and weakness in the shoulder  Plan of care has focused on rotator cuff + periscapular strengthening to improve [address] rhythm and stability  Deferring to orthopedic team to determine best plan of care moving forward  Patient encouraged to return for re-assessment based on MRI results  Pt will be discharged at this time with HEP  Plan  Patient would benefit from: D/C PT      LE Goals - ALL MET    Short Term Goals: to be achieved by 4 weeks  1) Patient to be independent with basic HEP  2) Decrease pain to 2/10 at its worst   3) Increase knee ROM by 5-10 degrees   4) Increase LE strength by 1/2 MMT grade in all deficient planes  Long Term Goals: to be achieved by discharge  1) FOTO equal to or greater than 63   2) Ambulation to improve to maximal level of function  3) Stair negotiation will improve to reciprocal   4) Sit to stand transfers will improve to maximal level of function       UE Goals: Partially met   Short Term Goals: to be achieved by 4 weeks  1) Patient to be independent with basic HEP  2) Decrease pain to 2/10 at its worst   3) Increase shoulder ROM by 5-10 degrees in all planes  4) Increase shoulder strength by 1/2 MMT grade in all deficient planes      Long Term Goals: to be achieved by discharge  1) FOTO equal to or greater than 57   2) Patient to be independent with comprehensive HEP  3) Patient will demonstrate maximal over head reaching  4) Increase UE strength to 5/5 MMT grade in all planes to improve a/iadls  5) Patient to report no sleep interruption secondary to pain  Patient goals:  Partially met    Subjective Evaluation    History of Present Illness  Mechanism of injury: Pt reports flair up of sciatic nerve pain starting in 2022 which lead to altered gait pattern  On gina day woke up with terrible knee pain (no MAURICIO), and eventually went to ED on   Xrays negative  Was evaluated by orthopedist of 22, attributed pain to OA patient was given injection with mild relief  Patient is now presenting to PT with medial knee pain that waxes and weans  Walking is most painful and has stiffness after sitting long periods of time  Patient is active and usually walks 3-5 miles per day when not having knee pain  Patient also enjoys lifting weights and working out on yoga mat  Patient did mention older sister is struggling with her health and she has some responsibility as a caretaker  Quality of life: good    Pain  Current pain ratin  At best pain ratin  At worst pain ratin  Quality: sharp, radiating and dull ache  Aggravating factors: overhead activity, keyboarding and lifting    Hand dominance: right    Patient Goals  Patient goals for therapy: decreased pain, independence with ADLs/IADLs, return to sport/leisure activities, increased strength and increased motion          Objective     Tenderness     Right Knee   No tenderness in the medial joint line and pes anserinus       Active Range of Motion   Left Shoulder  Flexion: 135 degrees pain  Abudction: 115 degrees pain        Strength/Myotome Testing     Left shoulder   Planes of Motion   Flexion: 4  Abduction: 4  External rotation: 4        Tests   + Painful arc, scapular assistance test, hansen-garrett test  Negative biceps load test           Precautions: N/A      Manuals 4/3            Inf glides JF            thoracic PA JF            RTC support taping             Behind back w/ strap stretch             Sciatic nerve glides             Lumbar PA             Scapular Mobs             Neuro Re-Ed 4/3            Standing banded flexion and abd ytb 2x10            Prone Y + T 3*10 ea 1# b/l            Low rows 3*10 btb            LPD 3*10 btb            Elaine arms 3*10 rtb            Banded IR/ER rtb 3x10            Banded horizontal abd             Ther Ex             UBE 3/3'            Pulleys 5'            Standing cane flexion             Doorway slides             Seated thoracic ext             Behind back w/ strap stretch             Prone Press-ups             Sciatic nerve glides             Ther Activity             Box lifts                          Gait Training                                       Modalities

## 2023-04-04 NOTE — PROGRESS NOTES
PT Re-Evaluation     Today's date: 2023  Patient name: Jt Rowan  : 1955  MRN: [id number]  Referring provider: Corrine Felty  Dx:   Encounter Diagnosis     ICD-10-CM    1  Acute pain of right knee  M25 561       2  Chronic Leftt Shoulder Pain M25 512       3  Sciatica, unspecified laterality  M54 30 Ambulatory Referral to Physical Therapy          Start Time: 0800  Stop Time: 0845  Total time in clinic (min): 45 minutes    Assessment  Assessment details: Pt is a 73 y/o female who presents with R knee pain  Pt   Pt has a positive prognosis  Pt has done very well with knee pain and is now having shoulder pain for which she has a script for  POC will updated to include shoulder interventions  Pt would benefit from PT to address these impairments leading to increased functional capacity and improved quality of life  Impairments: abnormal or restricted ROM, impaired physical strength, lacks appropriate home exercise program, pain with function, poor posture  and poor body mechanics  Understanding of Dx/Px/POC: good   Prognosis: good    Plan  Patient would benefit from: skilled physical therapy  Planned modality interventions: cryotherapy and thermotherapy: hydrocollator packs  Planned therapy interventions: neuromuscular re-education, patient education, stretching, strengthening, therapeutic activities, therapeutic exercise, therapeutic training, home exercise program and graded activity  Frequency: Twice a week for 10 weeks  Treatment plan discussed with: patient      LE Goals - ALL MET    Short Term Goals: to be achieved by 4 weeks  1) Patient to be independent with basic HEP  2) Decrease pain to 2/10 at its worst   3) Increase knee ROM by 5-10 degrees   4) Increase LE strength by 1/2 MMT grade in all deficient planes      Long Term Goals: to be achieved by discharge  1) FOTO equal to or greater than 63   2) Ambulation to improve to maximal level of function  3) Stair negotiation will improve to reciprocal   4) Sit to stand transfers will improve to maximal level of function       UE Goals:  Short Term Goals: to be achieved by 4 weeks  1) Patient to be independent with basic HEP  2) Decrease pain to 2/10 at its worst   3) Increase shoulder ROM by 5-10 degrees in all planes  4) Increase shoulder strength by 1/2 MMT grade in all deficient planes  Long Term Goals: to be achieved by discharge  1) FOTO equal to or greater than 57   2) Patient to be independent with comprehensive HEP  3) Patient will demonstrate maximal over head reaching  4) Increase UE strength to 5/5 MMT grade in all planes to improve a/iadls  5) Patient to report no sleep interruption secondary to pain  Subjective Evaluation    History of Present Illness  Mechanism of injury: Pt reports flair up of sciatic nerve pain starting in 2022 which lead to altered gait pattern  On gina day woke up with terrible knee pain (no MAURICIO), and eventually went to ED on   Xrays negative  Was evaluated by orthopedist of 22, attributed pain to OA patient was given injection with mild relief  Patient is now presenting to PT with medial knee pain that waxes and weans  Walking is most painful and has stiffness after sitting long periods of time  Patient is active and usually walks 3-5 miles per day when not having knee pain  Patient also enjoys lifting weights and working out on yoga mat  Patient did mention older sister is struggling with her health and she has some responsibility as a caretaker     Quality of life: good    Pain  Current pain ratin  At best pain ratin  At worst pain ratin  Quality: sharp, radiating and dull ache  Aggravating factors: overhead activity, keyboarding and lifting    Hand dominance: right    Patient Goals  Patient goals for therapy: decreased pain, independence with ADLs/IADLs, return to sport/leisure activities, increased strength and increased motion          Objective     Tenderness     Right Knee   No tenderness in the medial joint line and pes anserinus       Active Range of Motion   Left Shoulder  Flexion: 135 degrees pain  Abudction: 115 degrees pain        Strength/Myotome Testing     Left shoulder   Planes of Motion   Flexion: 4-  Abduction: 4-  External rotation: 4-        Tests   + Painful arc, scapular assistance test, hansen-garrett test  Negative biceps load test           Precautions: N/A      Manuals 3/30            Inf glides JF            thoracic PA JF            RTC support taping             Behind back w/ strap stretch             Sciatic nerve glides             Lumbar PA             Scapular Mobs             Neuro Re-Ed 3/30            Standing banded flexion and abd ytb 2x10            Prone Y + T 3*10 ea 1# b/l            Low rows 3*10 btb            LPD 3*10 btb            Elaine arms 3*10 rtb            Banded IR/ER rtb 3x10            Banded horizontal abd             Ther Ex             UBE 3/3'            Pulleys 5'            Standing cane flexion             Doorway slides             Seated thoracic ext             Behind back w/ strap stretch             Prone Press-ups             Sciatic nerve glides             Ther Activity             Box lifts                          Gait Training                                       Modalities

## 2023-04-07 ENCOUNTER — TELEPHONE (OUTPATIENT)
Dept: OBGYN CLINIC | Facility: CLINIC | Age: 68
End: 2023-04-07

## 2023-04-07 DIAGNOSIS — M75.82 ROTATOR CUFF TENDINITIS, LEFT: Primary | ICD-10-CM

## 2023-04-07 NOTE — TELEPHONE ENCOUNTER
Left detailed message for patient stating that Dr Surinder Campoverde ordered shoulder xray for patient to have done as soon as able  Order is in  Provided my direct ph#[phone]

## 2023-04-20 PROBLEM — J30.1 SEASONAL ALLERGIC RHINITIS DUE TO POLLEN: Status: ACTIVE | Noted: 2023-04-20

## 2023-05-04 ENCOUNTER — HOSPITAL ENCOUNTER (OUTPATIENT)
Dept: RADIOLOGY | Facility: HOSPITAL | Age: 68
Discharge: HOME | End: 2023-05-04
Attending: ORTHOPAEDIC SURGERY

## 2023-05-04 DIAGNOSIS — M75.82 ROTATOR CUFF TENDINITIS, LEFT: ICD-10-CM

## 2023-05-10 ENCOUNTER — OFFICE VISIT (OUTPATIENT)
Dept: OBGYN CLINIC | Facility: CLINIC | Age: 68
End: 2023-05-10

## 2023-05-10 VITALS
WEIGHT: 196 LBS | HEIGHT: 67 IN | DIASTOLIC BLOOD PRESSURE: 83 MMHG | BODY MASS INDEX: 30.76 KG/M2 | HEART RATE: 84 BPM | SYSTOLIC BLOOD PRESSURE: 149 MMHG

## 2023-05-10 DIAGNOSIS — M75.112 NONTRAUMATIC INCOMPLETE TEAR OF LEFT ROTATOR CUFF: ICD-10-CM

## 2023-05-10 DIAGNOSIS — M25.561 RIGHT MEDIAL KNEE PAIN: Primary | ICD-10-CM

## 2023-05-10 NOTE — PROGRESS NOTES
Ortho Sports Medicine Follow Up Patient Visit     Assesment:   74 y o  female with left rotator cuff tear    Plan:  We discussed the MRI and discuss options for operative versus nonoperative treatment  Surgery would include left shoulder arthroscopy with rotator cuff repair  Nonoperative treatment would include persistent with physical therapy activity modification and over-the-counter pain medications  We discussed the expected recovery period after surgery as well as the risk benefits alternatives to both options  We also discussed that the tear is likely to increase in size over time  At this time she is not interested in pursuing surgery given the prolonged recovery period and overall good function of her shoulder at this time  I believe this is reasonable she does not have any weakness on exam and only pain with rotator cuff testing  This was also not a traumatic tear  We discussed the options for injections for her rotator cuff  We discussed that this would provide good pain relief for some time but would not help with tendon healing  She is not interested in injection today  She will continue physical therapy and home exercise program   She will return for her shoulder as needed in the future  Her knee is doing well  CSI was also offered for pain but since she is doing well functionally, I advised this is not needed now, but it may offered in the future if she has pain  Pt should continue her HEP for her shoulder and knee to maintain functional strength and motion  OTC pain medications as needed for pain  F/U PRN      History of Present Illness: The patient is a 74 y o  female presenting for follow up chronic left shoulder pain following MRI  Pt states she is feeling about the same as last visit  He has been doing her HEP from PT  She states that she has been doing slightly better since she no longer hs to  her granddaughter who can walk now    She feels that her shoulder will continue to improve now she is doing this less often  Overall she gives her shoulder a SANE score between 50 and 70 depending on her activity level  Pt states that her knee is doing well  She went for a 3 mile walk but had no significant pain  Shoulder Surgical History:  None    Past Medical, Social and Family History:  Past Medical History:   Diagnosis Date   • Hypertension    • Lymphadenopathy     LAST ASSESSED [id number]     Past Surgical History:   Procedure Laterality Date   • REDUCTION MAMMAPLASTY     • TONSILLECTOMY       No Known Allergies  Current Outpatient Medications on File Prior to Visit   Medication Sig Dispense Refill   • atorvastatin (LIPITOR) 20 mg tablet Take 1 tablet (20 mg total) by mouth daily 90 tablet 3   • Calcium Carbonate-Vitamin D 600-400 MG-UNIT per tablet Take 1 tablet by mouth daily     • cetirizine (ZyrTEC) 5 MG chewable tablet Chew 5 mg as needed     • clotrimazole-betamethasone (LOTRISONE) 1-0 05 % cream Apply topically 2 (two) times a day (Patient not taking: Reported on 4/19/2023) 30 g 0   • cycloSPORINE (Restasis) 0 05 % ophthalmic emulsion Administer 1 drop to both eyes as needed (dry eyes) 90 mL 2   • Diclofenac Sodium (VOLTAREN) 1 % Apply 2 g topically 4 (four) times a day 100 g 0   • fluticasone (FLONASE) 50 mcg/act nasal spray 2 sprays into each nostril daily 9 9 g 0   • lisinopril (ZESTRIL) 20 mg tablet Take 1 5 tablets (30 mg total) by mouth daily 90 tablet 1   • Multiple Vitamins-Minerals (MULTIVITAMIN ADULT PO) Take 1 tablet by mouth daily     • nadolol (CORGARD) 20 mg tablet Take 1 5 tablets (30 mg total) by mouth daily at bedtime 135 tablet 1   • sodium chloride (OCEAN) 0 65 % nasal spray 1 spray into each nostril as needed for rhinitis for up to 30 days 10 mL 0   • valACYclovir (VALTREX) 1,000 mg tablet Take 1 tablet (1,000 mg total) by mouth 2 (two) times a day for 14 days 90 tablet 3     No current facility-administered medications on file prior to visit  Social History     Socioeconomic History   • Marital status: /Civil Union     Spouse name: Not on file   • Number of children: Not on file   • Years of education: Not on file   • Highest education level: Not on file   Occupational History   • Not on file   Tobacco Use   • Smoking status: Never   • Smokeless tobacco: Never   Substance and Sexual Activity   • Alcohol use: Yes     Alcohol/week: 1 0 standard drink     Types: 1 Cans of beer per week     Comment: social   • Drug use: No   • Sexual activity: Yes     Partners: Male     Birth control/protection: None   Other Topics Concern   • Not on file   Social History Narrative   • Not on file     Social Determinants of Health     Financial Resource Strain: Low Risk    • Difficulty of Paying Living Expenses: Not hard at all   Food Insecurity: Not on file   Transportation Needs: No Transportation Needs   • Lack of Transportation (Medical): No   • Lack of Transportation (Non-Medical): No   Physical Activity: Not on file   Stress: Not on file   Social Connections: Not on file   Intimate Partner Violence: Not on file   Housing Stability: Not on file         I have reviewed the past medical, surgical, social and family history, medications and allergies as documented in the EMR  Review of systems: ROS is negative other than that noted in the HPI  Constitutional: Negative for fatigue and fever  HENT: Negative for sore throat  Respiratory: Negative for shortness of breath  Cardiovascular: Negative for chest pain  Gastrointestinal: Negative for abdominal pain  Endocrine: Negative for cold intolerance and heat intolerance  Genitourinary: Negative for flank pain  Musculoskeletal: Negative for back pain  Skin: Negative for rash  Allergic/Immunologic: Negative for immunocompromised state  Neurological: Negative for dizziness  Psychiatric/Behavioral: Negative for agitation        Physical Exam:    General/Constitutional: NAD, well developed, well nourished  HENT: Normocephalic, atraumatic  CV: Intact distal pulses, regular rate  Resp: No respiratory distress or labored breathing  Lymphatic: No lymphadenopathy palpated  Neuro: Alert and Oriented x 3, no focal deficits  Psych: Normal mood, normal affect  Skin: Warm, dry, no rashes, no erythema      Shoulder Exam:      Inspection: No ecchymosis, edema, or deformity   No visualized wounds or skin lesions   Palpation: no AC joint or bicipital groove tenderness  Active Motion:  FF: 180  ER: 70  IR: T12  Strength: 4+/5 abduction, 5/5 ER,  5/5 IR with mild pain  Sensory - SILT in the Radial / Ulnar / Median / Axillary nerve distributions  Motor - AIN / PIN / Radial / Ulnar / Median / Axillary motor nerves in tact  Palpable Radial pulse  Cap refill <2secs in all digits      Images:  MRI reviewed and interpreted showing Near full-thickness articular surface insertional tear of the posterior supraspinatus

## 2023-06-15 ENCOUNTER — OFFICE VISIT (OUTPATIENT)
Dept: FAMILY MEDICINE CLINIC | Facility: CLINIC | Age: 68
End: 2023-06-15
Payer: COMMERCIAL

## 2023-06-15 VITALS
TEMPERATURE: 99.1 F | WEIGHT: 196 LBS | HEART RATE: 67 BPM | OXYGEN SATURATION: 97 % | SYSTOLIC BLOOD PRESSURE: 136 MMHG | BODY MASS INDEX: 30.76 KG/M2 | DIASTOLIC BLOOD PRESSURE: 82 MMHG | HEIGHT: 67 IN

## 2023-06-15 DIAGNOSIS — E66.09 CLASS 1 OBESITY DUE TO EXCESS CALORIES WITH SERIOUS COMORBIDITY AND BODY MASS INDEX (BMI) OF 30.0 TO 30.9 IN ADULT: ICD-10-CM

## 2023-06-15 DIAGNOSIS — I10 PRIMARY HYPERTENSION: ICD-10-CM

## 2023-06-15 DIAGNOSIS — G25.0 ESSENTIAL TREMOR: ICD-10-CM

## 2023-06-15 DIAGNOSIS — L25.9 CONTACT DERMATITIS, UNSPECIFIED CONTACT DERMATITIS TYPE, UNSPECIFIED TRIGGER: Primary | ICD-10-CM

## 2023-06-15 DIAGNOSIS — R73.03 PREDIABETES: ICD-10-CM

## 2023-06-15 DIAGNOSIS — E78.2 MIXED HYPERLIPIDEMIA: ICD-10-CM

## 2023-06-15 PROCEDURE — 99214 OFFICE O/P EST MOD 30 MIN: CPT | Performed by: FAMILY MEDICINE

## 2023-06-15 RX ORDER — LISINOPRIL 30 MG/1
30 TABLET ORAL DAILY
Qty: 90 TABLET | Refills: 1 | Status: SHIPPED | OUTPATIENT
Start: 2023-06-15 | End: 2023-09-13

## 2023-06-15 NOTE — PROGRESS NOTES
Name: Chavo Leger      : 1955      MRN: [id number]  Encounter Provider: Yeimy Bethea MD  Encounter Date: 6/15/2023   Encounter department: [address] [place] [address]     1  Contact dermatitis, unspecified contact dermatitis type, unspecified trigger  Assessment & Plan:  Localized to lower abdomen  Unresponsive decorticate  We will trial patient on betamethasone ointment twice daily  Orders:  -     betamethasone valerate (VALISONE) 0 1 % ointment; Apply topically 2 (two) times a day    2  Essential tremor  -     lisinopril (ZESTRIL) 30 mg tablet; Take 1 tablet (30 mg total) by mouth daily    3  Primary hypertension  Assessment & Plan:  Currently stable  Continue current medications  Orders:  -     Comprehensive metabolic panel; Future  -     CBC and differential; Future  -     TSH, 3rd generation with Free T4 reflex; Future  -     Comprehensive metabolic panel  -     CBC and differential  -     TSH, 3rd generation with Free T4 reflex    4  Prediabetes  -     Semaglutide-Weight Management (WEGOVY) 0 25 MG/0 5ML; Inject 0 5 mL (0 25 mg total) under the skin once a week for 4 doses  -     HEMOGLOBIN A1C W/ EAG ESTIMATION; Future    5  Mixed hyperlipidemia  -     Lipid panel; Future  -     Lipid panel    6  Class 1 obesity due to excess calories with serious comorbidity and body mass index (BMI) of 30 0 to 30 9 in adult  -     Semaglutide-Weight Management (WEGOVY) 0 25 MG/0 5ML; Inject 0 5 mL (0 25 mg total) under the skin once a week for 4 doses  -     TSH, 3rd generation with Free T4 reflex; Future  -     Vitamin D 25 hydroxy; Future  -     TSH, 3rd generation with Free T4 reflex  -     Vitamin D 25 hydroxy      Orders above   Follow up in 4 months        Subjective      Patient presents with: Insect Bite  Rash: Insect bite abdominal area    now has rash  circular around insect bite    tried cortisone not helping  Concern over weight gain    Has tried multiple diets but none seem to help  She has a family history of diabetes  Review of Systems   Constitutional: Positive for unexpected weight change  Negative for fatigue and fever  HENT: Negative for sore throat  Eyes: Negative for visual disturbance  Respiratory: Negative for cough, chest tightness and shortness of breath  Cardiovascular: Negative for chest pain, palpitations and leg swelling  Gastrointestinal: Negative for abdominal pain, constipation, diarrhea and nausea  Endocrine: Negative for cold intolerance and heat intolerance  Genitourinary: Negative for flank pain  Musculoskeletal: Negative for back pain and neck pain  Skin: Positive for color change and rash  Neurological: Negative for headaches  Psychiatric/Behavioral: Negative for behavioral problems and confusion         Current Outpatient Medications on File Prior to Visit   Medication Sig   • atorvastatin (LIPITOR) 20 mg tablet Take 1 tablet (20 mg total) by mouth daily   • Calcium Carbonate-Vitamin D 600-400 MG-UNIT per tablet Take 1 tablet by mouth daily   • cetirizine (ZyrTEC) 5 MG chewable tablet Chew 5 mg as needed   • cycloSPORINE (Restasis) 0 05 % ophthalmic emulsion Administer 1 drop to both eyes as needed (dry eyes)   • Diclofenac Sodium (VOLTAREN) 1 % Apply 2 g topically 4 (four) times a day   • Multiple Vitamins-Minerals (MULTIVITAMIN ADULT PO) Take 1 tablet by mouth daily   • nadolol (CORGARD) 20 mg tablet Take 1 5 tablets (30 mg total) by mouth daily at bedtime   • [DISCONTINUED] lisinopril (ZESTRIL) 20 mg tablet Take 1 5 tablets (30 mg total) by mouth daily   • clotrimazole-betamethasone (LOTRISONE) 1-0 05 % cream Apply topically 2 (two) times a day (Patient not taking: Reported on 4/19/2023)   • fluticasone (FLONASE) 50 mcg/act nasal spray 2 sprays into each nostril daily (Patient not taking: Reported on 6/15/2023)   • sodium chloride (OCEAN) 0 65 % nasal spray 1 spray into each nostril as needed for rhinitis for "up to 30 days   • valACYclovir (VALTREX) 1,000 mg tablet Take 1 tablet (1,000 mg total) by mouth 2 (two) times a day for 14 days       Objective     /82 (BP Location: Left arm, Patient Position: Sitting, Cuff Size: Large)   Pulse 67   Temp 99 1 °F (37 3 °C)   Ht 5' 7\" (1 702 m)   Wt 88 9 kg (196 lb)   SpO2 97%   BMI 30 70 kg/m²     Physical Exam  Vitals and nursing note reviewed  Constitutional:       Appearance: She is well-developed  HENT:      Head: Normocephalic and atraumatic  Cardiovascular:      Rate and Rhythm: Normal rate and regular rhythm  Pulmonary:      Effort: Pulmonary effort is normal       Breath sounds: Normal breath sounds  Skin:     Findings: Rash present  Neurological:      General: No focal deficit present  Mental Status: She is alert     Psychiatric:         Mood and Affect: Mood normal          Behavior: Behavior normal        Alejandro Garcia MD  "

## 2023-06-15 NOTE — ASSESSMENT & PLAN NOTE
Localized to lower abdomen  Unresponsive decorticate  We will trial patient on betamethasone ointment twice daily

## 2023-06-23 ENCOUNTER — APPOINTMENT (OUTPATIENT)
Dept: LAB | Age: 68
End: 2023-06-23
Payer: COMMERCIAL

## 2023-06-23 DIAGNOSIS — E66.09 CLASS 1 OBESITY DUE TO EXCESS CALORIES WITH SERIOUS COMORBIDITY AND BODY MASS INDEX (BMI) OF 30.0 TO 30.9 IN ADULT: ICD-10-CM

## 2023-06-23 DIAGNOSIS — R73.03 PREDIABETES: Primary | ICD-10-CM

## 2023-06-23 DIAGNOSIS — E55.9 VITAMIN D DEFICIENCY: ICD-10-CM

## 2023-06-23 DIAGNOSIS — E78.2 MIXED HYPERLIPIDEMIA: ICD-10-CM

## 2023-06-23 DIAGNOSIS — I10 PRIMARY HYPERTENSION: Primary | ICD-10-CM

## 2023-06-23 DIAGNOSIS — I10 PRIMARY HYPERTENSION: ICD-10-CM

## 2023-06-23 LAB
25(OH)D3 SERPL-MCNC: 28.3 NG/ML (ref 30–100)
ALBUMIN SERPL BCP-MCNC: 3.6 G/DL (ref 3.5–5)
ALP SERPL-CCNC: 77 U/L (ref 46–116)
ALT SERPL W P-5'-P-CCNC: 33 U/L (ref 12–78)
ANION GAP SERPL CALCULATED.3IONS-SCNC: 2 MMOL/L
ANION GAP SERPL CALCULATED.3IONS-SCNC: 2 MMOL/L
AST SERPL W P-5'-P-CCNC: 21 U/L (ref 5–45)
BASOPHILS # BLD AUTO: 0.06 THOUSANDS/ÂΜL (ref 0–0.1)
BASOPHILS NFR BLD AUTO: 1 % (ref 0–1)
BILIRUB SERPL-MCNC: 0.65 MG/DL (ref 0.2–1)
BUN SERPL-MCNC: 19 MG/DL (ref 5–25)
BUN SERPL-MCNC: 19 MG/DL (ref 5–25)
CALCIUM SERPL-MCNC: 9.3 MG/DL (ref 8.3–10.1)
CALCIUM SERPL-MCNC: 9.6 MG/DL (ref 8.3–10.1)
CHLORIDE SERPL-SCNC: 110 MMOL/L (ref 96–108)
CHLORIDE SERPL-SCNC: 111 MMOL/L (ref 96–108)
CHOLEST SERPL-MCNC: 155 MG/DL
CO2 SERPL-SCNC: 27 MMOL/L (ref 21–32)
CO2 SERPL-SCNC: 28 MMOL/L (ref 21–32)
CREAT SERPL-MCNC: 0.7 MG/DL (ref 0.6–1.3)
CREAT SERPL-MCNC: 0.71 MG/DL (ref 0.6–1.3)
EOSINOPHIL # BLD AUTO: 0.15 THOUSAND/ÂΜL (ref 0–0.61)
EOSINOPHIL NFR BLD AUTO: 2 % (ref 0–6)
ERYTHROCYTE [DISTWIDTH] IN BLOOD BY AUTOMATED COUNT: 12.4 % (ref 11.6–15.1)
EST. AVERAGE GLUCOSE BLD GHB EST-MCNC: 123 MG/DL
GFR SERPL CREATININE-BSD FRML MDRD: 89 ML/MIN/1.73SQ M
GFR SERPL CREATININE-BSD FRML MDRD: 91 ML/MIN/1.73SQ M
GLUCOSE P FAST SERPL-MCNC: 102 MG/DL (ref 65–99)
GLUCOSE P FAST SERPL-MCNC: 103 MG/DL (ref 65–99)
HBA1C MFR BLD: 5.9 %
HCT VFR BLD AUTO: 40.2 % (ref 34.8–46.1)
HDLC SERPL-MCNC: 45 MG/DL
HGB BLD-MCNC: 13.2 G/DL (ref 11.5–15.4)
IMM GRANULOCYTES # BLD AUTO: 0.02 THOUSAND/UL (ref 0–0.2)
IMM GRANULOCYTES NFR BLD AUTO: 0 % (ref 0–2)
LDLC SERPL CALC-MCNC: 75 MG/DL (ref 0–100)
LYMPHOCYTES # BLD AUTO: 2.45 THOUSANDS/ÂΜL (ref 0.6–4.47)
LYMPHOCYTES NFR BLD AUTO: 37 % (ref 14–44)
MCH RBC QN AUTO: 28.9 PG (ref 26.8–34.3)
MCHC RBC AUTO-ENTMCNC: 32.8 G/DL (ref 31.4–37.4)
MCV RBC AUTO: 88 FL (ref 82–98)
MONOCYTES # BLD AUTO: 0.36 THOUSAND/ÂΜL (ref 0.17–1.22)
MONOCYTES NFR BLD AUTO: 5 % (ref 4–12)
NEUTROPHILS # BLD AUTO: 3.58 THOUSANDS/ÂΜL (ref 1.85–7.62)
NEUTS SEG NFR BLD AUTO: 55 % (ref 43–75)
NONHDLC SERPL-MCNC: 110 MG/DL
NRBC BLD AUTO-RTO: 0 /100 WBCS
PLATELET # BLD AUTO: 257 THOUSANDS/UL (ref 149–390)
PMV BLD AUTO: 9.9 FL (ref 8.9–12.7)
POTASSIUM SERPL-SCNC: 4.2 MMOL/L (ref 3.5–5.3)
POTASSIUM SERPL-SCNC: 4.2 MMOL/L (ref 3.5–5.3)
PROT SERPL-MCNC: 6.7 G/DL (ref 6.4–8.4)
RBC # BLD AUTO: 4.56 MILLION/UL (ref 3.81–5.12)
SODIUM SERPL-SCNC: 140 MMOL/L (ref 135–147)
SODIUM SERPL-SCNC: 140 MMOL/L (ref 135–147)
TRIGL SERPL-MCNC: 175 MG/DL
TSH SERPL DL<=0.05 MIU/L-ACNC: 1.31 UIU/ML (ref 0.45–4.5)
WBC # BLD AUTO: 6.62 THOUSAND/UL (ref 4.31–10.16)

## 2023-06-23 PROCEDURE — 83036 HEMOGLOBIN GLYCOSYLATED A1C: CPT

## 2023-06-23 PROCEDURE — 36415 COLL VENOUS BLD VENIPUNCTURE: CPT

## 2023-06-23 PROCEDURE — 80053 COMPREHEN METABOLIC PANEL: CPT

## 2023-06-23 PROCEDURE — 84443 ASSAY THYROID STIM HORMONE: CPT

## 2023-06-23 PROCEDURE — 80048 BASIC METABOLIC PNL TOTAL CA: CPT

## 2023-06-23 PROCEDURE — 82306 VITAMIN D 25 HYDROXY: CPT

## 2023-06-23 PROCEDURE — 80061 LIPID PANEL: CPT

## 2023-06-23 PROCEDURE — 85025 COMPLETE CBC W/AUTO DIFF WBC: CPT

## 2023-08-20 DIAGNOSIS — E78.2 MIXED HYPERLIPIDEMIA: ICD-10-CM

## 2023-08-21 RX ORDER — ATORVASTATIN CALCIUM 20 MG/1
20 TABLET, FILM COATED ORAL DAILY
Qty: 90 TABLET | Refills: 3 | OUTPATIENT
Start: 2023-08-21

## 2023-08-22 DIAGNOSIS — E78.2 MIXED HYPERLIPIDEMIA: ICD-10-CM

## 2023-08-22 DIAGNOSIS — H04.129 DRY EYE: ICD-10-CM

## 2023-08-22 RX ORDER — ATORVASTATIN CALCIUM 20 MG/1
20 TABLET, FILM COATED ORAL DAILY
Qty: 90 TABLET | Refills: 1 | Status: SHIPPED | OUTPATIENT
Start: 2023-08-22

## 2023-08-22 RX ORDER — CYCLOSPORINE 0.5 MG/ML
1 EMULSION OPHTHALMIC AS NEEDED
Qty: 90 ML | Refills: 1 | Status: SHIPPED | OUTPATIENT
Start: 2023-08-22

## 2023-09-29 ENCOUNTER — EVALUATION (OUTPATIENT)
Dept: PHYSICAL THERAPY | Age: 68
End: 2023-09-29
Payer: COMMERCIAL

## 2023-09-29 DIAGNOSIS — M75.112 NONTRAUMATIC INCOMPLETE TEAR OF LEFT ROTATOR CUFF: ICD-10-CM

## 2023-09-29 DIAGNOSIS — M25.561 RIGHT MEDIAL KNEE PAIN: Primary | ICD-10-CM

## 2023-09-29 DIAGNOSIS — M54.16 LUMBAR RADICULOPATHY: ICD-10-CM

## 2023-09-29 PROCEDURE — 97161 PT EVAL LOW COMPLEX 20 MIN: CPT | Performed by: PHYSICAL THERAPIST

## 2023-09-29 PROCEDURE — 97110 THERAPEUTIC EXERCISES: CPT | Performed by: PHYSICAL THERAPIST

## 2023-09-29 NOTE — PROGRESS NOTES
PT Evaluation     Today's date: 2023  Patient name: Vinod Barr  : 1955  MRN: [id number]  Referring provider: Sami Rodriguez  Dx:   Encounter Diagnosis     ICD-10-CM    1. Right medial knee pain  M25.561 Ambulatory Referral to Physical Therapy     PT plan of care cert/re-cert      2. Lumbar radiculopathy  M54.16 PT plan of care cert/re-cert      3. Nontraumatic incomplete tear of left rotator cuff  M75.112 Ambulatory Referral to Physical Therapy     PT plan of care cert/re-cert          Start Time: 1100  Stop Time: 1145  Total time in clinic (min): 45 minutes    Assessment  Assessment details: Pt is a 73 y/o female who presents with R knee pain. No further referral is necessary at this time. Pt has a movement impairment diagnosis of impaired quad/hip abductor recruitment representing a pathoantomical diagnosis of knee OA. Patient also has unrelated radicular stemming from lumbar spine which can also be addressed with treatment. Pt is experiencing pain, decreased strength, and decreased ROM. Pt has a positive prognosis. Pt would benefit from PT to address these impairments leading to increased functional capacity and improved quality of life. Impairments: abnormal or restricted ROM, impaired physical strength, lacks appropriate home exercise program, pain with function, poor posture  and poor body mechanics  Understanding of Dx/Px/POC: good   Prognosis: good    Plan  Patient would benefit from: skilled physical therapy  Planned modality interventions: cryotherapy and thermotherapy: hydrocollator packs  Planned therapy interventions: neuromuscular re-education, patient education, stretching, strengthening, therapeutic activities, therapeutic exercise, therapeutic training, home exercise program and graded activity  Frequency: Twice a week for 10 weeks.   Treatment plan discussed with: patient        Subjective Evaluation    History of Present Illness  Previous episode of therapy: Mechanism of injury: Pt reports flair up of sciatic nerve pain starting in 2022 which lead to altered gait pattern. On  day woke up with terrible knee pain (no MAURICIO), and eventually went to ED on . Xrays negative. Was evaluated by orthopedist of 22, attributed pain to OA patient was given injection with mild relief. Patient is now presenting to PT with medial knee pain that waxes and weans. Walking is most painful and has stiffness after sitting long periods of time. Patient is active and usually walks 3-5 miles per day when not having knee pain. Patient also enjoys lifting weights and working out on yoga mat. Patient did mention older sister is struggling with her health and she has some responsibility as a caretaker. This episode: Patient developed more LBP since last episode of therapy but was busy over the Summer helping care for grandchildren. Quality of life: good    Pain  Current pain ratin  At best pain ratin  At worst pain ratin  Quality: sharp, radiating and dull ache  Aggravating factors: overhead activity, keyboarding and lifting    Hand dominance: right    Patient Goals  Patient goals for therapy: decreased pain, independence with ADLs/IADLs, return to sport/leisure activities, increased strength and increased motion          Objective     Concurrent Complaints  Negative for night pain, disturbed sleep, bladder dysfunction, bowel dysfunction, saddle (S4) numbness, cardiac problem, kidney problem, gallbladder problem, stomach problem, ulcer, appendix problem, spleen problem, pancreas problem, history of cancer, history of trauma and infection     Active Range of Motion      Lumbar   Flexion:  Restriction level: minimal p! Extension:  Restriction level: moderate  Left lateral flexion:  Restriction level: minimal   Right lateral flexion:  Restriction level: minimal p!   Left rotation:  Restriction level: moderate  Right rotation:  Restriction level: moderate    Special Tests:  Positive R Slump and R passive SLR       Tenderness     Right Knee   No tenderness in the medial joint line and pes anserinus. Active Range of Motion   Left Knee   Flexion: 135 degrees   Extension: 0 degrees     Right Knee   Flexion: 135 degrees   Extension: 0 degrees with pain    Strength/Myotome Testing     Right Hip   Planes of Motion   Flexion: 4-  Abduction: 4-  External rotation: 4-    Left Knee   Flexion: 5  Extension: 5  Quadriceps contraction: good    Right Knee   Flexion: 4  Extension: 4  Quadriceps contraction: fair    Tests     Right Knee   Positive Thessaly's test at 5 degrees. Negative anterior drawer, anterior Lachman, medial Meghana, posterior drawer, posterior Lachman, posterior sag, valgus stress test at 0 degrees, valgus stress test at 30 degrees, varus stress test at 0 degrees, varus stress test at 30 degrees and Pala knee rules . General Comments:      Knee Comments  Patient unable to perform SLS due to pain. Step to pattern exhibited during stair navigation. Moderate limitations in gatroc mobility in R compared to L.             Precautions: N/A      Manuals 9/29            CPA             Lumbar gapping right side                                       Neuro Re-Ed             SLR                                                                                           Ther Ex             LTR             BKFO             90/90 nerve glide             Open books             Nu-step ROM                                                    Ther Activity                                       Gait Training                                       Modalities

## 2023-10-02 ENCOUNTER — OFFICE VISIT (OUTPATIENT)
Dept: PHYSICAL THERAPY | Age: 68
End: 2023-10-02
Payer: COMMERCIAL

## 2023-10-02 DIAGNOSIS — M54.16 LUMBAR RADICULOPATHY: ICD-10-CM

## 2023-10-02 DIAGNOSIS — M25.561 RIGHT MEDIAL KNEE PAIN: Primary | ICD-10-CM

## 2023-10-02 DIAGNOSIS — M75.112 NONTRAUMATIC INCOMPLETE TEAR OF LEFT ROTATOR CUFF: ICD-10-CM

## 2023-10-02 PROCEDURE — 97110 THERAPEUTIC EXERCISES: CPT

## 2023-10-02 PROCEDURE — 97112 NEUROMUSCULAR REEDUCATION: CPT

## 2023-10-02 NOTE — PROGRESS NOTES
Daily Note     Today's date: 10/2/2023  Patient name: Dariel Soliz  : 1955  MRN: [id number]  Referring provider: Liam Carrizalse  Dx:   Encounter Diagnosis     ICD-10-CM    1. Right medial knee pain  M25.561       2. Lumbar radiculopathy  M54.16       3. Nontraumatic incomplete tear of left rotator cuff  M75.112                      Subjective: pt reports feeling about the same, pt reports relief with sitting and increased pain with standing or walking, pt reports       Objective: See treatment diary below      Assessment: ex progressions as per PT POC, pt beny fairly well, some difficulty with TrA activation, pt may benefit from added core stabilization ex as beny       Plan: Continue per plan of care. Progress treatment as tolerated.        Precautions: N/A      Manuals             CPA             Lumbar gapping right side                                       Neuro Re-Ed  10/2           SLR  2x10 TrA           TrA  2x10x5"                                                                            Ther Ex  10/2           LTR  2x10x5"           BKFO  2x10           90/90 nerve glide  10x10 ea B/L           Open books  10x5"           Nu-step ROM  10'           Pt ed  VK                                     Ther Activity                                       Gait Training                                       Modalities

## 2023-10-05 ENCOUNTER — OFFICE VISIT (OUTPATIENT)
Dept: PHYSICAL THERAPY | Age: 68
End: 2023-10-05
Payer: COMMERCIAL

## 2023-10-05 DIAGNOSIS — M75.112 NONTRAUMATIC INCOMPLETE TEAR OF LEFT ROTATOR CUFF: ICD-10-CM

## 2023-10-05 DIAGNOSIS — M54.16 LUMBAR RADICULOPATHY: ICD-10-CM

## 2023-10-05 DIAGNOSIS — M25.561 RIGHT MEDIAL KNEE PAIN: Primary | ICD-10-CM

## 2023-10-05 PROCEDURE — 97112 NEUROMUSCULAR REEDUCATION: CPT

## 2023-10-05 PROCEDURE — 97140 MANUAL THERAPY 1/> REGIONS: CPT

## 2023-10-05 PROCEDURE — 97110 THERAPEUTIC EXERCISES: CPT

## 2023-10-05 NOTE — TELEPHONE ENCOUNTER
Patient was called and informed of her right knee x-ray  She already is scheduled to meet with Ortho on Jan 3rd  No - the patient is unable to be screened due to medical condition

## 2023-10-06 ENCOUNTER — APPOINTMENT (OUTPATIENT)
Dept: PHYSICAL THERAPY | Age: 68
End: 2023-10-06
Payer: COMMERCIAL

## 2023-10-10 ENCOUNTER — OFFICE VISIT (OUTPATIENT)
Dept: PHYSICAL THERAPY | Age: 68
End: 2023-10-10
Payer: COMMERCIAL

## 2023-10-10 DIAGNOSIS — M75.112 NONTRAUMATIC INCOMPLETE TEAR OF LEFT ROTATOR CUFF: ICD-10-CM

## 2023-10-10 DIAGNOSIS — M25.561 RIGHT MEDIAL KNEE PAIN: Primary | ICD-10-CM

## 2023-10-10 DIAGNOSIS — M54.16 LUMBAR RADICULOPATHY: ICD-10-CM

## 2023-10-10 PROCEDURE — 97110 THERAPEUTIC EXERCISES: CPT | Performed by: PHYSICAL THERAPIST

## 2023-10-10 PROCEDURE — 97112 NEUROMUSCULAR REEDUCATION: CPT | Performed by: PHYSICAL THERAPIST

## 2023-10-10 PROCEDURE — 97140 MANUAL THERAPY 1/> REGIONS: CPT | Performed by: PHYSICAL THERAPIST

## 2023-10-15 DIAGNOSIS — L25.9 CONTACT DERMATITIS, UNSPECIFIED CONTACT DERMATITIS TYPE, UNSPECIFIED TRIGGER: ICD-10-CM

## 2023-10-17 ENCOUNTER — OFFICE VISIT (OUTPATIENT)
Dept: PHYSICAL THERAPY | Age: 68
End: 2023-10-17
Payer: COMMERCIAL

## 2023-10-17 DIAGNOSIS — M54.16 LUMBAR RADICULOPATHY: ICD-10-CM

## 2023-10-17 DIAGNOSIS — M75.112 NONTRAUMATIC INCOMPLETE TEAR OF LEFT ROTATOR CUFF: ICD-10-CM

## 2023-10-17 DIAGNOSIS — M25.561 RIGHT MEDIAL KNEE PAIN: Primary | ICD-10-CM

## 2023-10-17 PROCEDURE — 97140 MANUAL THERAPY 1/> REGIONS: CPT | Performed by: PHYSICAL THERAPIST

## 2023-10-17 PROCEDURE — 97110 THERAPEUTIC EXERCISES: CPT | Performed by: PHYSICAL THERAPIST

## 2023-10-17 PROCEDURE — 97112 NEUROMUSCULAR REEDUCATION: CPT | Performed by: PHYSICAL THERAPIST

## 2023-10-17 NOTE — PROGRESS NOTES
Daily Note     Today's date: 10/17/2023  Patient name: Aric Menendez  : 1955  MRN: [id number]  Referring provider: Makayla Medina  Dx:   Encounter Diagnosis     ICD-10-CM    1. Right medial knee pain  M25.561       2. Lumbar radiculopathy  M54.16       3. Nontraumatic incomplete tear of left rotator cuff  M75.112           Start Time: 0845  Stop Time: 0930  Total time in clinic (min): 45 minutes    Subjective: Pt reports pain with driving. Objective: See treatment diary below      Assessment: Tolerated treatment well. Pt's POC was progressed to include core stabilization interventions Patient demonstrated fatigue post treatment and would benefit from continued PT      Plan: Continue per plan of care.       Precautions: N/A      Manuals 9/29  10/5 10/10 10/17        CPA   JF JF         Lumbar gapping right side     JF        Sciatic nerve glides    JF JF                     Neuro Re-Ed  10/2 10/5 10/10         SLR  2x10 TrA 2x10 3x10 3x10        TrA  2x10x5" 20x5"          PPU   On elbows 2x10          Bridges     3x10        Leg press     85# 3x10        Palloff press     Blk 2x10 b/l        Stef walks outs     2x10 18.5#        Ther Ex  10/2 10/5          LTR  2x10x5" 2x10x5" 2x10x5"         BKFO  2x10 2x10          90/90 nerve glide  10x10 ea B/L 10x10" ea          Open books  10x5" 10x5"          Nu-step ROM  10' 10' 10' 10'        Pt ed  VK                                     Ther Activity                                       Gait Training                                       Modalities

## 2023-10-19 ENCOUNTER — OFFICE VISIT (OUTPATIENT)
Dept: PHYSICAL THERAPY | Age: 68
End: 2023-10-19
Payer: COMMERCIAL

## 2023-10-19 ENCOUNTER — OFFICE VISIT (OUTPATIENT)
Dept: FAMILY MEDICINE CLINIC | Facility: CLINIC | Age: 68
End: 2023-10-19
Payer: COMMERCIAL

## 2023-10-19 VITALS
HEART RATE: 66 BPM | WEIGHT: 197 LBS | HEIGHT: 67 IN | RESPIRATION RATE: 18 BRPM | SYSTOLIC BLOOD PRESSURE: 130 MMHG | DIASTOLIC BLOOD PRESSURE: 78 MMHG | TEMPERATURE: 98.6 F | OXYGEN SATURATION: 98 % | BODY MASS INDEX: 30.92 KG/M2

## 2023-10-19 DIAGNOSIS — E78.2 MIXED HYPERLIPIDEMIA: ICD-10-CM

## 2023-10-19 DIAGNOSIS — Z23 ENCOUNTER FOR IMMUNIZATION: ICD-10-CM

## 2023-10-19 DIAGNOSIS — R73.03 PREDIABETES: ICD-10-CM

## 2023-10-19 DIAGNOSIS — M54.16 LUMBAR RADICULOPATHY: ICD-10-CM

## 2023-10-19 DIAGNOSIS — Z00.00 MEDICARE ANNUAL WELLNESS VISIT, SUBSEQUENT: ICD-10-CM

## 2023-10-19 DIAGNOSIS — N39.46 URINARY INCONTINENCE, MIXED: ICD-10-CM

## 2023-10-19 DIAGNOSIS — M54.16 LUMBAR RADICULOPATHY: Primary | ICD-10-CM

## 2023-10-19 DIAGNOSIS — M25.561 RIGHT MEDIAL KNEE PAIN: Primary | ICD-10-CM

## 2023-10-19 DIAGNOSIS — M75.102 TEAR OF LEFT ROTATOR CUFF, UNSPECIFIED TEAR EXTENT, UNSPECIFIED WHETHER TRAUMATIC: ICD-10-CM

## 2023-10-19 DIAGNOSIS — I10 PRIMARY HYPERTENSION: ICD-10-CM

## 2023-10-19 DIAGNOSIS — M75.112 NONTRAUMATIC INCOMPLETE TEAR OF LEFT ROTATOR CUFF: ICD-10-CM

## 2023-10-19 DIAGNOSIS — S46.219A TEAR OF BICEPS TENDON: ICD-10-CM

## 2023-10-19 DIAGNOSIS — M85.852 OSTEOPENIA OF LEFT HIP: ICD-10-CM

## 2023-10-19 DIAGNOSIS — G25.0 ESSENTIAL TREMOR: ICD-10-CM

## 2023-10-19 PROCEDURE — G0008 ADMIN INFLUENZA VIRUS VAC: HCPCS | Performed by: FAMILY MEDICINE

## 2023-10-19 PROCEDURE — G0438 PPPS, INITIAL VISIT: HCPCS | Performed by: FAMILY MEDICINE

## 2023-10-19 PROCEDURE — 90662 IIV NO PRSV INCREASED AG IM: CPT | Performed by: FAMILY MEDICINE

## 2023-10-19 PROCEDURE — 99214 OFFICE O/P EST MOD 30 MIN: CPT | Performed by: FAMILY MEDICINE

## 2023-10-19 PROCEDURE — 97140 MANUAL THERAPY 1/> REGIONS: CPT | Performed by: PHYSICAL THERAPIST

## 2023-10-19 PROCEDURE — G0442 ANNUAL ALCOHOL SCREEN 15 MIN: HCPCS | Performed by: FAMILY MEDICINE

## 2023-10-19 PROCEDURE — 97112 NEUROMUSCULAR REEDUCATION: CPT | Performed by: PHYSICAL THERAPIST

## 2023-10-19 PROCEDURE — 97110 THERAPEUTIC EXERCISES: CPT | Performed by: PHYSICAL THERAPIST

## 2023-10-19 RX ORDER — METHOCARBAMOL 500 MG/1
500 TABLET, FILM COATED ORAL
Qty: 60 TABLET | Refills: 1 | Status: SHIPPED | OUTPATIENT
Start: 2023-10-19

## 2023-10-19 NOTE — PATIENT INSTRUCTIONS
Medicare Preventive Visit Patient Instructions  Thank you for completing your Welcome to Medicare Visit or Medicare Annual Wellness Visit today. Your next wellness visit will be due in one year (10/19/2024). The screening/preventive services that you may require over the next 5-10 years are detailed below. Some tests may not apply to you based off risk factors and/or age. Screening tests ordered at today's visit but not completed yet may show as past due. Also, please note that scanned in results may not display below. Preventive Screenings:  Service Recommendations Previous Testing/Comments   Colorectal Cancer Screening  * Colonoscopy    * Fecal Occult Blood Test (FOBT)/Fecal Immunochemical Test (FIT)  * Fecal DNA/Cologuard Test  * Flexible Sigmoidoscopy Age: 43-73 years old   Colonoscopy: every 10 years (may be performed more frequently if at higher risk)  OR  FOBT/FIT: every 1 year  OR  Cologuard: every 3 years  OR  Sigmoidoscopy: every 5 years  Screening may be recommended earlier than age 39 if at higher risk for colorectal cancer. Also, an individualized decision between you and your healthcare provider will decide whether screening between the ages of 77-80 would be appropriate. Colonoscopy: 05/22/2018  FOBT/FIT: Not on file  Cologuard: Not on file  Sigmoidoscopy: Not on file    Screening Current     Breast Cancer Screening Age: 36 years old  Frequency: every 1-2 years  Not required if history of left and right mastectomy Mammogram: 08/17/2023    Screening Current   Cervical Cancer Screening Between the ages of 21-29, pap smear recommended once every 3 years. Between the ages of 32-69, can perform pap smear with HPV co-testing every 5 years.    Recommendations may differ for women with a history of total hysterectomy, cervical cancer, or abnormal pap smears in past. Pap Smear: 11/03/2020    Screening Not Indicated   Hepatitis C Screening Once for adults born between 1945 and 1965  More frequently in patients at high risk for Hepatitis C Hep C Antibody: Not on file    Screening Current   Diabetes Screening 1-2 times per year if you're at risk for diabetes or have pre-diabetes Fasting glucose: 103 mg/dL (6/23/2023)  A1C: 5.9 % (6/23/2023)  Screening Current   Cholesterol Screening Once every 5 years if you don't have a lipid disorder. May order more often based on risk factors. Lipid panel: 06/23/2023    Screening Not Indicated  History Lipid Disorder     Other Preventive Screenings Covered by Medicare:  Abdominal Aortic Aneurysm (AAA) Screening: covered once if your at risk. You're considered to be at risk if you have a family history of AAA. Lung Cancer Screening: covers low dose CT scan once per year if you meet all of the following conditions: (1) Age 48-67; (2) No signs or symptoms of lung cancer; (3) Current smoker or have quit smoking within the last 15 years; (4) You have a tobacco smoking history of at least 20 pack years (packs per day multiplied by number of years you smoked); (5) You get a written order from a healthcare provider. Glaucoma Screening: covered annually if you're considered high risk: (1) You have diabetes OR (2) Family history of glaucoma OR (3)  aged 48 and older OR (3)  American aged 72 and older  Osteoporosis Screening: covered every 2 years if you meet one of the following conditions: (1) You're estrogen deficient and at risk for osteoporosis based off medical history and other findings; (2) Have a vertebral abnormality; (3) On glucocorticoid therapy for more than 3 months; (4) Have primary hyperparathyroidism; (5) On osteoporosis medications and need to assess response to drug therapy. Last bone density test (DXA Scan): 02/07/2023. HIV Screening: covered annually if you're between the age of 14-79. Also covered annually if you are younger than 13 and older than 72 with risk factors for HIV infection.  For pregnant patients, it is covered up to 3 times per pregnancy. Immunizations:  Immunization Recommendations   Influenza Vaccine Annual influenza vaccination during flu season is recommended for all persons aged >= 6 months who do not have contraindications   Pneumococcal Vaccine   * Pneumococcal conjugate vaccine = PCV13 (Prevnar 13), PCV15 (Vaxneuvance), PCV20 (Prevnar 20)  * Pneumococcal polysaccharide vaccine = PPSV23 (Pneumovax) Adults 64-11 yo with certain risk factors or if 69+ yo  If never received any pneumonia vaccine: recommend Prevnar 20 (PCV20)  Give PCV20 if previously received 1 dose of PCV13 or PPSV23   Hepatitis B Vaccine 3 dose series if at intermediate or high risk (ex: diabetes, end stage renal disease, liver disease)   Respiratory syncytial virus (RSV) Vaccine - COVERED BY MEDICARE PART D  * RSVPreF3 (Arexvy) CDC recommends that adults 61years of age and older may receive a single dose of RSV vaccine using shared clinical decision-making (SCDM)   Tetanus (Td) Vaccine - COST NOT COVERED BY MEDICARE PART B Following completion of primary series, a booster dose should be given every 10 years to maintain immunity against tetanus. Td may also be given as tetanus wound prophylaxis. Tdap Vaccine - COST NOT COVERED BY MEDICARE PART B Recommended at least once for all adults. For pregnant patients, recommended with each pregnancy.    Shingles Vaccine (Shingrix) - COST NOT COVERED BY MEDICARE PART B  2 shot series recommended in those 19 years and older who have or will have weakened immune systems or those 50 years and older     Health Maintenance Due:      Topic Date Due   • Cervical Cancer Screening  11/03/2023   • Hepatitis C Screening  05/19/2025 (Originally 1955)   • HIV Screening  10/19/2025 (Originally 10/25/1970)   • Breast Cancer Screening: Mammogram  08/17/2024   • Colorectal Cancer Screening  05/22/2028     Immunizations Due:      Topic Date Due   • Influenza Vaccine (1) 09/01/2023     Advance Directives   What are advance directives? Advance directives are legal documents that state your wishes and plans for medical care. These plans are made ahead of time in case you lose your ability to make decisions for yourself. Advance directives can apply to any medical decision, such as the treatments you want, and if you want to donate organs. What are the types of advance directives? There are many types of advance directives, and each state has rules about how to use them. You may choose a combination of any of the following:  Living will: This is a written record of the treatment you want. You can also choose which treatments you do not want, which to limit, and which to stop at a certain time. This includes surgery, medicine, IV fluid, and tube feedings. Durable power of  for [facility]): This is a written record that states who you want to make healthcare choices for you when you are unable to make them for yourself. This person, called a proxy, is usually a family member or a friend. You may choose more than 1 proxy. Do not resuscitate (DNR) order:  A DNR order is used in case your heart stops beating or you stop breathing. It is a request not to have certain forms of treatment, such as CPR. A DNR order may be included in other types of advance directives. Medical directive: This covers the care that you want if you are in a coma, near death, or unable to make decisions for yourself. You can list the treatments you want for each condition. Treatment may include pain medicine, surgery, blood transfusions, dialysis, IV or tube feedings, and a ventilator (breathing machine). Values history: This document has questions about your views, beliefs, and how you feel and think about life. This information can help others choose the care that you would choose. Why are advance directives important? An advance directive helps you control your care.  Although spoken wishes may be used, it is better to have your wishes written down. Spoken wishes can be misunderstood, or not followed. Treatments may be given even if you do not want them. An advance directive may make it easier for your family to make difficult choices about your care. Urinary Incontinence   Urinary incontinence (UI)  is when you lose control of your bladder. UI develops because your bladder cannot store or empty urine properly. The 3 most common types of UI are stress incontinence, urge incontinence, or both. Medicines:   May be given to help strengthen your bladder control. Report any side effects of medication to your healthcare provider. Do pelvic muscle exercises often:  Your pelvic muscles help you stop urinating. Squeeze these muscles tight for 5 seconds, then relax for 5 seconds. Gradually work up to squeezing for 10 seconds. Do 3 sets of 15 repetitions a day, or as directed. This will help strengthen your pelvic muscles and improve bladder control. Train your bladder:  Go to the bathroom at set times, such as every 2 hours, even if you do not feel the urge to go. You can also try to hold your urine when you feel the urge to go. For example, hold your urine for 5 minutes when you feel the urge to go. As that becomes easier, hold your urine for 10 minutes. Self-care:   Keep a UI record. Write down how often you leak urine and how much you leak. Make a note of what you were doing when you leaked urine. Drink liquids as directed. You may need to limit the amount of liquid you drink to help control your urine leakage. Do not drink any liquid right before you go to bed. Limit or do not have drinks that contain caffeine or alcohol. Prevent constipation. Eat a variety of high-fiber foods. Good examples are high-fiber cereals, beans, vegetables, and whole-grain breads. Walking is the best way to trigger your intestines to have a bowel movement. Exercise regularly and maintain a healthy weight.   Weight loss and exercise will decrease pressure on your bladder and help you control your leakage. Use a catheter as directed  to help empty your bladder. A catheter is a tiny, plastic tube that is put into your bladder to drain your urine. Go to behavior therapy as directed. Behavior therapy may be used to help you learn to control your urge to urinate. Weight Management   Why it is important to manage your weight:  Being overweight increases your risk of health conditions such as heart disease, high blood pressure, type 2 diabetes, and certain types of cancer. It can also increase your risk for osteoarthritis, sleep apnea, and other respiratory problems. Aim for a slow, steady weight loss. Even a small amount of weight loss can lower your risk of health problems. How to lose weight safely:  A safe and healthy way to lose weight is to eat fewer calories and get regular exercise. You can lose up about 1 pound a week by decreasing the number of calories you eat by 500 calories each day. Healthy meal plan for weight management:  A healthy meal plan includes a variety of foods, contains fewer calories, and helps you stay healthy. A healthy meal plan includes the following:  Eat whole-grain foods more often. A healthy meal plan should contain fiber. Fiber is the part of grains, fruits, and vegetables that is not broken down by your body. Whole-grain foods are healthy and provide extra fiber in your diet. Some examples of whole-grain foods are whole-wheat breads and pastas, oatmeal, brown rice, and bulgur. Eat a variety of vegetables every day. Include dark, leafy greens such as spinach, kale, roosevelt greens, and mustard greens. Eat yellow and orange vegetables such as carrots, sweet potatoes, and winter squash. Eat a variety of fruits every day. Choose fresh or canned fruit (canned in its own juice or light syrup) instead of juice. Fruit juice has very little or no fiber. Eat low-fat dairy foods. Drink fat-free (skim) milk or 1% milk.  Eat fat-free yogurt and low-fat cottage cheese. Try low-fat cheeses such as mozzarella and other reduced-fat cheeses. Choose meat and other protein foods that are low in fat. Choose beans or other legumes such as split peas or lentils. Choose fish, skinless poultry (chicken or turkey), or lean cuts of red meat (beef or pork). Before you cook meat or poultry, cut off any visible fat. Use less fat and oil. Try baking foods instead of frying them. Add less fat, such as margarine, sour cream, regular salad dressing and mayonnaise to foods. Eat fewer high-fat foods. Some examples of high-fat foods include french fries, doughnuts, ice cream, and cakes. Eat fewer sweets. Limit foods and drinks that are high in sugar. This includes candy, cookies, regular soda, and sweetened drinks. Exercise:  Exercise at least 30 minutes per day on most days of the week. Some examples of exercise include walking, biking, dancing, and swimming. You can also fit in more physical activity by taking the stairs instead of the elevator or parking farther away from stores. Ask your healthcare provider about the best exercise plan for you. © Copyright [employer] 2018 Information is for End User's use only and may not be sold, redistributed or otherwise used for commercial purposes.  All illustrations and images included in CareNotes® are the copyrighted property of [employer] or [address] [place] [address]

## 2023-10-19 NOTE — ASSESSMENT & PLAN NOTE
Osteoporosis diagnosed in June 2016 with T score of -2.6, started on Boniva in January 2017 and completed 5 years of treatment  Most recent DEXA in February 2023 with T score of -1.2 in left femur  We will continue to monitor, no indication to resume antiresorptive medications at this time

## 2023-10-19 NOTE — PROGRESS NOTES
Assessment and Plan:     Problem List Items Addressed This Visit          Cardiovascular and Mediastinum    Primary hypertension    Relevant Orders    CBC and Platelet       Nervous and Auditory    Essential tremor    Relevant Medications    methocarbamol (ROBAXIN) 500 mg tablet    Other Relevant Orders    Iron Panel (Includes Ferritin, Iron Sat%, Iron, and TIBC)    Magnesium       Musculoskeletal and Integument    Osteopenia of left hip     Osteoporosis diagnosed in June 2016 with T score of -2.6, started on Boniva in January 2017 and completed 5 years of treatment  Most recent DEXA in February 2023 with T score of -1.2 in left femur  We will continue to monitor, no indication to resume antiresorptive medications at this time         Relevant Orders    Vitamin D 25 hydroxy    Tear of left rotator cuff    Tear of biceps tendon       Other    Mixed hyperlipidemia    Relevant Orders    Lipid panel    Prediabetes    Relevant Orders    Comprehensive metabolic panel     Other Visit Diagnoses       Lumbar radiculopathy    -  Primary    Relevant Medications    methocarbamol (ROBAXIN) 500 mg tablet    Medicare annual wellness visit, subsequent        Urinary incontinence, mixed        Encounter for immunization        Relevant Orders    influenza vaccine, high-dose, PF 0.7 mL (FLUZONE HIGH-DOSE)          AWV and follow-up completed today. Reviewed previous labs. Chronic conditions stable. Continue current medications. Continue PT and following with Ortho. May need to consider Pain management for radiculopathy if no improvement with PT   Consider Gelsyn injections if right knee pain continues to worsen. X-ray did show degenerative changes. BMI Counseling: Body mass index is 30.85 kg/m². The BMI is above normal. Nutrition recommendations include decreasing portion sizes, consuming healthier snacks, reducing intake of saturated and trans fat and reducing intake of cholesterol.  Exercise recommendations include moderate physical activity 150 minutes/week. No pharmacotherapy was ordered. Patient referred to PCP. Rationale for BMI follow-up plan is due to patient being overweight or obese. Depression Screening and Follow-up Plan: Patient was screened for depression during today's encounter. They screened negative with a PHQ-2 score of 0. Urinary Incontinence Plan of Care: counseling topics discussed: practice Kegel (pelvic floor strengthening) exercises. Preventive health issues were discussed with patient, and age appropriate screening tests were ordered as noted in patient's After Visit Summary. Personalized health advice and appropriate referrals for health education or preventive services given if needed, as noted in patient's After Visit Summary. History of Present Illness:     Patient presents for a Medicare Wellness Visit    Continues to see PT for sciatica. Unsure if this is working. Has good days and bad days with the sciatica. Occasionally has pain of 10/10. Rotator cuff and bicep tear follows with orthopedic surgery. Managing conservatively. Doing exercises at home. 2/10 pain. Managing well. Diet with Noom. Tolerating current medications well. Was not approved for [facility]. Patient Care Team:  Jennifer Yi MD as PCP - General (Family Medicine)     Review of Systems:     Review of Systems   Constitutional:  Negative for fatigue and fever. HENT:  Negative for sore throat. Eyes:  Negative for visual disturbance. Respiratory:  Negative for cough, chest tightness and shortness of breath. Cardiovascular:  Negative for chest pain, palpitations and leg swelling. Gastrointestinal:  Negative for abdominal pain, constipation, diarrhea and nausea. Endocrine: Negative for cold intolerance and heat intolerance. Genitourinary:  Negative for flank pain. Musculoskeletal:  Positive for back pain, gait problem and myalgias. Negative for neck pain. Skin:  Negative for rash. Neurological:  Negative for headaches. Psychiatric/Behavioral:  Negative for behavioral problems and confusion. Problem List:     Patient Active Problem List   Diagnosis    Primary hypertension    Osteopenia of left hip    Essential tremor    Mixed hyperlipidemia    Prediabetes    Class 1 obesity due to excess calories with serious comorbidity and body mass index (BMI) of 30.0 to 30.9 in adult    Vitamin D deficiency    Tinea pedis of both feet    Palpitations    Seasonal allergic rhinitis due to pollen    Contact dermatitis    Tear of left rotator cuff    Tear of biceps tendon      Past Medical and Surgical History:     Past Medical History:   Diagnosis Date    Hypertension     Lymphadenopathy     LAST ASSESSED [id number]     Past Surgical History:   Procedure Laterality Date    REDUCTION MAMMAPLASTY      TONSILLECTOMY        Family History:     Family History   Problem Relation Age of Onset    Coronary artery disease Mother     Diabetes Mother     Hypertension Mother     Osteoporosis Mother     Diabetes Father     Diabetes Sister     Hypertension Sister     Hypertension Brother       Social History:     Social History     Socioeconomic History    Marital status: /Civil Union     Spouse name: None    Number of children: None    Years of education: None    Highest education level: None   Occupational History    None   Tobacco Use    Smoking status: Never    Smokeless tobacco: Never   Vaping Use    Vaping Use: Never used   Substance and Sexual Activity    Alcohol use:  Yes     Alcohol/week: 1.0 standard drink of alcohol     Types: 1 Cans of beer per week     Comment: social    Drug use: No    Sexual activity: Yes     Partners: Male     Birth control/protection: None   Other Topics Concern    None   Social History Narrative    None     Social Determinants of Health     Financial Resource Strain: Low Risk  (10/17/2023)    Overall Financial Resource Strain (CARDIA)     Difficulty of Paying Living Expenses: Not hard at all   Food Insecurity: Not on file   Transportation Needs: No Transportation Needs (10/17/2023)    PRAPARE - Transportation     Lack of Transportation (Medical): No     Lack of Transportation (Non-Medical):  No   Physical Activity: Not on file   Stress: Not on file   Social Connections: Not on file   Intimate Partner Violence: Not on file   Housing Stability: Not on file      Medications and Allergies:     Current Outpatient Medications   Medication Sig Dispense Refill    atorvastatin (LIPITOR) 20 mg tablet Take 1 tablet (20 mg total) by mouth daily 90 tablet 1    betamethasone valerate (VALISONE) 0.1 % ointment APPLY TO AFFECTED AREA TWICE A DAY 30 g 0    Calcium Carbonate-Vitamin D 600-400 MG-UNIT per tablet Take 1 tablet by mouth daily      cetirizine (ZyrTEC) 5 MG chewable tablet Chew 5 mg as needed      cycloSPORINE (Restasis) 0.05 % ophthalmic emulsion Administer 1 drop to both eyes as needed (dry eyes) 90 mL 1    Diclofenac Sodium (VOLTAREN) 1 % Apply 2 g topically 4 (four) times a day 100 g 0    lisinopril (ZESTRIL) 30 mg tablet Take 1 tablet (30 mg total) by mouth daily 90 tablet 1    methocarbamol (ROBAXIN) 500 mg tablet Take 1 tablet (500 mg total) by mouth daily at bedtime as needed for muscle spasms 60 tablet 1    Multiple Vitamins-Minerals (MULTIVITAMIN ADULT PO) Take 1 tablet by mouth daily      nadolol (CORGARD) 20 mg tablet Take 1.5 tablets (30 mg total) by mouth daily at bedtime 135 tablet 1    clotrimazole-betamethasone (LOTRISONE) 1-0.05 % cream Apply topically 2 (two) times a day (Patient not taking: Reported on 4/19/2023) 30 g 0    fluticasone (FLONASE) 50 mcg/act nasal spray 2 sprays into each nostril daily (Patient not taking: Reported on 6/15/2023) 9.9 g 0    sodium chloride (OCEAN) 0.65 % nasal spray 1 spray into each nostril as needed for rhinitis for up to 30 days (Patient not taking: Reported on 10/19/2023) 10 mL 0    valACYclovir (VALTREX) 1,000 mg tablet Take 1 tablet (1,000 mg total) by mouth 2 (two) times a day for 14 days (Patient not taking: Reported on 10/19/2023) 90 tablet 3     No current facility-administered medications for this visit. No Known Allergies   Immunizations:     Immunization History   Administered Date(s) Administered    COVID-19 MODERNA VACC 0.5 ML IM 01/11/2021, 02/08/2021, 10/28/2021    COVID-19 Moderna mRNA Vaccine 12 Yr+ 50 mcg/0.5 mL (Spikevax) 10/05/2023    COVID-19 PFIZER VACCINE 0.3 ML IM 07/13/2022    COVID-19 Pfizer Vac BIVALENT Dylan-sucrose 12 Yr+ IM 10/26/2022    DTaP 5 01/01/2012    INFLUENZA 10/20/2010, 10/02/2021, 10/18/2022    Influenza Injectable, MDCK, Preservative Free, Quadrivalent, 0.5 mL 09/16/2020    Influenza Quadrivalent, 6-35 Months IM 11/22/2016, 11/21/2017    Influenza, recombinant, quadrivalent,injectable, preservative free 11/20/2018, 10/10/2019, 10/18/2022    Pneumococcal Conjugate Vaccine 20-valent (Pcv20), Polysace 04/19/2023    Tdap 12/31/2020    Zoster Vaccine Recombinant 02/03/2022      Health Maintenance:         Topic Date Due    Cervical Cancer Screening  11/03/2023    Hepatitis C Screening  05/19/2025 (Originally 1955)    HIV Screening  10/19/2025 (Originally 10/25/1970)    Breast Cancer Screening: Mammogram  08/17/2024    Colorectal Cancer Screening  05/22/2028         Topic Date Due    Influenza Vaccine (1) 09/01/2023      Medicare Screening Tests and Risk Assessments:     Timbo Alejandre is here for her Initial Wellness visit. Last Medicare Wellness visit information reviewed, patient interviewed, no change since last AWV. Health Risk Assessment:   Patient rates overall health as very good. Patient feels that their physical health rating is same. Patient is very satisfied with their life. Eyesight was rated as same. Hearing was rated as same. Patient feels that their emotional and mental health rating is same. Patients states they are never, rarely angry.  Patient states they are sometimes unusually tired/fatigued. Pain experienced in the last 7 days has been some. Patient's pain rating has been 3/10. Patient states that she has experienced no weight loss or gain in last 6 months. Depression Screening:   PHQ-2 Score: 0      Fall Risk Screening: In the past year, patient has experienced: no history of falling in past year      Urinary Incontinence Screening:   Patient has leaked urine accidently in the last six months. Very little. Mixed     Home Safety:  Patient does not have trouble with stairs inside or outside of their home. Patient has working smoke alarms and has no working carbon monoxide detector. Home safety hazards include: none. Nutrition:   Current diet is Limited junk food. Medications:   Patient is currently taking over-the-counter supplements. OTC medications include: see medication list. Patient is able to manage medications. Activities of Daily Living (ADLs)/Instrumental Activities of Daily Living (IADLs):   Walk and transfer into and out of bed and chair?: Yes  Dress and groom yourself?: Yes    Bathe or shower yourself?: Yes    Feed yourself?  Yes  Do your laundry/housekeeping?: Yes  Manage your money, pay your bills and track your expenses?: Yes  Make your own meals?: Yes    Do your own shopping?: Yes    Previous Hospitalizations:   Any hospitalizations or ED visits within the last 12 months?: No      Advance Care Planning:   Living will: No    Durable POA for healthcare: No    Advanced directive: No    Advanced directive counseling given: Yes      PREVENTIVE SCREENINGS      Cardiovascular Screening:    General: Screening Not Indicated and History Lipid Disorder      Diabetes Screening:     General: Screening Current      Colorectal Cancer Screening:     General: Screening Current      Breast Cancer Screening:     General: Screening Current      Cervical Cancer Screening:    General: Screening Not Indicated      Osteoporosis Screening:    General: Screening Not Indicated, History Osteoporosis and Screening Current      Lung Cancer Screening:     General: Screening Not Indicated      Hepatitis C Screening:    General: Screening Current    Screening, Brief Intervention, and Referral to Treatment (SBIRT)    Screening  Typical number of drinks in a day: 0  Typical number of drinks in a week: 2  Interpretation: Low risk drinking behavior. AUDIT-C Screenin) How often did you have a drink containing alcohol in the past year? 2 to 4 times a month  2) How many drinks did you have on a typical day when you were drinking in the past year? 0  3) How often did you have 6 or more drinks on one occasion in the past year? never    AUDIT-C Score: 2  Interpretation: Score 0-2 (female): Negative screen for alcohol misuse    Single Item Drug Screening:  How often have you used an illegal drug (including marijuana) or a prescription medication for non-medical reasons in the past year? never    Single Item Drug Screen Score: 0  Interpretation: Negative screen for possible drug use disorder    Brief Intervention  Alcohol & drug use screenings were reviewed. No concerns regarding substance use disorder identified. Time Spent  Time spent screening/evaluating the patient for alcohol misuse: 5 minutes. Other Counseling Topics:   Car/seat belt/driving safety, skin self-exam, sunscreen and regular weightbearing exercise and calcium and vitamin D intake. No results found. Physical Exam:     /78 (BP Location: Left arm, Patient Position: Sitting, Cuff Size: Standard)   Pulse 66   Temp 98.6 °F (37 °C)   Resp 18   Ht 5' 7" (1.702 m)   Wt 89.4 kg (197 lb)   SpO2 98%   BMI 30.85 kg/m²     Physical Exam  Vitals and nursing note reviewed. Constitutional:       Appearance: Normal appearance. She is well-developed. HENT:      Head: Normocephalic and atraumatic. Eyes:      Extraocular Movements: Extraocular movements intact.       Pupils: Pupils are equal, round, and reactive to light. Cardiovascular:      Rate and Rhythm: Normal rate and regular rhythm. Pulmonary:      Effort: Pulmonary effort is normal.      Breath sounds: Normal breath sounds. Abdominal:      General: Bowel sounds are normal.      Palpations: Abdomen is soft. Musculoskeletal:         General: Tenderness present. Cervical back: Normal range of motion. Comments: Shoulder exam   Strength 4/5 left   Bicep left 4/5    Right knee medial tenderness   Skin:     General: Skin is warm and dry. Neurological:      General: No focal deficit present. Mental Status: She is alert and oriented to person, place, and time.    Psychiatric:         Mood and Affect: Mood normal.         Speech: Speech normal.         Behavior: Behavior normal.          Vidal Hilario MD

## 2023-10-19 NOTE — PROGRESS NOTES
Daily Note     Today's date: 10/19/2023  Patient name: Tim Parsons  : 1955  MRN: [id number]  Referring provider: Maame Kang  Dx:   Encounter Diagnosis     ICD-10-CM    1. Right medial knee pain  M25.561       2. Lumbar radiculopathy  M54.16       3. Nontraumatic incomplete tear of left rotator cuff  M75.112                      Subjective: Pt reports no new symptoms       Objective: See treatment diary below      Assessment: Tolerated treatment well. Pt's POC progressed to include more closed chain functional strengthening. Patient demonstrated fatigue post treatment and would benefit from continued PT      Plan: Continue per plan of care.       Precautions: N/A      Manuals 9/29  10/5 10/10 10/17 10/19       CPA   JF JF         Lumbar gapping right side     JF JF       Sciatic nerve glides    JF JF JF                    Neuro Re-Ed  10/2 10/5 10/10         SLR  2x10 TrA 2x10 3x10 3x10 3x10       TrA  2x10x5" 20x5"          PPU   On elbows 2x10          Bridges     3x10 3x10       Leg press     85# 3x10 85# 3x10       Palloff press     Blk 2x10 b/l Blk 2x10       Stef walks outs     2x10 18.5# 2x10 18.5#       Ther Ex  10/2 10/5          LTR  2x10x5" 2x10x5" 2x10x5"         BKFO  2x10 2x10          90/90 nerve glide  10x10 ea B/L 10x10" ea          Open books  10x5" 10x5"          Nu-step ROM  10' 10' 10' 10'        Pt ed  VK                                     Ther Activity                                       Gait Training                                       Modalities

## 2023-10-24 ENCOUNTER — OFFICE VISIT (OUTPATIENT)
Dept: PHYSICAL THERAPY | Age: 68
End: 2023-10-24
Payer: COMMERCIAL

## 2023-10-24 DIAGNOSIS — M54.16 LUMBAR RADICULOPATHY: ICD-10-CM

## 2023-10-24 DIAGNOSIS — M25.561 RIGHT MEDIAL KNEE PAIN: Primary | ICD-10-CM

## 2023-10-24 DIAGNOSIS — M75.112 NONTRAUMATIC INCOMPLETE TEAR OF LEFT ROTATOR CUFF: ICD-10-CM

## 2023-10-24 PROCEDURE — 97110 THERAPEUTIC EXERCISES: CPT | Performed by: PHYSICAL THERAPIST

## 2023-10-24 PROCEDURE — 97140 MANUAL THERAPY 1/> REGIONS: CPT | Performed by: PHYSICAL THERAPIST

## 2023-10-24 PROCEDURE — 97112 NEUROMUSCULAR REEDUCATION: CPT | Performed by: PHYSICAL THERAPIST

## 2023-10-24 NOTE — PROGRESS NOTES
Daily Note     Today's date: 10/24/2023  Patient name: Venkata Medrano  : 1955  MRN: [id number]  Referring provider: Marck Lo  Dx:   Encounter Diagnosis     ICD-10-CM    1. Right medial knee pain  M25.561       2. Lumbar radiculopathy  M54.16       3. Nontraumatic incomplete tear of left rotator cuff  M75.112           Start Time: 1015  Stop Time: 1100  Total time in clinic (min): 45 minutes    Subjective: Pt reports improvements in symptoms       Objective: See treatment diary below      Assessment: Tolerated treatment well. POC progressed to include more core stabilizations interventions. Patient demonstrated fatigue post treatment and would benefit from continued PT      Plan: Continue per plan of care.       Precautions: N/A      Manuals 9/29  10/5 10/10 10/17 10/24       CPA   JF JF         Lumbar gapping right side     JF JF       Sciatic nerve glides    JF JF JF                    Neuro Re-Ed  10/2 10/5 10/10         SLR  2x10 TrA 2x10 3x10 3x10 3x10       TrA  2x10x5" 20x5"          PPU   On elbows 2x10          Dead bugs      10 ea side 2 sets       Leg press     85# 3x10 85# 3x10       Palloff press     Blk 2x10 b/l Blk 2x10       [place] walks outs     2x10 18.5# 2x10 18.5#       Ther Ex  10/2 10/5          LTR  2x10x5" 2x10x5" 2x10x5"         BKFO  2x10 2x10          90/90 nerve glide  10x10 ea B/L 10x10" ea          Open books  10x5" 10x5"   15x5"       Nu-step ROM  10' 10' 10' 10' 10'       Pt ed  VK                                     Ther Activity                                       Gait Training                                       Modalities

## 2023-10-26 ENCOUNTER — OFFICE VISIT (OUTPATIENT)
Dept: PHYSICAL THERAPY | Age: 68
End: 2023-10-26
Payer: COMMERCIAL

## 2023-10-26 DIAGNOSIS — M75.112 NONTRAUMATIC INCOMPLETE TEAR OF LEFT ROTATOR CUFF: ICD-10-CM

## 2023-10-26 DIAGNOSIS — M54.16 LUMBAR RADICULOPATHY: ICD-10-CM

## 2023-10-26 DIAGNOSIS — M25.561 RIGHT MEDIAL KNEE PAIN: Primary | ICD-10-CM

## 2023-10-26 PROCEDURE — 97530 THERAPEUTIC ACTIVITIES: CPT

## 2023-10-26 PROCEDURE — 97112 NEUROMUSCULAR REEDUCATION: CPT

## 2023-10-26 PROCEDURE — 97110 THERAPEUTIC EXERCISES: CPT

## 2023-10-26 NOTE — PROGRESS NOTES
Daily Note     Today's date: 10/26/2023  Patient name: Venkata Medrano  : 1955  MRN: [id number]  Referring provider: Marck Lo  Dx:   Encounter Diagnosis     ICD-10-CM    1. Right medial knee pain  M25.561       2. Lumbar radiculopathy  M54.16       3. Nontraumatic incomplete tear of left rotator cuff  M75.112                      Subjective: Patient reports some lumbar discomfort not constant. Objective: See treatment diary below      Assessment: Tolerated treatment well. Introduced slump sliders with improvement in neural tension following. Patient demonstrated fatigue post treatment and would benefit from continued PT      Plan: Progress treatment as tolerated.        Precautions: N/A      Manuals 9/29  10/5 10/10 10/17 10/24 10/26      CPA   JF JF         Lumbar gapping right side     JF JF       Sciatic nerve glides    JF JF JF self                   Neuro Re-Ed  10/2 10/5 10/10         SLR  2x10 TrA 2x10 3x10 3x10 3x10 3x10 1#      TrA  2x10x5" 20x5"          PPU   On elbows 2x10          Dead bugs      10 ea side 2 sets 2x10 ea side  Alt       Leg press     85# 3x10 85# 3x10       Palloff press     Blk 2x10 b/l Blk 2x10 Blk 2x10x5"      [place] walks outs     2x10 18.5# 2x10 18.5# 2x10 18.5# Increase #      Ther Ex  10/2 10/5          LTR  2x10x5" 2x10x5" 2x10x5"         BKFO  2x10 2x10          90/90 nerve glide  10x10 ea B/L 10x10" ea    Slump slider ea 2x10      Open books  10x5" 10x5"   15x5" 15x 5"  W/ foam roll      Nu-step ROM  10' 10' 10' 10' 10' 10'      Pt ed  VK                                     Ther Activity             Suitcase carry       2 laps ea arm 10#                    Gait Training                                       Modalities

## 2023-10-30 ENCOUNTER — OFFICE VISIT (OUTPATIENT)
Dept: PHYSICAL THERAPY | Age: 68
End: 2023-10-30
Payer: COMMERCIAL

## 2023-10-30 DIAGNOSIS — M25.561 RIGHT MEDIAL KNEE PAIN: Primary | ICD-10-CM

## 2023-10-30 DIAGNOSIS — M75.112 NONTRAUMATIC INCOMPLETE TEAR OF LEFT ROTATOR CUFF: ICD-10-CM

## 2023-10-30 DIAGNOSIS — M54.16 LUMBAR RADICULOPATHY: ICD-10-CM

## 2023-10-30 PROCEDURE — 97110 THERAPEUTIC EXERCISES: CPT | Performed by: PHYSICAL THERAPY ASSISTANT

## 2023-10-30 PROCEDURE — 97112 NEUROMUSCULAR REEDUCATION: CPT | Performed by: PHYSICAL THERAPY ASSISTANT

## 2023-10-30 NOTE — PROGRESS NOTES
Daily Note     Today's date: 10/30/2023  Patient name: Angelina Kiser  : 1955  MRN: [id number]  Referring provider: Johanna Yu  Dx:   Encounter Diagnosis     ICD-10-CM    1. Right medial knee pain  M25.561       2. Lumbar radiculopathy  M54.16       3. Nontraumatic incomplete tear of left rotator cuff  M75.112                      Subjective: Patient reports intermittent sciatic pain. Objective: See treatment diary below      Assessment: Tolerated treatment well. Patient demonstrated fatigue post treatment, exhibited good technique with therapeutic exercises, and would benefit from continued PT      Plan: Progress treatment as tolerated.        Precautions: N/A      Manuals 9/29  10/5 10/10 10/17 10/24 10/26 10/30     CPA   JF JF         Lumbar gapping right side     JF JF       Sciatic nerve glides    JF JF JF self self                  Neuro Re-Ed  10/2 10/5 10/10         SLR  2x10 TrA 2x10 3x10 3x10 3x10 3x10 1# 3x10 1#     TrA  2x10x5" 20x5"          PPU   On elbows 2x10          Dead bugs      10 ea side 2 sets 2x10 ea side  Alt  2x10 ea side alt     Leg press     85# 3x10 85# 3x10  85# 3x10     Palloff press     Blk 2x10 b/l Blk 2x10 Blk 2x10x5" Blk   5"  2x10     Stef walks outs     2x10 18.5# 2x10 18.5# 2x10 18.5# 2x10 21.0#     Ther Ex  10/2 10/5          LTR  2x10x5" 2x10x5" 2x10x5"         BKFO  2x10 2x10          90/90 nerve glide  10x10 ea B/L 10x10" ea    Slump slider ea 2x10 Slump slider 2x10 ea     Open books  10x5" 10x5"   15x5" 15x 5"  W/ foam roll 5"x15 ea     Nu-step ROM  10' 10' 10' 10' 10' 10' 10'     Pt ed  VK                                     Ther Activity             Suitcase carry       2 laps ea arm 10#  2 laps ea arm 10#                  Gait Training                                       Modalities

## 2023-10-31 ENCOUNTER — APPOINTMENT (OUTPATIENT)
Dept: PHYSICAL THERAPY | Age: 68
End: 2023-10-31
Payer: COMMERCIAL

## 2023-11-02 ENCOUNTER — OFFICE VISIT (OUTPATIENT)
Dept: PHYSICAL THERAPY | Age: 68
End: 2023-11-02
Payer: COMMERCIAL

## 2023-11-02 DIAGNOSIS — M25.561 RIGHT MEDIAL KNEE PAIN: Primary | ICD-10-CM

## 2023-11-02 DIAGNOSIS — M54.16 LUMBAR RADICULOPATHY: ICD-10-CM

## 2023-11-02 PROCEDURE — 97112 NEUROMUSCULAR REEDUCATION: CPT | Performed by: PHYSICAL THERAPIST

## 2023-11-02 PROCEDURE — 97110 THERAPEUTIC EXERCISES: CPT | Performed by: PHYSICAL THERAPIST

## 2023-11-02 PROCEDURE — 97140 MANUAL THERAPY 1/> REGIONS: CPT | Performed by: PHYSICAL THERAPIST

## 2023-11-02 NOTE — PROGRESS NOTES
Daily Note     Today's date: 2023  Patient name: Meera Ramirez  : 1955  MRN: [id number]  Referring provider: Anton Webb  Dx:   Encounter Diagnosis     ICD-10-CM    1. Right medial knee pain  M25.561       2. Lumbar radiculopathy  M54.16           Start Time: 1215  Stop Time: 1301  Total time in clinic (min): 46 minutes    Subjective: Pt reports improvements with tolerance to ADL's but still has some radicular symptoms throughout the day that resolve quickly. Objective: See treatment diary below      Assessment: Tolerated treatment well. Patient demonstrated fatigue post treatment and would benefit from continued PT      Plan: Continue per plan of care.       Precautions: N/A      Manuals 9/29  10/5 10/10 10/17 10/24 10/26 11/2     CPA   JF JF         Lumbar gapping right side     JF JF  JF     Sciatic nerve glides    JF JF JF self                   Neuro Re-Ed  10/2 10/5 10/10         SLR  2x10 TrA 2x10 3x10 3x10 3x10 3x10 1# 3x10 2#     TrA  2x10x5" 20x5"          PPU   On elbows 2x10          Dead bugs      10 ea side 2 sets 2x10 ea side  Alt  2x10 ea side alt     Leg press     85# 3x10 85# 3x10  85# 3x10     Palloff press     Blk 2x10 b/l Blk 2x10 Blk 2x10x5" Blk   5"  2x10     Stef walks outs     2x10 18.5# 2x10 18.5# 2x10 18.5# 2x10 21.0#     Ther Ex  10/2 10/5          LTR  2x10x5" 2x10x5" 2x10x5"         BKFO  2x10 2x10          90/90 nerve glide  10x10 ea B/L 10x10" ea    Slump slider ea 2x10 Slump slider 2x10 ea     Open books  10x5" 10x5"   15x5" 15x 5"  W/ foam roll 5"x15 ea     Nu-step ROM  10' 10' 10' 10' 10' 10' 10'     Pt ed  VK                                     Ther Activity             Suitcase carry       2 laps ea arm 10#  2 laps ea arm 10#                  Gait Training                                       Modalities

## 2023-11-07 ENCOUNTER — OFFICE VISIT (OUTPATIENT)
Dept: PHYSICAL THERAPY | Age: 68
End: 2023-11-07
Payer: COMMERCIAL

## 2023-11-07 DIAGNOSIS — M54.16 LUMBAR RADICULOPATHY: ICD-10-CM

## 2023-11-07 DIAGNOSIS — M75.112 NONTRAUMATIC INCOMPLETE TEAR OF LEFT ROTATOR CUFF: ICD-10-CM

## 2023-11-07 DIAGNOSIS — M25.561 RIGHT MEDIAL KNEE PAIN: Primary | ICD-10-CM

## 2023-11-07 PROCEDURE — 97112 NEUROMUSCULAR REEDUCATION: CPT | Performed by: PHYSICAL THERAPIST

## 2023-11-07 PROCEDURE — 97140 MANUAL THERAPY 1/> REGIONS: CPT | Performed by: PHYSICAL THERAPIST

## 2023-11-07 PROCEDURE — 97110 THERAPEUTIC EXERCISES: CPT | Performed by: PHYSICAL THERAPIST

## 2023-11-07 NOTE — PROGRESS NOTES
Daily Note     Today's date: 2023  Patient name: Blanca Rinaldi  : 1955  MRN: [id number]  Referring provider: Candelaria Bourne  Dx:   Encounter Diagnosis     ICD-10-CM    1. Right medial knee pain  M25.561       2. Lumbar radiculopathy  M54.16       3. Nontraumatic incomplete tear of left rotator cuff  M75.112           Start Time: 1100  Stop Time: 1145  Total time in clinic (min): 45 minutes    Subjective: Pt reports improvements in frequency of symptoms but still occasionally has flair-ups where she wakes up with moderate levels of pain. Objective: See treatment diary below      Assessment: Tolerated treatment well. Pt's POC was progressed to include more posterior chain stretching to decrease tension of nervous system. Patient demonstrated fatigue post treatment and would benefit from continued PT      Plan: Continue per plan of care.       Precautions: N/A      Manuals 9/29  10/5 10/10 10/17 10/24 10/26 11/2 11/7    CPA   JF JF         Lumbar gapping right side     JF JF  JF JF    Sciatic nerve glides    JF JF JF self  JF                 Neuro Re-Ed  10/2 10/5 10/10         SLR  2x10 TrA 2x10 3x10 3x10 3x10 3x10 1# 3x10 2# 3x10 2#    TrA  2x10x5" 20x5"          PPU   On elbows 2x10          Dead bugs      10 ea side 2 sets 2x10 ea side  Alt  2x10 ea side alt 2x10 ea side alt    Leg press     85# 3x10 85# 3x10  85# 3x10 95# 3x10    Palloff press     Blk 2x10 b/l Blk 2x10 Blk 2x10x5" Blk   5"  2x10 Blk   5"  2x10    [place] walks outs     2x10 18.5# 2x10 18.5# 2x10 18.5# 2x10 21.0# 2x10 21.0#    Ther Ex  10/2 10/5          LTR  2x10x5" 2x10x5" 2x10x5"         BKFO  2x10 2x10          90/90 nerve glide  10x10 ea B/L 10x10" ea    Slump slider ea 2x10 Slump slider 2x10 ea     Open books  10x5" 10x5"   15x5" 15x 5"  W/ foam roll 5"x15 ea 5"x15 ea    Nu-step ROM  10' 10' 10' 10' 10' 10' 10'     Pt ed  VK                                     Ther Activity             Suitcase carry       2 laps ea arm 10#  2 laps ea arm 10#                  Gait Training                                       Modalities

## 2023-11-08 DIAGNOSIS — I10 PRIMARY HYPERTENSION: ICD-10-CM

## 2023-11-08 DIAGNOSIS — G25.0 ESSENTIAL TREMOR: ICD-10-CM

## 2023-11-08 RX ORDER — NADOLOL 20 MG/1
30 TABLET ORAL
Qty: 135 TABLET | Refills: 1 | Status: SHIPPED | OUTPATIENT
Start: 2023-11-08

## 2023-11-09 ENCOUNTER — OFFICE VISIT (OUTPATIENT)
Dept: PHYSICAL THERAPY | Age: 68
End: 2023-11-09
Payer: COMMERCIAL

## 2023-11-09 DIAGNOSIS — M25.561 RIGHT MEDIAL KNEE PAIN: Primary | ICD-10-CM

## 2023-11-09 DIAGNOSIS — M54.16 LUMBAR RADICULOPATHY: ICD-10-CM

## 2023-11-09 PROCEDURE — 97112 NEUROMUSCULAR REEDUCATION: CPT | Performed by: PHYSICAL THERAPIST

## 2023-11-09 PROCEDURE — 97140 MANUAL THERAPY 1/> REGIONS: CPT | Performed by: PHYSICAL THERAPIST

## 2023-11-09 PROCEDURE — 97110 THERAPEUTIC EXERCISES: CPT | Performed by: PHYSICAL THERAPIST

## 2023-11-09 NOTE — PROGRESS NOTES
Daily Note     Today's date: 2023  Patient name: Jose Hinojosa  : 1955  MRN: [id number]  Referring provider: Mikhail Gamino  Dx:   Encounter Diagnosis     ICD-10-CM    1. Right medial knee pain  M25.561       2. Lumbar radiculopathy  M54.16           Start Time: 1100  Stop Time: 1145  Total time in clinic (min): 45 minutes    Subjective: Pt reports improvements in symptoms, still having some difficulty with symptoms after sitting in the car. Objective: See treatment diary below      Assessment: Tolerated treatment well. Patient demonstrated fatigue post treatment and would benefit from continued PT      Plan: Continue per plan of care.       Precautions: N/A      Manuals 9/29  10/5 10/10 10/17 10/24 10/26 11/2 11/9    CPA   JF JF         Lumbar gapping right side     JF JF  JF JF    Sciatic nerve glides    JF JF JF self  JF                 Neuro Re-Ed  10/2 10/5 10/10         SLR  2x10 TrA 2x10 3x10 3x10 3x10 3x10 1# 3x10 2# 3x10 2#    TrA  2x10x5" 20x5"          PPU   On elbows 2x10          Dead bugs      10 ea side 2 sets 2x10 ea side  Alt  2x10 ea side alt 2x10 ea side alt    Leg press     85# 3x10 85# 3x10  85# 3x10 95# 3x10    Palloff press     Blk 2x10 b/l Blk 2x10 Blk 2x10x5" Blk   5"  2x10 Blk   5"  2x10    [place] walks outs     2x10 18.5# 2x10 18.5# 2x10 18.5# 2x10 21.0# 2x10 21.0#    Ther Ex  10/2 10/5          LTR  2x10x5" 2x10x5" 2x10x5"         BKFO  2x10 2x10          90/90 nerve glide  10x10 ea B/L 10x10" ea    Slump slider ea 2x10 Slump slider 2x10 ea     Open books  10x5" 10x5"   15x5" 15x 5"  W/ foam roll 5"x15 ea 5"x15 ea    Nu-step ROM  10' 10' 10' 10' 10' 10' 10'     Pt ed  VK                                     Ther Activity             Suitcase carry       2 laps ea arm 10#  2 laps ea arm 10#                  Gait Training                                       Modalities

## 2023-11-14 ENCOUNTER — OFFICE VISIT (OUTPATIENT)
Dept: PHYSICAL THERAPY | Age: 68
End: 2023-11-14
Payer: COMMERCIAL

## 2023-11-14 DIAGNOSIS — M75.112 NONTRAUMATIC INCOMPLETE TEAR OF LEFT ROTATOR CUFF: ICD-10-CM

## 2023-11-14 DIAGNOSIS — M54.16 LUMBAR RADICULOPATHY: ICD-10-CM

## 2023-11-14 DIAGNOSIS — M25.561 RIGHT MEDIAL KNEE PAIN: Primary | ICD-10-CM

## 2023-11-14 PROCEDURE — 97112 NEUROMUSCULAR REEDUCATION: CPT | Performed by: PHYSICAL THERAPIST

## 2023-11-14 PROCEDURE — 97110 THERAPEUTIC EXERCISES: CPT | Performed by: PHYSICAL THERAPIST

## 2023-11-14 PROCEDURE — 97140 MANUAL THERAPY 1/> REGIONS: CPT | Performed by: PHYSICAL THERAPIST

## 2023-11-14 NOTE — PROGRESS NOTES
Daily Note     Today's date: 2023  Patient name: Kristofer Aldridge  : 1955  MRN: [id number]  Referring provider: Mandie Schwartz  Dx:   Encounter Diagnosis     ICD-10-CM    1. Right medial knee pain  M25.561       2. Lumbar radiculopathy  M54.16       3. Nontraumatic incomplete tear of left rotator cuff  M75.112           Start Time: 1100  Stop Time: 1134  Total time in clinic (min): 34 minutes    Subjective: Pt reports no improvements with symptoms that still wax and wean. Objective: See treatment diary below      Assessment: Tolerated treatment well. Patient educated on potential refferal to pain management. Patient demonstrated fatigue post treatment and would benefit from continued PT      Plan: Continue per plan of care.       Precautions: N/A      Manuals 9/29  10/5 10/10 10/17 10/24 10/26 11/2 11/9    CPA   JF JF         Lumbar gapping right side     JF JF  JF JF    Sciatic nerve glides    JF JF JF self  JF                 Neuro Re-Ed  10/2 10/5 10/10         SLR  2x10 TrA 2x10 3x10 3x10 3x10 3x10 1# 3x10 2# 3x10 2#    TrA  2x10x5" 20x5"          PPU   On elbows 2x10          Dead bugs      10 ea side 2 sets 2x10 ea side  Alt  2x10 ea side alt 2x10 ea side alt    Leg press     85# 3x10 85# 3x10  85# 3x10 95# 3x10    Palloff press     Blk 2x10 b/l Blk 2x10 Blk 2x10x5" Blk   5"  2x10 Blk   5"  2x10    [place] walks outs     2x10 18.5# 2x10 18.5# 2x10 18.5# 2x10 21.0# 2x10 21.0#    Ther Ex  10/2 10/5          LTR  2x10x5" 2x10x5" 2x10x5"         BKFO  2x10 2x10          90/90 nerve glide  10x10 ea B/L 10x10" ea    Slump slider ea 2x10 Slump slider 2x10 ea     Open books  10x5" 10x5"   15x5" 15x 5"  W/ foam roll 5"x15 ea 5"x15 ea    Nu-step ROM  10' 10' 10' 10' 10' 10' 10'     Pt ed  VK                                     Ther Activity             Suitcase carry       2 laps ea arm 10#  2 laps ea arm 10#                  Gait Training                                       Modalities

## 2023-11-16 ENCOUNTER — OFFICE VISIT (OUTPATIENT)
Dept: PHYSICAL THERAPY | Age: 68
End: 2023-11-16
Payer: COMMERCIAL

## 2023-11-16 DIAGNOSIS — M75.112 NONTRAUMATIC INCOMPLETE TEAR OF LEFT ROTATOR CUFF: ICD-10-CM

## 2023-11-16 DIAGNOSIS — M54.16 LUMBAR RADICULOPATHY: ICD-10-CM

## 2023-11-16 DIAGNOSIS — M25.561 RIGHT MEDIAL KNEE PAIN: Primary | ICD-10-CM

## 2023-11-16 PROCEDURE — 97112 NEUROMUSCULAR REEDUCATION: CPT | Performed by: PHYSICAL THERAPIST

## 2023-11-16 PROCEDURE — 97140 MANUAL THERAPY 1/> REGIONS: CPT | Performed by: PHYSICAL THERAPIST

## 2023-11-16 PROCEDURE — 97110 THERAPEUTIC EXERCISES: CPT | Performed by: PHYSICAL THERAPIST

## 2023-11-16 NOTE — PROGRESS NOTES
Daily Note     Today's date: 2023  Patient name: Meera Ramirez  : 1955  MRN: [id number]  Referring provider: Anton Webb  Dx:   Encounter Diagnosis     ICD-10-CM    1. Right medial knee pain  M25.561       2. Lumbar radiculopathy  M54.16       3. Nontraumatic incomplete tear of left rotator cuff  M75.112           Start Time: 1100  Stop Time: 1145  Total time in clinic (min): 45 minutes    Subjective: Pt reports improvements the past two days. Objective: See treatment diary below      Assessment: Tolerated treatment well. POC progressed to include more glute activation interventions. Patient demonstrated fatigue post treatment and would benefit from continued PT      Plan: Continue per plan of care.         POC expires Unit limit Auth Expiration date PT/OT + Visit Limit?   23 or 20 visits N/A N/A N/A                           Visit/Unit Tracking  AUTH Status:  Date               N/A - 25$ copay Used 14               Remaining                           Precautions: N/A      Manuals 9/29  10/5 10/10 10/17 10/24 10/26 11/2 11/9 11/16   CPA   JF JF         Lumbar gapping right side     JF JF  JF JF JF   Sciatic nerve glides    JF JF JF self  JF                 Neuro Re-Ed  10/2 10/5 10/10         Step ups          2x10 b/l   PPT with kick out          2x10 b/l ea   Bridge with kick out          2x10 b/l ea   Dead bugs      10 ea side 2 sets 2x10 ea side  Alt  2x10 ea side alt 2x10 ea side alt    Leg press     85# 3x10 85# 3x10  85# 3x10 95# 3x10 109# 3x10   Palloff press     Blk 2x10 b/l Blk 2x10 Blk 2x10x5" Blk   5"  2x10 Blk   5"  2x10    [place] walks outs     2x10 18.5# 2x10 18.5# 2x10 18.5# 2x10 21.0# 2x10 21.0# 2x10 21.0#   Ther Ex  10/2 10/5          LTR  2x10x5" 2x10x5" 2x10x5"         BKFO  2x10 2x10          90/90 nerve glide  10x10 ea B/L 10x10" ea    Slump slider ea 2x10 Slump slider 2x10 ea     Open books  10x5" 10x5"   15x5" 15x 5"  W/ foam roll 5"x15 ea 5"x15 ea Nu-step ROM  10' 10' 10' 10' 10' 10' 10'  10'   Pt ed  VK                                     Ther Activity             Suitcase carry       2 laps ea arm 10#  2 laps ea arm 10#                  Gait Training                                       Modalities

## 2023-11-21 ENCOUNTER — OFFICE VISIT (OUTPATIENT)
Dept: PHYSICAL THERAPY | Age: 68
End: 2023-11-21
Payer: COMMERCIAL

## 2023-11-21 DIAGNOSIS — M54.16 LUMBAR RADICULOPATHY: ICD-10-CM

## 2023-11-21 DIAGNOSIS — M25.561 RIGHT MEDIAL KNEE PAIN: Primary | ICD-10-CM

## 2023-11-21 DIAGNOSIS — M75.112 NONTRAUMATIC INCOMPLETE TEAR OF LEFT ROTATOR CUFF: ICD-10-CM

## 2023-11-21 PROCEDURE — 97140 MANUAL THERAPY 1/> REGIONS: CPT | Performed by: PHYSICAL THERAPIST

## 2023-11-21 PROCEDURE — 97110 THERAPEUTIC EXERCISES: CPT | Performed by: PHYSICAL THERAPIST

## 2023-11-21 PROCEDURE — 97112 NEUROMUSCULAR REEDUCATION: CPT | Performed by: PHYSICAL THERAPIST

## 2023-11-21 NOTE — PROGRESS NOTES
Daily Note     Today's date: 2023  Patient name: Phil Lopez  : 1955  MRN: [id number]  Referring provider: Marlin Wright  Dx:   Encounter Diagnosis     ICD-10-CM    1. Right medial knee pain  M25.561       2. Lumbar radiculopathy  M54.16       3. Nontraumatic incomplete tear of left rotator cuff  M75.112           Start Time: 1100  Stop Time: 1145  Total time in clinic (min): 45 minutes    Subjective: Pt reports improvements in duration of symptoms but still has radicular symptoms at time. Objective: See treatment diary below      Assessment: Tolerated treatment well. Chronic LBP focusing on stabilization and neural dynamics. Patient demonstrated fatigue post treatment and would benefit from continued PT      Plan: Continue per plan of care.       POC expires Unit limit Auth Expiration date PT/OT + Visit Limit?   23 or 20 visits N/A N/A N/A                           Visit/Unit Tracking  AUTH Status:  Date               N/A - 25$ copay Used 15               Remaining                           Precautions: N/A      Manuals 9/29  10/5 10/10 10/17 10/24 10/26 11/2 11/9 11/21   CPA   JF JF         Lumbar gapping right side     JF JF  JF JF JF   Sciatic nerve glides    JF JF JF self  JF                 Neuro Re-Ed  10/2 10/5 10/10         Step ups          2x10 b/l   PPT with kick out          2x10 b/l ea   Bridge with kick out          2x10 b/l ea   Dead bugs      10 ea side 2 sets 2x10 ea side  Alt  2x10 ea side alt 2x10 ea side alt    Leg press     85# 3x10 85# 3x10  85# 3x10 95# 3x10 109# 3x10   Palloff press     Blk 2x10 b/l Blk 2x10 Blk 2x10x5" Blk   5"  2x10 Blk   5"  2x10    [place] walks outs     2x10 18.5# 2x10 18.5# 2x10 18.5# 2x10 21.0# 2x10 21.0# 2x10 21.0#   Ther Ex  10/2 10/5          LTR  2x10x5" 2x10x5" 2x10x5"         BKFO  2x10 2x10          90/90 nerve glide  10x10 ea B/L 10x10" ea    Slump slider ea 2x10 Slump slider 2x10 ea     Open books  10x5" 10x5"   15x5" 15x 5"  W/ foam roll 5"x15 ea 5"x15 ea    Nu-step ROM  10' 10' 10' 10' 10' 10' 10'  10'   Pt ed  VK                                     Ther Activity             Suitcase carry       2 laps ea arm 10#  2 laps ea arm 10#                  Gait Training                                       Modalities

## 2023-11-24 ENCOUNTER — APPOINTMENT (OUTPATIENT)
Dept: PHYSICAL THERAPY | Age: 68
End: 2023-11-24
Payer: COMMERCIAL

## 2023-11-28 ENCOUNTER — OFFICE VISIT (OUTPATIENT)
Dept: PHYSICAL THERAPY | Age: 68
End: 2023-11-28
Payer: COMMERCIAL

## 2023-11-28 DIAGNOSIS — M54.16 LUMBAR RADICULOPATHY: Primary | ICD-10-CM

## 2023-11-28 DIAGNOSIS — M25.561 RIGHT MEDIAL KNEE PAIN: ICD-10-CM

## 2023-11-28 PROCEDURE — 97110 THERAPEUTIC EXERCISES: CPT | Performed by: PHYSICAL THERAPIST

## 2023-11-28 PROCEDURE — 97140 MANUAL THERAPY 1/> REGIONS: CPT | Performed by: PHYSICAL THERAPIST

## 2023-11-28 PROCEDURE — 97112 NEUROMUSCULAR REEDUCATION: CPT | Performed by: PHYSICAL THERAPIST

## 2023-11-28 NOTE — PROGRESS NOTES
Daily Note     Today's date: 2023  Patient name: Alexa Kyle  : 1955  MRN: [id number]  Referring provider: Bassam Edgar*  Dx:   Encounter Diagnosis     ICD-10-CM    1. Lumbar radiculopathy  M54.16 Ambulatory referral to Spine & Pain Management      2. Right medial knee pain  M25.561           Start Time: 1100  Stop Time: 1145  Total time in clinic (min): 45 minutes    Subjective: Pt reports increased symptoms. Objective: See treatment diary below      Assessment: Tolerated treatment well. Patient given refferall to pain + spine management. Potentatil discharge next visit. Patient demonstrated fatigue post treatment and would benefit from continued PT      Plan: Continue per plan of care.       POC expires Unit limit Auth Expiration date PT/OT + Visit Limit?   23 or 20 visits N/A N/A N/A                           Visit/Unit Tracking  AUTH Status:  Date               N/A - 25$ copay Used 15               Remaining                           Precautions: N/A      Manuals 9/29  10/5 10/10 10/17 10/24 10/26 11/2 11/21 11/28   CPA   JF JF         Lumbar gapping right side     JF JF  JF JF JF   Sciatic nerve glides    JF JF JF self  JF                 Neuro Re-Ed  10/2 10/5 1010         Step ups          2x10 b/l   PPT with kick out          2x10 b/l ea   Bridge with kick out          2x10 b/l ea   Dead bugs      10 ea side 2 sets 2x10 ea side  Alt  2x10 ea side alt 2x10 ea side alt    Leg press     85# 3x10 85# 3x10  85# 3x10 95# 3x10 109# 3x10   Palloff press     Blk 2x10 b/l Blk 2x10 Blk 2x10x5" Blk   5"  2x10 Blk   5"  2x10    [place] walks outs     2x10 18.5# 2x10 18.5# 2x10 18.5# 2x10 21.0# 2x10 21.0# 2x10 21.0#   Ther Ex  10/2 10/5          LTR  2x10x5" 2x10x5" 2x10x5"         BKFO  2x10 2x10          90/90 nerve glide  10x10 ea B/L 10x10" ea    Slump slider ea 2x10 Slump slider 2x10 ea     Open books  10x5" 10x5"   15x5" 15x 5"  W/ foam roll 5"x15 ea 5"x15 ea    Nu-step ROM 10' 10' 10' 10' 10' 10' 10'  10'   Pt ed  VK                                     Ther Activity             Suitcase carry       2 laps ea arm 10#  2 laps ea arm 10#                  Gait Training                                       Modalities

## 2023-11-30 ENCOUNTER — OFFICE VISIT (OUTPATIENT)
Dept: PHYSICAL THERAPY | Age: 68
End: 2023-11-30
Payer: COMMERCIAL

## 2023-11-30 DIAGNOSIS — M54.16 LUMBAR RADICULOPATHY: Primary | ICD-10-CM

## 2023-11-30 DIAGNOSIS — M25.561 RIGHT MEDIAL KNEE PAIN: ICD-10-CM

## 2023-11-30 PROCEDURE — 97112 NEUROMUSCULAR REEDUCATION: CPT

## 2023-11-30 PROCEDURE — 97110 THERAPEUTIC EXERCISES: CPT

## 2023-11-30 NOTE — PROGRESS NOTES
Discharge    Today's date: 2023  Patient name: Asya Kilpatrick  : 1955  MRN: [id number]  Referring provider: Juanjose Sharp*  Dx:   Encounter Diagnosis     ICD-10-CM    1. Lumbar radiculopathy  M54.16       2. Right medial knee pain  M25.561                      Subjective: pt reports feeling about the same, pt reports she has referral for pain mgmt but haven't scheduled appt yet      Objective: See treatment diary below  Pt ed for updated HEP  Functional Goals: All Met     Short Term Goals: to be achieved by 4 weeks  1) Patient to be independent with basic HEP. 2) Decrease pain to 2/10 at its worst.  3) Increase lumbar spine ROM to minimal deficits  in all deficient planes. 4) Increase LE strength by 1/2 MMT grade in all deficient planes. Long Term Goals: to be achieved by discharge  1) FOTO equal to or greater than 65.  2) Patient to be independent with comprehensive HEP. 3) Lumbar spine ROM WNL all planes to improve a/iadls. 4) Increase LE strength to 5/5 MMT grade in all planes to improve a/iadls. 5) Patient to report no sleep interruption secondary to pain. 6) Increase ambulatory tolerance to 60 min. Assessment:   patient has met all functional goals and has decreased frequency of symptoms but is still having episodes where she has high levels of pain.  PT and PCP agree that a pain management consult may be beneficial at this time    Plan: DC as per PT POC, pt to contact clinic with any future questions or concerns     POC expires Unit limit Auth Expiration date PT/OT + Visit Limit?   23 or 20 visits N/A N/A N/A                           Visit/Unit Tracking  AUTH Status:  Date             N/A - 25$ copay Used 15 16 17             Remaining                           Precautions: N/A      Manuals 10/17 10/24 10/26 11/2 11/21 11/28    CPA          Lumbar gapping right side JF JF  JF JF JF    Sciatic nerve glides JF JF self  JF               Neuro Re-Ed 11/30   Step ups      2x10 b/l    PPT with kick out      2x10 b/l ea    Bridge with kick out      2x10 b/l ea P!  Bridge only 2x10x5" B/L   Dead bugs  10 ea side 2 sets 2x10 ea side  Alt  2x10 ea side alt 2x10 ea side alt  LE's only 2x10   Leg press 85# 3x10 85# 3x10  85# 3x10 95# 3x10 109# 3x10    Palloff press Blk 2x10 b/l Blk 2x10 Blk 2x10x5" Blk   5"  2x10 Blk   5"  2x10  Blk 2x10 ea B/L   [place] walks outs 2x10 18.5# 2x10 18.5# 2x10 18.5# 2x10 21.0# 2x10 21.0# 2x10 21.0#    Ther Ex       11/30   LTR          BKFO          90/90 nerve glide   Slump slider ea 2x10 Slump slider 2x10 ea      Open books  15x5" 15x 5"  W/ foam roll 5"x15 ea 5"x15 ea  15x5" ea B/L   Nu-step ROM 10' 10' 10' 10'  10' 10'   Pt ed          Supine [place]ams stretch w/ strap       5x30" ea B/L   Mini squats       3x10x5"   Ther Activity          Suitcase carry   2 laps ea arm 10#  2 laps ea arm 10#                Gait Training                              Modalities

## 2023-12-08 DIAGNOSIS — G25.0 ESSENTIAL TREMOR: ICD-10-CM

## 2023-12-08 RX ORDER — LISINOPRIL 30 MG/1
30 TABLET ORAL DAILY
Qty: 90 TABLET | Refills: 1 | Status: SHIPPED | OUTPATIENT
Start: 2023-12-08

## 2023-12-22 DIAGNOSIS — E78.2 MIXED HYPERLIPIDEMIA: ICD-10-CM

## 2023-12-22 RX ORDER — ATORVASTATIN CALCIUM 20 MG/1
20 TABLET, FILM COATED ORAL DAILY
Qty: 90 TABLET | Refills: 1 | Status: SHIPPED | OUTPATIENT
Start: 2023-12-22

## 2024-02-12 ENCOUNTER — CONSULT (OUTPATIENT)
Dept: PAIN MEDICINE | Facility: CLINIC | Age: 69
End: 2024-02-12
Payer: COMMERCIAL

## 2024-02-12 VITALS
DIASTOLIC BLOOD PRESSURE: 100 MMHG | HEART RATE: 76 BPM | HEIGHT: 67 IN | BODY MASS INDEX: 30.61 KG/M2 | SYSTOLIC BLOOD PRESSURE: 185 MMHG | WEIGHT: 195 LBS

## 2024-02-12 DIAGNOSIS — M54.16 LUMBAR RADICULOPATHY: Primary | ICD-10-CM

## 2024-02-12 PROCEDURE — 99204 OFFICE O/P NEW MOD 45 MIN: CPT | Performed by: ANESTHESIOLOGY

## 2024-02-12 NOTE — PROGRESS NOTES
Assessment  1. Lumbar radiculopathy        Plan  68-year-old female referred by Dontae Paz PT, clinic for initial consultation regarding a longstanding history of lumbosacral back pain with recent radiation into the posterolateral aspect of bilateral lower extremities to the knees worse on the right than left with associated numbness in her feet bilaterally.  She denies any trauma or inciting event.  Last x-ray of the lumbar spine demonstrates spondylolisthesis at L4-5 with a sacralized L5 vertebral body.  She did do a full course of physical therapy from September 29, 2023 through November 30, 2023 and continues to do her home exercise program 30 to 45 minutes each day 3-5 times per week with some transient relief.  She does find some relief with ibuprofen.  Naproxen and Tylenol did not provide any relief.  Patient symptoms are suspicious for lumbar radiculopathy.  She also seems to have a component of sacroiliitis bilaterally.  Symptoms persist despite conservative treatment.    1.  I will order an MRI of the lumbar spine without contrast  2.  Patient may continue with ibuprofen 600 mg every 8 hours as needed  3.  Patient will continue with her home exercise program  4.  I will follow-up with the patient in 4 to 6 weeks      My impressions and treatment recommendations were discussed in detail with the patient who verbalized understanding and had no further questions.  Discharge instructions were provided. I personally saw and examined the patient and I agree with the above discussed plan of care.    No orders of the defined types were placed in this encounter.    No orders of the defined types were placed in this encounter.      History of Present Illness    Mary Lopez is a 68 y.o. female referred by Dontae Paz PT, clinic for initial consultation regarding a longstanding history of lumbosacral back pain with recent radiation into the posterolateral aspect of bilateral lower extremities to the  knees worse on the right than left with associated numbness in her feet bilaterally.  She denies any lower extremity weakness, bladder or bowel incontinence, or saddle anesthesia.  She denies any trauma or inciting event.  Last x-ray of the lumbar spine demonstrates spondylolisthesis at L4-5 with a sacralized L5 vertebral body.  She did do a full course of physical therapy from September 29, 2023 through November 30, 2023 and continues to do her home exercise program 30 to 45 minutes each day 3-5 times per week with some transient relief.  She does find some relief with ibuprofen.  Naproxen and Tylenol did not provide any relief.  The patient rates her pain a 7-9 out of 10 depending upon her activity and the pain is nearly constant.  The pain is worse in the morning and described as dull, aching, numbness, and pins-and-needles.  The pain is increased with standing and decreased with lying down, sitting, exercise, and relaxation.  She does find some relief with heat, ice, and a TENS unit, and PT.      Other than as stated above, the patient denies any interval changes in medications, medical condition, mental condition, symptoms, or allergies since the last office visit.    I have personally reviewed and/or updated the patient's past medical history, past surgical history, family history, social history, current medications, allergies, and vital signs today.     Review of Systems   Constitutional:  Negative for fever and unexpected weight change.   HENT:  Negative for trouble swallowing.    Eyes:  Positive for redness. Negative for visual disturbance.   Respiratory:  Negative for shortness of breath and wheezing.    Cardiovascular:  Negative for chest pain and palpitations.   Gastrointestinal:  Negative for constipation, diarrhea, nausea and vomiting.   Endocrine: Negative for cold intolerance, heat intolerance and polydipsia.   Genitourinary:  Negative for difficulty urinating and frequency.   Musculoskeletal:   Positive for arthralgias. Negative for gait problem, joint swelling and myalgias.   Skin:  Negative for rash.   Neurological:  Negative for dizziness, seizures, syncope, weakness and headaches.   Hematological:  Does not bruise/bleed easily.   Psychiatric/Behavioral:  Negative for dysphoric mood.    All other systems reviewed and are negative.      Patient Active Problem List   Diagnosis    Primary hypertension    Osteopenia of left hip    Essential tremor    Mixed hyperlipidemia    Prediabetes    Class 1 obesity due to excess calories with serious comorbidity and body mass index (BMI) of 30.0 to 30.9 in adult    Vitamin D deficiency    Tinea pedis of both feet    Palpitations    Seasonal allergic rhinitis due to pollen    Contact dermatitis    Tear of left rotator cuff    Tear of biceps tendon       Past Medical History:   Diagnosis Date    Hypertension     Lymphadenopathy     LAST ASSESSED [id number]       Past Surgical History:   Procedure Laterality Date    REDUCTION MAMMAPLASTY      TONSILLECTOMY         Family History   Problem Relation Age of Onset    Coronary artery disease Mother     Diabetes Mother     Hypertension Mother     Osteoporosis Mother     Diabetes Father     Diabetes Sister     Hypertension Sister     Hypertension Brother        Social History     Occupational History    Not on file   Tobacco Use    Smoking status: Never    Smokeless tobacco: Never   Vaping Use    Vaping status: Never Used   Substance and Sexual Activity    Alcohol use: Yes     Alcohol/week: 1.0 standard drink of alcohol     Types: 1 Cans of beer per week     Comment: social    Drug use: No    Sexual activity: Yes     Partners: Male     Birth control/protection: None       Current Outpatient Medications on File Prior to Visit   Medication Sig    atorvastatin (LIPITOR) 20 mg tablet TAKE 1 TABLET BY MOUTH EVERY DAY    betamethasone valerate (VALISONE) 0.1 % ointment APPLY TO AFFECTED AREA TWICE A DAY    Calcium Carbonate-Vitamin D  "600-400 MG-UNIT per tablet Take 1 tablet by mouth daily    cetirizine (ZyrTEC) 5 MG chewable tablet Chew 5 mg as needed    clotrimazole-betamethasone (LOTRISONE) 1-0.05 % cream Apply topically 2 (two) times a day (Patient not taking: Reported on 4/19/2023)    cycloSPORINE (Restasis) 0.05 % ophthalmic emulsion Administer 1 drop to both eyes as needed (dry eyes)    Diclofenac Sodium (VOLTAREN) 1 % Apply 2 g topically 4 (four) times a day    fluticasone (FLONASE) 50 mcg/act nasal spray 2 sprays into each nostril daily (Patient not taking: Reported on 6/15/2023)    lisinopril (ZESTRIL) 30 mg tablet TAKE 1 TABLET BY MOUTH EVERY DAY    methocarbamol (ROBAXIN) 500 mg tablet Take 1 tablet (500 mg total) by mouth daily at bedtime as needed for muscle spasms    Multiple Vitamins-Minerals (MULTIVITAMIN ADULT PO) Take 1 tablet by mouth daily    nadolol (CORGARD) 20 mg tablet TAKE 1.5 TABLETS (30 MG TOTAL) BY MOUTH DAILY AT BEDTIME    sodium chloride (OCEAN) 0.65 % nasal spray 1 spray into each nostril as needed for rhinitis for up to 30 days (Patient not taking: Reported on 10/19/2023)    valACYclovir (VALTREX) 1,000 mg tablet Take 1 tablet (1,000 mg total) by mouth 2 (two) times a day for 14 days (Patient not taking: Reported on 10/19/2023)     No current facility-administered medications on file prior to visit.       No Known Allergies    Physical Exam    BP (!) 185/100   Pulse 76   Ht 5' 7\" (1.702 m)   Wt 88.5 kg (195 lb)   BMI 30.54 kg/m²     Constitutional: normal, well developed, well nourished, alert, in no distress and non-toxic and no overt pain behavior.  Eyes: anicteric  HEENT: grossly intact  Neck: supple, symmetric, trachea midline and no masses   Pulmonary:even and unlabored  Cardiovascular:No edema or pitting edema present  Skin:Normal without rashes or lesions and well hydrated  Psychiatric:Mood and affect appropriate  Neurologic:Cranial Nerves II-XII grossly intact  Musculoskeletal:normal gait.  Bilateral " lumbar paraspinals tender to palpation from L4-S1.  Bilateral SI joints nontender to palpation.  Bilateral patellar and Achilles reflexes were 2 out of 4 and symmetrical.  No clonus noted bilaterally.  Bilateral lower extremity strength 5 out of 5 in all muscle groups.  Sensation intact to light touch in L3-S1 dermatomes bilaterally.  Positive straight leg raise bilaterally.  Positive Torin's test bilaterally.    Imaging    Study Result    Narrative & Impression   DXA SCAN     CLINICAL HISTORY:  67-year-old postmenopausal female.   OTHER RISK FACTORS:  None.     PHARMACOLOGIC THERAPY FOR OSTEOPOROSIS:  None currently.     TECHNIQUE: Bone densitometry was performed using a [employer] Horizon A  bone densitometer.  Regions of interest appear properly placed.         COMPARISON: 7/24/2020.     RESULTS:      LUMBAR SPINE  Level: L1-L2  (L3, L4 vertebrae excluded from analysis due to local structural abnormalities or artifact) :   BMD:  0.928  gm/cm2   T-score: -0.5         LEFT  TOTAL HIP:   BMD:  0.895  gm/cm2    T-score:  -0.4     LEFT  FEMORAL NECK:   BMD:  0.716  gm/cm2   T score: -1.2      LEFT  FOREARM:    33% RADIUS BMD:  0.676  gm/cm2  T-score:  -0.1         IMPRESSION:     1.  Low bone mass (osteopenia). [Based on the left femoral neck]     2.  Since a DXA study from 7/24/2020, there has been:  A  STATISTICALLY SIGNIFICANT DECREASE in bone mineral density of  0.051 g/cm2 (7.1%) in the left radius.  A  STATISTICALLY SIGNIFICANT INCREASE in bone mineral density of  0.044 g/cm2 (5.2%) in the left total hip.        3.  The 10 year risk of hip fracture is 0.7% with the 10 year risk of major osteoporotic fracture being 8.1% as calculated by the [employer] fracture risk assessment tool (FRAX, which is based on data generated by the WHO Collaborating   [employer] Metabolic Bone Diseases).     4.  The current NOF guidelines recommend treating patients with a T-score of -2.5 or less in the lumbar spine or  hips, or in post-menopausal women and men over the age of 50 with low bone mass (osteopenia) and a FRAX 10 year risk score of >3% for hip   fracture and/or >20% for major osteoporotic fracture.     5.  The NOF recommends follow-up DXA in 1-2 years after initiating therapy for osteoporosis and every 2 years thereafter. More frequent evaluation is appropriate for patients with conditions associated with rapid bone loss, such as glucocorticoid   therapy. The interval between DXA screenings may be longer for individuals without major risk factors and initial T-score in the normal or upper low bone mass range.        The FRAX algorithm has certain limitations:  -FRAX has not been validated in patients currently or previously treated with pharmacotherapy for osteoporosis.  In such patients, clinical judgment must be exercised in interpreting FRAX scores.    -Prior hip, vertebral and humeral fragility fractures appear to confer greater risk of subsequent fracture than fractures at other sites (this is especially true for individuals with severe vertebral fractures), but quantification of this incremental   risk is not possible with FRAX.  -FRAX underestimates fracture risk in patients with history of multiple fragility fractures.  -FRAX may underestimate fracture risk in patients with history of frequent falls.  -It is not appropriate to use FRAX to monitor treatment response.        WHO CLASSIFICATION:  Normal (a T-score of -1.0 or higher)  Low bone mineral density (a T-score of less than -1.0 but higher than -2.5)  Osteoporosis (a T-score of -2.5 or less)  Severe osteoporosis (a T-score of -2.5 or less with a fragility fracture)        LEAST SIGNIFICANT CHANGE (AT 95% C.I):  Lumbar spine 0.036 g/cm2; 2.2%  Total hip: 0.022 g/cm2; 2.6%  Forearm: 0.017 g/cm2; 3.0%.                          Workstation performed: [id number]          XR SPINE LUMBAR 2 OR 3 VIEWS INJURY  Order: [id number]  Narrative    This result has an  attachment that is not available.  Impression: AP lateral radiographs lumbar spine show grade 1 spondee L4 on  5 on the lateral view with what appears to be partial sacralization  bilaterally at L5 spinous process  Exam End: 06/30/21 11:16 AM Last Resulted: 06/30/21 11:19 AM   Received From: [facility]  Result Received: 11/09/23 10:58 AM    View Encounter      XR HIP/PELV 2-3 VWS RIGHT W PELVIS IF PERFORMED  Order: [id number]  Narrative    This result has an attachment that is not available.  Impression: Radiographs AP pelvis right hip show no significant bony  abnormalities some mild feathering at the inferior greater trochanteric  region on the right no loose bodies or degenerative changes  Exam End: 05/27/21 11:02 AM Last Resulted: 05/27/21 11:04 AM   Received From: [facility]  Result Received: 11/09/23 10:58 AM

## 2024-02-14 DIAGNOSIS — H04.129 DRY EYE: ICD-10-CM

## 2024-02-14 RX ORDER — CYCLOSPORINE 0.5 MG/ML
EMULSION OPHTHALMIC
Qty: 180 ML | Refills: 1 | Status: SHIPPED | OUTPATIENT
Start: 2024-02-14

## 2024-02-27 ENCOUNTER — HOSPITAL ENCOUNTER (OUTPATIENT)
Dept: MRI IMAGING | Facility: HOSPITAL | Age: 69
Discharge: HOME | End: 2024-02-27
Attending: ANESTHESIOLOGY
Payer: COMMERCIAL

## 2024-02-27 DIAGNOSIS — M54.16 LUMBAR RADICULOPATHY: ICD-10-CM

## 2024-02-27 PROCEDURE — 72148 MRI LUMBAR SPINE W/O DYE: CPT

## 2024-02-27 PROCEDURE — G1004 CDSM NDSC: HCPCS

## 2024-03-04 ENCOUNTER — TELEPHONE (OUTPATIENT)
Age: 69
End: 2024-03-04

## 2024-03-04 NOTE — TELEPHONE ENCOUNTER
Caller: pt    Doctor: stephanie    Reason for call: pt would like results of mri. I let the pt know it hasn't been read yet.    Call back#: [phone]

## 2024-03-05 ENCOUNTER — TELEPHONE (OUTPATIENT)
Age: 69
End: 2024-03-05

## 2024-03-05 NOTE — TELEPHONE ENCOUNTER
Notify the patient the MRI of her lumbar spine demonstrates findings suspicious for insufficiency fracture of the left sacral ala.  She has multilevel degenerative disc disease and arthritis with anterior shifting of the L4 on L5 vertebrae and L5 on S1 vertebrae.  Multilevel central and foraminal stenosis (narrowing where nerves exist), most severe at L4-5.  I can offer the patient bilateral L4 TFESI

## 2024-03-05 NOTE — TELEPHONE ENCOUNTER
Doctor: Fabrice   Caller Name: Angela   #:[phone]    [facility] called to speak to clinical team regarding MRI ordered by provider.

## 2024-03-06 DIAGNOSIS — M54.16 LUMBAR RADICULOPATHY: Primary | ICD-10-CM

## 2024-03-06 NOTE — TELEPHONE ENCOUNTER
Called spoke with patient- scheduled procedure. Reviewed all instructions by phone upon scheduling. Mailed copy to home

## 2024-03-06 NOTE — TELEPHONE ENCOUNTER
S/W pt and advised of the same, pt would like to proceed with injection.  Denies blood thinners.  Pt asked if she could drive, nurse advised pt to have a  as her leg could go numb.  Pt advised [facility]  to call to schedule.  Pt verbalized understanding and appreciative of call.

## 2024-03-26 ENCOUNTER — HOSPITAL ENCOUNTER (OUTPATIENT)
Dept: RADIOLOGY | Facility: CLINIC | Age: 69
Discharge: HOME | End: 2024-03-26
Payer: COMMERCIAL

## 2024-03-26 VITALS
RESPIRATION RATE: 16 BRPM | SYSTOLIC BLOOD PRESSURE: 168 MMHG | DIASTOLIC BLOOD PRESSURE: 97 MMHG | OXYGEN SATURATION: 98 % | HEART RATE: 70 BPM | TEMPERATURE: 98.4 F

## 2024-03-26 DIAGNOSIS — M54.16 LUMBAR RADICULOPATHY: ICD-10-CM

## 2024-03-26 PROCEDURE — 64483 NJX AA&/STRD TFRM EPI L/S 1: CPT | Performed by: ANESTHESIOLOGY

## 2024-03-26 RX ORDER — PAPAVERINE HCL 150 MG
15 CAPSULE, EXTENDED RELEASE ORAL ONCE
Status: COMPLETED | OUTPATIENT
Start: 2024-03-26 | End: 2024-03-26

## 2024-03-26 RX ADMIN — LIDOCAINE HYDROCHLORIDE 2 ML: 20 INJECTION, SOLUTION EPIDURAL; INFILTRATION; INTRACAUDAL; PERINEURAL at 11:58

## 2024-03-26 RX ADMIN — DEXAMETHASONE SODIUM PHOSPHATE 15 MG: 10 INJECTION, SOLUTION INTRAMUSCULAR; INTRAVENOUS at 11:58

## 2024-03-26 RX ADMIN — IOHEXOL 4 ML: 300 INJECTION, SOLUTION INTRAVENOUS at 11:56

## 2024-03-26 NOTE — DISCHARGE INSTRUCTIONS
Epidural Steroid Injection   WHAT YOU NEED TO KNOW:   An epidural steroid injection (SCOTT) is a procedure to inject steroid medicine into the epidural space. The epidural space is between your spinal cord and vertebrae. Steroids reduce inflammation and fluid buildup in your spine that may be causing pain. You may be given pain medicine along with the steroids.          ACTIVITY  Do not drive or operate machinery today.  No strenuous activity today - bending, lifting, etc.  You may resume normal activites starting tomorrow - start slowly and as tolerated.  You may shower today, but no tub baths or hot tubs.  You may have numbness for several hours from the local anesthetic. Please use caution and common sense, especially with weight-bearing activities.    CARE OF THE INJECTION SITE  If you have soreness or pain, apply ice to the area today (20 minutes on/20 minutes off).  Starting tomorrow, you may use warm, moist heat or ice if needed.  You may have an increase or change in your discomfort for 36-48 hours after your treatment.  Apply ice and continue with any pain medication you have been prescribed.  Notify the [facility] if you have any of the following: redness, drainage, swelling, headache, stiff neck or fever above 100°F.    SPECIAL INSTRUCTIONS  Our office will contact you in approximately 7 days for a progress report.    MEDICATIONS  Continue to take all routine medications.  Our office may have instructed you to hold some medications.    As no general anesthesia was used in today's procedure, you should not experience any side effects related to anesthesia.     If you are diabetic, the steroids used in today's injection may temporarily increase your blood sugar levels after the first few days after your injection. Please keep a close eye on your sugars and alert the doctor who manages your diabetes if your sugars are significantly high from your baseline or you are symptomatic.     If you have a  problem specifically related to your procedure, please call our office at [phone].  Problems not related to your procedure should be directed to your primary care physician.

## 2024-03-26 NOTE — H&P
History of Present Illness: The patient is a 68 y.o. female who presents with complaints of low back and leg pain.    Past Medical History:   Diagnosis Date    Hypertension     Lymphadenopathy     LAST ASSESSED [id number]       Past Surgical History:   Procedure Laterality Date    REDUCTION MAMMAPLASTY      TONSILLECTOMY           Current Outpatient Medications:     atorvastatin (LIPITOR) 20 mg tablet, TAKE 1 TABLET BY MOUTH EVERY DAY, Disp: 90 tablet, Rfl: 1    betamethasone valerate (VALISONE) 0.1 % ointment, APPLY TO AFFECTED AREA TWICE A DAY, Disp: 30 g, Rfl: 0    Calcium Carbonate-Vitamin D 600-400 MG-UNIT per tablet, Take 1 tablet by mouth daily, Disp: , Rfl:     cetirizine (ZyrTEC) 5 MG chewable tablet, Chew 5 mg as needed, Disp: , Rfl:     clotrimazole-betamethasone (LOTRISONE) 1-0.05 % cream, Apply topically 2 (two) times a day (Patient not taking: Reported on 4/19/2023), Disp: 30 g, Rfl: 0    Diclofenac Sodium (VOLTAREN) 1 %, Apply 2 g topically 4 (four) times a day, Disp: 100 g, Rfl: 0    fluticasone (FLONASE) 50 mcg/act nasal spray, 2 sprays into each nostril daily (Patient not taking: Reported on 6/15/2023), Disp: 9.9 g, Rfl: 0    lisinopril (ZESTRIL) 30 mg tablet, TAKE 1 TABLET BY MOUTH EVERY DAY, Disp: 90 tablet, Rfl: 1    methocarbamol (ROBAXIN) 500 mg tablet, Take 1 tablet (500 mg total) by mouth daily at bedtime as needed for muscle spasms, Disp: 60 tablet, Rfl: 1    Multiple Vitamins-Minerals (MULTIVITAMIN ADULT PO), Take 1 tablet by mouth daily, Disp: , Rfl:     nadolol (CORGARD) 20 mg tablet, TAKE 1.5 TABLETS (30 MG TOTAL) BY MOUTH DAILY AT BEDTIME, Disp: 135 tablet, Rfl: 1    Restasis 0.05 % ophthalmic emulsion, ADMINISTER 1 DROP TO BOTH EYES AS NEEDED (DRY EYES), Disp: 180 mL, Rfl: 1    sodium chloride (OCEAN) 0.65 % nasal spray, 1 spray into each nostril as needed for rhinitis for up to 30 days (Patient not taking: Reported on 10/19/2023), Disp: 10 mL, Rfl: 0    valACYclovir (VALTREX) 1,000 mg  tablet, Take 1 tablet (1,000 mg total) by mouth 2 (two) times a day for 14 days (Patient not taking: Reported on 10/19/2023), Disp: 90 tablet, Rfl: 3    No Known Allergies    Physical Exam:   Vitals:    03/26/24 1133   BP: (!) 185/96   Pulse: 70   Resp: 20   Temp: 98.4 °F (36.9 °C)   SpO2: 91%     General: Awake, Alert, Oriented x 3, Mood and affect appropriate  Respiratory: Respirations even and unlabored  Cardiovascular: Peripheral pulses intact; no edema  Musculoskeletal Exam: Antalgic gait    ASA Score: 2         Assessment:   1. Lumbar radiculopathy        Plan: bilateral L4 TFESI

## 2024-04-02 ENCOUNTER — TELEPHONE (OUTPATIENT)
Dept: PAIN MEDICINE | Facility: CLINIC | Age: 69
End: 2024-04-02

## 2024-04-02 NOTE — TELEPHONE ENCOUNTER
Patient Reports     0    %     improvement post injection    Pain Level  4-5   /10  Will wait till next call

## 2024-04-23 ENCOUNTER — TELEPHONE (OUTPATIENT)
Age: 69
End: 2024-04-23

## 2024-04-25 ENCOUNTER — APPOINTMENT (OUTPATIENT)
Dept: LAB | Facility: MEDICAL CENTER | Age: 69
End: 2024-04-25
Payer: COMMERCIAL

## 2024-04-25 DIAGNOSIS — I10 PRIMARY HYPERTENSION: ICD-10-CM

## 2024-04-25 DIAGNOSIS — M85.852 OSTEOPENIA OF LEFT HIP: ICD-10-CM

## 2024-04-25 DIAGNOSIS — E78.2 MIXED HYPERLIPIDEMIA: ICD-10-CM

## 2024-04-25 DIAGNOSIS — R73.03 PREDIABETES: ICD-10-CM

## 2024-04-25 DIAGNOSIS — G25.0 ESSENTIAL TREMOR: ICD-10-CM

## 2024-04-25 LAB
25(OH)D3 SERPL-MCNC: 40.9 NG/ML (ref 30–100)
ALBUMIN SERPL BCP-MCNC: 4.3 G/DL (ref 3.5–5)
ALP SERPL-CCNC: 64 U/L (ref 34–104)
ALT SERPL W P-5'-P-CCNC: 29 U/L (ref 7–52)
ANION GAP SERPL CALCULATED.3IONS-SCNC: 8 MMOL/L (ref 4–13)
AST SERPL W P-5'-P-CCNC: 21 U/L (ref 13–39)
BILIRUB SERPL-MCNC: 0.53 MG/DL (ref 0.2–1)
BUN SERPL-MCNC: 25 MG/DL (ref 5–25)
CALCIUM SERPL-MCNC: 9.6 MG/DL (ref 8.4–10.2)
CHLORIDE SERPL-SCNC: 105 MMOL/L (ref 96–108)
CHOLEST SERPL-MCNC: 157 MG/DL
CO2 SERPL-SCNC: 29 MMOL/L (ref 21–32)
CREAT SERPL-MCNC: 0.75 MG/DL (ref 0.6–1.3)
ERYTHROCYTE [DISTWIDTH] IN BLOOD BY AUTOMATED COUNT: 12.9 % (ref 11.6–15.1)
FERRITIN SERPL-MCNC: 83 NG/ML (ref 11–307)
GFR SERPL CREATININE-BSD FRML MDRD: 83 ML/MIN/1.73SQ M
GLUCOSE P FAST SERPL-MCNC: 103 MG/DL (ref 65–99)
HCT VFR BLD AUTO: 39.3 % (ref 34.8–46.1)
HDLC SERPL-MCNC: 47 MG/DL
HGB BLD-MCNC: 13.1 G/DL (ref 11.5–15.4)
LDLC SERPL CALC-MCNC: 81 MG/DL (ref 0–100)
MAGNESIUM SERPL-MCNC: 2 MG/DL (ref 1.9–2.7)
MCH RBC QN AUTO: 29.8 PG (ref 26.8–34.3)
MCHC RBC AUTO-ENTMCNC: 33.3 G/DL (ref 31.4–37.4)
MCV RBC AUTO: 89 FL (ref 82–98)
NONHDLC SERPL-MCNC: 110 MG/DL
PLATELET # BLD AUTO: 289 THOUSANDS/UL (ref 149–390)
PMV BLD AUTO: 10.3 FL (ref 8.9–12.7)
POTASSIUM SERPL-SCNC: 4.8 MMOL/L (ref 3.5–5.3)
PROT SERPL-MCNC: 6.6 G/DL (ref 6.4–8.4)
RBC # BLD AUTO: 4.4 MILLION/UL (ref 3.81–5.12)
SODIUM SERPL-SCNC: 142 MMOL/L (ref 135–147)
TRIGL SERPL-MCNC: 146 MG/DL
WBC # BLD AUTO: 7.82 THOUSAND/UL (ref 4.31–10.16)

## 2024-04-25 PROCEDURE — 80061 LIPID PANEL: CPT

## 2024-04-25 PROCEDURE — 82306 VITAMIN D 25 HYDROXY: CPT

## 2024-04-25 PROCEDURE — 80053 COMPREHEN METABOLIC PANEL: CPT

## 2024-04-25 PROCEDURE — 83550 IRON BINDING TEST: CPT

## 2024-04-25 PROCEDURE — 36415 COLL VENOUS BLD VENIPUNCTURE: CPT

## 2024-04-25 PROCEDURE — 82728 ASSAY OF FERRITIN: CPT

## 2024-04-25 PROCEDURE — 83735 ASSAY OF MAGNESIUM: CPT

## 2024-04-25 PROCEDURE — 83540 ASSAY OF IRON: CPT

## 2024-04-25 PROCEDURE — 85027 COMPLETE CBC AUTOMATED: CPT

## 2024-04-26 LAB
IRON SATN MFR SERPL: 24 % (ref 15–50)
IRON SERPL-MCNC: 98 UG/DL (ref 50–212)
TIBC SERPL-MCNC: 406 UG/DL (ref 250–450)
UIBC SERPL-MCNC: 308 UG/DL (ref 155–355)

## 2024-05-01 ENCOUNTER — TELEPHONE (OUTPATIENT)
Dept: FAMILY MEDICINE CLINIC | Facility: CLINIC | Age: 69
End: 2024-05-01

## 2024-05-01 ENCOUNTER — OFFICE VISIT (OUTPATIENT)
Dept: FAMILY MEDICINE CLINIC | Facility: CLINIC | Age: 69
End: 2024-05-01
Payer: COMMERCIAL

## 2024-05-01 VITALS
HEIGHT: 67 IN | RESPIRATION RATE: 18 BRPM | OXYGEN SATURATION: 98 % | WEIGHT: 198 LBS | HEART RATE: 76 BPM | SYSTOLIC BLOOD PRESSURE: 146 MMHG | BODY MASS INDEX: 31.08 KG/M2 | TEMPERATURE: 98.2 F | DIASTOLIC BLOOD PRESSURE: 80 MMHG

## 2024-05-01 DIAGNOSIS — M48.061 SPINAL STENOSIS AT L4-L5 LEVEL: ICD-10-CM

## 2024-05-01 DIAGNOSIS — E78.2 MIXED HYPERLIPIDEMIA: ICD-10-CM

## 2024-05-01 DIAGNOSIS — M54.16 LUMBAR RADICULOPATHY: ICD-10-CM

## 2024-05-01 DIAGNOSIS — R73.03 PREDIABETES: ICD-10-CM

## 2024-05-01 DIAGNOSIS — G25.0 ESSENTIAL TREMOR: ICD-10-CM

## 2024-05-01 DIAGNOSIS — I10 PRIMARY HYPERTENSION: Primary | ICD-10-CM

## 2024-05-01 PROBLEM — R00.2 PALPITATIONS: Status: RESOLVED | Noted: 2021-12-08 | Resolved: 2024-05-01

## 2024-05-01 PROCEDURE — G2211 COMPLEX E/M VISIT ADD ON: HCPCS | Performed by: FAMILY MEDICINE

## 2024-05-01 PROCEDURE — 3077F SYST BP >= 140 MM HG: CPT | Performed by: FAMILY MEDICINE

## 2024-05-01 PROCEDURE — 1160F RVW MEDS BY RX/DR IN RCRD: CPT | Performed by: FAMILY MEDICINE

## 2024-05-01 PROCEDURE — 3725F SCREEN DEPRESSION PERFORMED: CPT | Performed by: FAMILY MEDICINE

## 2024-05-01 PROCEDURE — 99214 OFFICE O/P EST MOD 30 MIN: CPT | Performed by: FAMILY MEDICINE

## 2024-05-01 PROCEDURE — 3079F DIAST BP 80-89 MM HG: CPT | Performed by: FAMILY MEDICINE

## 2024-05-01 PROCEDURE — 1159F MED LIST DOCD IN RCRD: CPT | Performed by: FAMILY MEDICINE

## 2024-05-01 RX ORDER — AMLODIPINE BESYLATE 5 MG/1
5 TABLET ORAL DAILY
Qty: 30 TABLET | Refills: 5 | Status: SHIPPED | OUTPATIENT
Start: 2024-05-01

## 2024-05-01 NOTE — ASSESSMENT & PLAN NOTE
BP Readings from Last 3 Encounters:   05/01/24 146/80   03/26/24 168/97   02/12/24 (!) 185/100   Multiple elevated BP readings. Plan to add Amlodipine 5 mg daily. Monitor BP at home.     Current medications  Lisinopril 30   Nadolol 20

## 2024-05-01 NOTE — PATIENT INSTRUCTIONS
Trial extra strength Tylenol 1000 mg 3 times daily for the next week.  Can use ibuprofen for breakthrough as needed but would advise against daily use due to elevated blood pressures

## 2024-05-01 NOTE — PROGRESS NOTES
Name: Mary Lopez      : 1955      MRN: [id number]  Encounter Provider: Eugenio Schmidt MD  Encounter Date: 2024   Encounter department: [facility]    Assessment & Plan     1. Primary hypertension  Assessment & Plan:  BP Readings from Last 3 Encounters:   24 146/80   24 168/97   24 (!) 185/100   Multiple elevated BP readings. Plan to add Amlodipine 5 mg daily. Monitor BP at home.     Current medications  Lisinopril 30   Nadolol 20       Orders:  -     amLODIPine (NORVASC) 5 mg tablet; Take 1 tablet (5 mg total) by mouth daily    2. Essential tremor  Assessment & Plan:  Continue Nadolol 30 mg      3. Lumbar radiculopathy    4. Prediabetes  Assessment & Plan:  Lab Results   Component Value Date    HGBA1C 5.9 (H) 2023    HGBA1C 6.0 (H) 2021    HGBA1C 6.0 (H) 2020     Lab Results   Component Value Date    GLUF 103 (H) 2024    LDLCALC 81 2024    CREATININE 0.75 2024     Most recent fasting glucose of 103  Counseled on importance of regular exercise and healthy diet        5. Spinal stenosis at L4-L5 level  Assessment & Plan:  Continues to follow with pain management.  MRI did show severe central stenosis at L4-L5.  Methocarbamol as needed does help with sleep.  Did not respond to last epidural injection 1 month ago.  Continues to take ibuprofen anywhere from 2-5 daily.  Recommend avoiding ibuprofen due to elevated blood pressure.  With underlying pain we can trial patient on scheduled Tylenol 1000 mg 3 times daily.  This would not help with inflammation but this could help control some of her pain.      6. Mixed hyperlipidemia  Assessment & Plan:  Lab Results   Component Value Date    CHOLESTEROL 157 2024    HDL 47 (L) 2024    LDLCALC 81 2024    TRIG 146 2024     The 10-year ASCVD risk score (Gladis FERNANDEZ, et al., 2019) is: 10.1%    Values used to calculate the score:      Age: 68 years      Sex: Female       Is Non- : No      Diabetic: No      Tobacco smoker: No      Systolic Blood Pressure: 146 mmHg      Is BP treated: Yes      HDL Cholesterol: 47 mg/dL      Total Cholesterol: 157 mg/dL      Continue atorvastatin 20 mg            Depression Screening and Follow-up Plan: Patient was screened for depression during today's encounter. They screened negative with a PHQ-2 score of 0.        Subjective      Follow up     Had epidural. Went to [facility] but does not feel like epidural ever started working.  Has a follow-up appointment with pain management on Tuesday.  Pain continues to come and go.  Her blood pressure has been elevated at her past few visits.  She is currently on lisinopril and nadolol which she takes for essential tremor.  She endorses watching her sodium intake.  While she is in [place] she does endorse some shortness of breath but denies any chest tightness.        Review of Systems   Constitutional:  Negative for activity change, fatigue and fever.   Eyes:  Negative for visual disturbance.   Respiratory:  Negative for shortness of breath.    Cardiovascular:  Negative for chest pain.   Gastrointestinal:  Negative for abdominal pain, constipation, diarrhea and nausea.   Endocrine: Negative for cold intolerance and heat intolerance.   Musculoskeletal:  Positive for back pain.   Skin:  Negative for rash.   Neurological:  Negative for headaches.   Psychiatric/Behavioral:  Negative for confusion.        Current Outpatient Medications on File Prior to Visit   Medication Sig   • atorvastatin (LIPITOR) 20 mg tablet TAKE 1 TABLET BY MOUTH EVERY DAY   • betamethasone valerate (VALISONE) 0.1 % ointment APPLY TO AFFECTED AREA TWICE A DAY   • Calcium Carbonate-Vitamin D 600-400 MG-UNIT per tablet Take 1 tablet by mouth daily   • cetirizine (ZyrTEC) 5 MG chewable tablet Chew 5 mg as needed   • Diclofenac Sodium (VOLTAREN) 1 % Apply 2 g topically 4 (four) times a day   • lisinopril (ZESTRIL) 30 mg  "tablet TAKE 1 TABLET BY MOUTH EVERY DAY   • methocarbamol (ROBAXIN) 500 mg tablet Take 1 tablet (500 mg total) by mouth daily at bedtime as needed for muscle spasms   • Multiple Vitamins-Minerals (MULTIVITAMIN ADULT PO) Take 1 tablet by mouth daily   • nadolol (CORGARD) 20 mg tablet TAKE 1.5 TABLETS (30 MG TOTAL) BY MOUTH DAILY AT BEDTIME   • Restasis 0.05 % ophthalmic emulsion ADMINISTER 1 DROP TO BOTH EYES AS NEEDED (DRY EYES)   • clotrimazole-betamethasone (LOTRISONE) 1-0.05 % cream Apply topically 2 (two) times a day (Patient not taking: Reported on 4/19/2023)   • fluticasone (FLONASE) 50 mcg/act nasal spray 2 sprays into each nostril daily (Patient not taking: Reported on 6/15/2023)   • sodium chloride (OCEAN) 0.65 % nasal spray 1 spray into each nostril as needed for rhinitis for up to 30 days (Patient not taking: Reported on 10/19/2023)   • valACYclovir (VALTREX) 1,000 mg tablet Take 1 tablet (1,000 mg total) by mouth 2 (two) times a day for 14 days (Patient not taking: Reported on 10/19/2023)       Objective     /80 (BP Location: Left arm, Patient Position: Sitting, Cuff Size: Large)   Pulse 76   Temp 98.2 °F (36.8 °C)   Resp 18   Ht 5' 7\" (1.702 m)   Wt 89.8 kg (198 lb)   SpO2 98%   BMI 31.01 kg/m²     Physical Exam  Vitals and nursing note reviewed.   Constitutional:       Appearance: She is well-developed.   HENT:      Head: Normocephalic and atraumatic.   Cardiovascular:      Rate and Rhythm: Normal rate and regular rhythm.   Pulmonary:      Effort: Pulmonary effort is normal.      Breath sounds: Normal breath sounds.   Musculoskeletal:         General: Tenderness present.   Neurological:      General: No focal deficit present.      Mental Status: She is alert.   Psychiatric:         Mood and Affect: Mood normal.         Behavior: Behavior normal.       Eugenio Schmidt MD    "

## 2024-05-01 NOTE — TELEPHONE ENCOUNTER
Left message for patient to call office and schedule with Dr. SONIA Johnson in about 4 weeks (around 5/29/2024) for f/u 15 minutes HTN.

## 2024-05-01 NOTE — ASSESSMENT & PLAN NOTE
Lab Results   Component Value Date    HGBA1C 5.9 (H) 06/23/2023    HGBA1C 6.0 (H) 12/03/2021    HGBA1C 6.0 (H) 03/13/2020     Lab Results   Component Value Date    GLUF 103 (H) 04/25/2024    LDLCALC 81 04/25/2024    CREATININE 0.75 04/25/2024     Most recent fasting glucose of 103  Counseled on importance of regular exercise and healthy diet

## 2024-05-01 NOTE — ASSESSMENT & PLAN NOTE
Continues to follow with pain management.  MRI did show severe central stenosis at L4-L5.  Methocarbamol as needed does help with sleep.  Did not respond to last epidural injection 1 month ago.  Continues to take ibuprofen anywhere from 2-5 daily.  Recommend avoiding ibuprofen due to elevated blood pressure.  With underlying pain we can trial patient on scheduled Tylenol 1000 mg 3 times daily.  This would not help with inflammation but this could help control some of her pain.

## 2024-05-01 NOTE — ASSESSMENT & PLAN NOTE
Lab Results   Component Value Date    CHOLESTEROL 157 04/25/2024    HDL 47 (L) 04/25/2024    LDLCALC 81 04/25/2024    TRIG 146 04/25/2024     The 10-year ASCVD risk score (Gladis FERNANDEZ, et al., 2019) is: 10.1%    Values used to calculate the score:      Age: 68 years      Sex: Female      Is Non- : No      Diabetic: No      Tobacco smoker: No      Systolic Blood Pressure: 146 mmHg      Is BP treated: Yes      HDL Cholesterol: 47 mg/dL      Total Cholesterol: 157 mg/dL      Continue atorvastatin 20 mg

## 2024-05-05 DIAGNOSIS — I10 PRIMARY HYPERTENSION: ICD-10-CM

## 2024-05-05 DIAGNOSIS — G25.0 ESSENTIAL TREMOR: ICD-10-CM

## 2024-05-05 RX ORDER — NADOLOL 20 MG/1
30 TABLET ORAL
Qty: 135 TABLET | Refills: 1 | Status: SHIPPED | OUTPATIENT
Start: 2024-05-05

## 2024-05-06 NOTE — PROGRESS NOTES
Assessment:  1. Lumbar radiculopathy    2. Spinal stenosis at L4-L5 level        Plan:  Patient will be scheduled for an L4-5 LESI to address the inflammatory component of the patient's pain.  Complete risks and benefits including bleeding, infection, tissue reaction, nerve injury and allergic reaction were discussed. The patient was agreeable and verbalized an understanding  May consider trial of gabapentin in the future  Once pain has improved, may consider physical therapy  Patient may continue methocarbamol as prescribed  Follow-up after procedure or sooner if needed    History of Present Illness:    The patient is a 68 y.o. female last seen on 02/12/2024  who presents for a follow up office visit in regards to chroni lumbosacral back pain that radiates into the posterolateral aspect of the lower extremities to the knees with associated paresthesias.  She denies bowel or bladder incontinence or saddle anesthesia.  Patient is status post bilateral L4 TFESI March 26, 2024 which provided 75% improvement of her pain for about a week before the pain returned to baseline.    The patient rates her pain a 7-8 out of 10 on the numeric pain rating scale.  Pain is intermittent in the morning and is described as dull aching, sharp and shooting.  Uses Tylenol as needed and over-the-counter Aleve      I have personally reviewed and/or updated the patient's past medical history, past surgical history, family history, social history, current medications, allergies, and vital signs today.       Review of Systems:    Review of Systems   Respiratory:  Negative for shortness of breath.    Cardiovascular:  Negative for chest pain.   Gastrointestinal:  Negative for constipation, diarrhea, nausea and vomiting.   Musculoskeletal:  Positive for back pain and gait problem. Negative for arthralgias, joint swelling and myalgias.   Skin:  Negative for rash.   Neurological:  Negative for dizziness, seizures and weakness.   All other systems  reviewed and are negative.        Past Medical History:   Diagnosis Date    Hypertension     Lymphadenopathy     LAST ASSESSED [id number]    Palpitations 12/08/2021       Past Surgical History:   Procedure Laterality Date    REDUCTION MAMMAPLASTY      TONSILLECTOMY         Family History   Problem Relation Age of Onset    Coronary artery disease Mother     Diabetes Mother     Hypertension Mother     Osteoporosis Mother     Diabetes Father     Diabetes Sister     Hypertension Sister     Hypertension Brother        Social History     Occupational History    Not on file   Tobacco Use    Smoking status: Never    Smokeless tobacco: Never   Vaping Use    Vaping status: Never Used   Substance and Sexual Activity    Alcohol use: Yes     Alcohol/week: 1.0 standard drink of alcohol     Types: 1 Cans of beer per week     Comment: social    Drug use: No    Sexual activity: Yes     Partners: Male     Birth control/protection: None         Current Outpatient Medications:     amLODIPine (NORVASC) 5 mg tablet, Take 1 tablet (5 mg total) by mouth daily, Disp: 30 tablet, Rfl: 5    atorvastatin (LIPITOR) 20 mg tablet, TAKE 1 TABLET BY MOUTH EVERY DAY, Disp: 90 tablet, Rfl: 1    betamethasone valerate (VALISONE) 0.1 % ointment, APPLY TO AFFECTED AREA TWICE A DAY, Disp: 30 g, Rfl: 0    Calcium Carbonate-Vitamin D 600-400 MG-UNIT per tablet, Take 1 tablet by mouth daily, Disp: , Rfl:     cetirizine (ZyrTEC) 5 MG chewable tablet, Chew 5 mg as needed, Disp: , Rfl:     Diclofenac Sodium (VOLTAREN) 1 %, Apply 2 g topically 4 (four) times a day, Disp: 100 g, Rfl: 0    lisinopril (ZESTRIL) 30 mg tablet, TAKE 1 TABLET BY MOUTH EVERY DAY, Disp: 90 tablet, Rfl: 1    methocarbamol (ROBAXIN) 500 mg tablet, Take 1 tablet (500 mg total) by mouth daily at bedtime as needed for muscle spasms, Disp: 60 tablet, Rfl: 1    Multiple Vitamins-Minerals (MULTIVITAMIN ADULT PO), Take 1 tablet by mouth daily, Disp: , Rfl:     nadolol (CORGARD) 20 mg tablet, TAKE  "1.5 TABLETS (30 MG TOTAL) BY MOUTH DAILY AT BEDTIME, Disp: 135 tablet, Rfl: 1    Restasis 0.05 % ophthalmic emulsion, ADMINISTER 1 DROP TO BOTH EYES AS NEEDED (DRY EYES), Disp: 180 mL, Rfl: 1    clotrimazole-betamethasone (LOTRISONE) 1-0.05 % cream, Apply topically 2 (two) times a day (Patient not taking: Reported on 4/19/2023), Disp: 30 g, Rfl: 0    fluticasone (FLONASE) 50 mcg/act nasal spray, 2 sprays into each nostril daily (Patient not taking: Reported on 6/15/2023), Disp: 9.9 g, Rfl: 0    sodium chloride (OCEAN) 0.65 % nasal spray, 1 spray into each nostril as needed for rhinitis for up to 30 days (Patient not taking: Reported on 10/19/2023), Disp: 10 mL, Rfl: 0    valACYclovir (VALTREX) 1,000 mg tablet, Take 1 tablet (1,000 mg total) by mouth 2 (two) times a day for 14 days (Patient not taking: Reported on 10/19/2023), Disp: 90 tablet, Rfl: 3    No Known Allergies    Physical Exam:    /78   Pulse 75   Ht 5' 7\" (1.702 m)   Wt 89.8 kg (198 lb)   BMI 31.01 kg/m²     Constitutional:normal, well developed, well nourished, alert, in no distress and non-toxic and no overt pain behavior.  Eyes:anicteric  HEENT:grossly intact  Neck:supple, symmetric, trachea midline and no masses   Pulmonary:even and unlabored  Cardiovascular:No edema or pitting edema present  Skin:Normal without rashes or lesions and well hydrated  Psychiatric:Mood and affect appropriate  Neurologic:Cranial Nerves II-XII grossly intact  Musculoskeletal:normal gait.  Positive seated straight leg raise bilaterally      Imaging  FL spine and pain procedure    (Results Pending)       Narrative & Impression  MRI LUMBAR SPINE WITHOUT CONTRAST     INDICATION: M54.16: Radiculopathy, lumbar region.     COMPARISON: Lumbar radiographs 6/29/2016     TECHNIQUE:  Multiplanar, multisequence imaging of the lumbar spine was performed. .        IMAGE QUALITY:  Diagnostic     FINDINGS:     VERTEBRAL BODIES:  There are 5 lumbar type vertebral bodies. Stable " grade 1 degenerative retrolisthesis is noted at the L1-2 level. Stable grade 1 degenerative anterolisthesis is noted at the L4-5 and L5-S1 levels. No compression fracture.    Marrow   signal heterogeneity which can be seen with underlying red marrow proliferation. Hemangiomas are noted within the T10 and L2-L5 vertebral bodies.     SACRUM: Mild marrow edema within the left sacral ala (series 7: 37) which can be seen as early sequela of an insufficiency fracture.     DISTAL CORD AND CONUS:  Normal size and signal within the distal cord and conus. The conus terminates at the L1-2 level.  The cauda equina nerve roots appear within normal limits.     PARASPINAL SOFT TISSUES:  Paraspinal soft tissues are unremarkable.     LOWER THORACIC DISC SPACES:  Normal disc height and signal.  No disc herniation, canal stenosis or foraminal narrowing.     LUMBAR DISC SPACES:     L1-2: Grade 1 degenerative retrolisthesis with mild disc bulge.  No central canal or  subarticular/lateral recess narrowing.  No neural foraminal stenosis.     L2-3: Mild to moderate diffuse disc bulge extending into the foraminal regions. Bilateral ligamentum flavum and facet hypertrophy. Mild central canal and bilateral subarticular/lateral recess narrowing.  No neural foraminal stenosis.     L3-4: Mild to moderate diffuse disc bulge extending into the foraminal regions. Mild central canal and bilateral subarticular/lateral recess narrowing. Mild bilateral neural foraminal stenosis.     L4-5: Grade 1 degenerative anterolisthesis with secondary unroofing of the posterior disc and mild to moderate diffuse disc bulge. Bilateral ligamentum flavum and facet hypertrophy. Moderate to severe central canal and bilateral subarticular/lateral   recess narrowing.  No neural foraminal stenosis.     L5-S1: Grade 1 degenerative anterolisthesis is noted with secondary diffuse disc bulge. Bilateral ligamentum flavum and facet hypertrophy. Superimposed right foraminal disc  protrusion is noted abutting the exiting right L5 nerve root. No central canal or    subarticular/lateral recess narrowing. Mild right neural foraminal stenosis.        IMPRESSION:     Stable grade 1 degenerative retrolisthesis is noted at the L1-2 level. Stable grade 1 degenerative anterolisthesis is noted at the L4-5 and L5-S1 levels.     Multilevel lumbar degenerative disc disease as detailed with moderate to severe central canal stenosis at the L4-5 level.     Mild marrow edema within the left sacral ala, suspected early sequela of an insufficiency fracture.     The study was marked in EPIC for immediate notification.    Orders Placed This Encounter   Procedures    FL spine and pain procedure

## 2024-05-07 ENCOUNTER — OFFICE VISIT (OUTPATIENT)
Dept: PAIN MEDICINE | Facility: CLINIC | Age: 69
End: 2024-05-07
Payer: COMMERCIAL

## 2024-05-07 VITALS
WEIGHT: 198 LBS | HEART RATE: 75 BPM | SYSTOLIC BLOOD PRESSURE: 167 MMHG | DIASTOLIC BLOOD PRESSURE: 78 MMHG | BODY MASS INDEX: 31.08 KG/M2 | HEIGHT: 67 IN

## 2024-05-07 DIAGNOSIS — M48.061 SPINAL STENOSIS AT L4-L5 LEVEL: ICD-10-CM

## 2024-05-07 DIAGNOSIS — M54.16 LUMBAR RADICULOPATHY: Primary | ICD-10-CM

## 2024-05-07 PROCEDURE — 99214 OFFICE O/P EST MOD 30 MIN: CPT | Performed by: NURSE PRACTITIONER

## 2024-05-07 PROCEDURE — G2211 COMPLEX E/M VISIT ADD ON: HCPCS | Performed by: NURSE PRACTITIONER

## 2024-05-23 DIAGNOSIS — I10 PRIMARY HYPERTENSION: ICD-10-CM

## 2024-05-24 RX ORDER — AMLODIPINE BESYLATE 5 MG/1
5 TABLET ORAL DAILY
Qty: 90 TABLET | Refills: 1 | Status: SHIPPED | OUTPATIENT
Start: 2024-05-24

## 2024-05-29 ENCOUNTER — HOSPITAL ENCOUNTER (OUTPATIENT)
Dept: RADIOLOGY | Facility: CLINIC | Age: 69
Discharge: HOME | End: 2024-05-29
Payer: COMMERCIAL

## 2024-05-29 VITALS
HEART RATE: 71 BPM | SYSTOLIC BLOOD PRESSURE: 146 MMHG | TEMPERATURE: 98.2 F | DIASTOLIC BLOOD PRESSURE: 87 MMHG | OXYGEN SATURATION: 99 % | RESPIRATION RATE: 18 BRPM

## 2024-05-29 DIAGNOSIS — M54.16 LUMBAR RADICULOPATHY: ICD-10-CM

## 2024-05-29 DIAGNOSIS — M48.061 SPINAL STENOSIS AT L4-L5 LEVEL: ICD-10-CM

## 2024-05-29 PROCEDURE — 62323 NJX INTERLAMINAR LMBR/SAC: CPT | Performed by: ANESTHESIOLOGY

## 2024-05-29 RX ORDER — METHYLPREDNISOLONE ACETATE 80 MG/ML
80 INJECTION, SUSPENSION INTRA-ARTICULAR; INTRALESIONAL; INTRAMUSCULAR; PARENTERAL; SOFT TISSUE ONCE
Status: COMPLETED | OUTPATIENT
Start: 2024-05-29 | End: 2024-05-29

## 2024-05-29 RX ADMIN — METHYLPREDNISOLONE ACETATE 80 MG: 80 INJECTION, SUSPENSION INTRA-ARTICULAR; INTRALESIONAL; INTRAMUSCULAR; SOFT TISSUE at 11:51

## 2024-05-29 RX ADMIN — IOHEXOL 1 ML: 300 INJECTION, SOLUTION INTRAVENOUS at 11:51

## 2024-05-29 NOTE — DISCHARGE INSTR - LAB
Epidural Steroid Injection   WHAT YOU NEED TO KNOW:   An epidural steroid injection (SCOTT) is a procedure to inject steroid medicine into the epidural space. The epidural space is between your spinal cord and vertebrae. Steroids reduce inflammation and fluid buildup in your spine that may be causing pain. You may be given pain medicine along with the steroids.          ACTIVITY  Do not drive or operate machinery today.  No strenuous activity today - bending, lifting, etc.  You may resume normal activites starting tomorrow - start slowly and as tolerated.  You may shower today, but no tub baths or hot tubs.  You may have numbness for several hours from the local anesthetic. Please use caution and common sense, especially with weight-bearing activities.    CARE OF THE INJECTION SITE  If you have soreness or pain, apply ice to the area today (20 minutes on/20 minutes off).  Starting tomorrow, you may use warm, moist heat or ice if needed.  You may have an increase or change in your discomfort for 36-48 hours after your treatment.  Apply ice and continue with any pain medication you have been prescribed.  Notify [facility] if you have any of the following: redness, drainage, swelling, headache, stiff neck or fever above 100°F.    SPECIAL INSTRUCTIONS  Our office will contact you in approximately 7 days for a progress report.    MEDICATIONS  Continue to take all routine medications.  Our office may have instructed you to hold some medications.    As no general anesthesia was used in today's procedure, you should not experience any side effects related to anesthesia.     If you are diabetic, the steroids used in today's injection may temporarily increase your blood sugar levels after the first few days after your injection. Please keep a close eye on your sugars and alert the doctor who manages your diabetes if your sugars are significantly high from your baseline or you are symptomatic.     If you have a  problem specifically related to your procedure, please call our office at [phone].  Problems not related to your procedure should be directed to your primary care physician.

## 2024-05-29 NOTE — H&P
History of Present Illness: The patient is a 68 y.o. female who presents with complaints of low back and leg pain.    Past Medical History:   Diagnosis Date    Hypertension     Lymphadenopathy     LAST ASSESSED [id number]    Palpitations 12/08/2021       Past Surgical History:   Procedure Laterality Date    REDUCTION MAMMAPLASTY      TONSILLECTOMY           Current Outpatient Medications:     amLODIPine (NORVASC) 5 mg tablet, TAKE 1 TABLET (5 MG TOTAL) BY MOUTH DAILY., Disp: 90 tablet, Rfl: 1    atorvastatin (LIPITOR) 20 mg tablet, TAKE 1 TABLET BY MOUTH EVERY DAY, Disp: 90 tablet, Rfl: 1    betamethasone valerate (VALISONE) 0.1 % ointment, APPLY TO AFFECTED AREA TWICE A DAY, Disp: 30 g, Rfl: 0    Calcium Carbonate-Vitamin D 600-400 MG-UNIT per tablet, Take 1 tablet by mouth daily, Disp: , Rfl:     cetirizine (ZyrTEC) 5 MG chewable tablet, Chew 5 mg as needed, Disp: , Rfl:     clotrimazole-betamethasone (LOTRISONE) 1-0.05 % cream, Apply topically 2 (two) times a day (Patient not taking: Reported on 4/19/2023), Disp: 30 g, Rfl: 0    Diclofenac Sodium (VOLTAREN) 1 %, Apply 2 g topically 4 (four) times a day, Disp: 100 g, Rfl: 0    fluticasone (FLONASE) 50 mcg/act nasal spray, 2 sprays into each nostril daily (Patient not taking: Reported on 6/15/2023), Disp: 9.9 g, Rfl: 0    lisinopril (ZESTRIL) 30 mg tablet, TAKE 1 TABLET BY MOUTH EVERY DAY, Disp: 90 tablet, Rfl: 1    methocarbamol (ROBAXIN) 500 mg tablet, Take 1 tablet (500 mg total) by mouth daily at bedtime as needed for muscle spasms, Disp: 60 tablet, Rfl: 1    Multiple Vitamins-Minerals (MULTIVITAMIN ADULT PO), Take 1 tablet by mouth daily, Disp: , Rfl:     nadolol (CORGARD) 20 mg tablet, TAKE 1.5 TABLETS (30 MG TOTAL) BY MOUTH DAILY AT BEDTIME, Disp: 135 tablet, Rfl: 1    Restasis 0.05 % ophthalmic emulsion, ADMINISTER 1 DROP TO BOTH EYES AS NEEDED (DRY EYES), Disp: 180 mL, Rfl: 1    sodium chloride (OCEAN) 0.65 % nasal spray, 1 spray into each nostril as needed  for rhinitis for up to 30 days (Patient not taking: Reported on 10/19/2023), Disp: 10 mL, Rfl: 0    valACYclovir (VALTREX) 1,000 mg tablet, Take 1 tablet (1,000 mg total) by mouth 2 (two) times a day for 14 days (Patient not taking: Reported on 10/19/2023), Disp: 90 tablet, Rfl: 3    No Known Allergies    Physical Exam:   Vitals:    05/29/24 1125   BP: 144/85   Pulse: 69   Resp: 20   Temp: 98.2 °F (36.8 °C)   SpO2: 99%     General: Awake, Alert, Oriented x 3, Mood and affect appropriate  Respiratory: Respirations even and unlabored  Cardiovascular: Peripheral pulses intact; no edema  Musculoskeletal Exam: antalgic gait    ASA Score: 2         Assessment:   1. Lumbar radiculopathy    2. Spinal stenosis at L4-L5 level        Plan: L4-5 PRISCILLA

## 2024-06-02 DIAGNOSIS — G25.0 ESSENTIAL TREMOR: ICD-10-CM

## 2024-06-02 RX ORDER — LISINOPRIL 30 MG/1
30 TABLET ORAL DAILY
Qty: 90 TABLET | Refills: 1 | Status: SHIPPED | OUTPATIENT
Start: 2024-06-02

## 2024-06-05 ENCOUNTER — TELEPHONE (OUTPATIENT)
Dept: PAIN MEDICINE | Facility: CLINIC | Age: 69
End: 2024-06-05

## 2024-06-05 NOTE — TELEPHONE ENCOUNTER
Patient Reports    60     %     improvement post injection    Pain Level  4   /10  Not taking any Tylenol

## 2024-06-12 NOTE — TELEPHONE ENCOUNTER
Caller: Mary  Doctor/office: Fabrice   #: [phone]    % of improvement: 70%  Pain Scale (1-10): 4/10

## 2024-06-24 NOTE — PROGRESS NOTES
Assessment:  1. Lumbar radiculopathy    2. Primary osteoarthritis of right knee        Plan:  We can repeat L4-5 LESI as needed.  I discussed with the patient that since there has been moderate to significant improvement in the pain symptoms, we will hold off on any repeat injections at this point in time. However, I reviewed with the patient that if their symptoms should return or worsen,  they should call our office to schedule to discuss repeating the injection.  Patient will be scheduled for a right knee injection under ultrasound guidance.  The use of direct ultrasound visualization of the needle (rather than a non-guided injection) is required for this procedure to ensure accurate injection placement so there can be diagnostic specificity when evaluating effectiveness of the injection, and for safety purposes to minimize risk of bleeding or injury to surrounding structures.  Patient may continue over-the-counter Advil and Tylenol as needed  Follow-up as needed    History of Present Illness:    The patient is a 68 y.o. female last seen on 05/07/2024  who presents for a follow up office visit in regards to chronic lumbosacral back pain with radiculopathy into the posterolateral aspect of the bilateral lower extremities.  She denies bowel or bladder incontinence or saddle anesthesia.  She is status post L4-5 LESI May 29, 2024 with an ongoing 50% improvement of her pain.  She does still have localized right knee pain.  She also has intermittent left lateral thigh numbness.  She states that this started 30+ years ago when she was pregnant.  Now the sensation is intermittent.    The patient rates her pain a 4 out of 10 on the numeric pain rating scale.  Pain is intermittent in the morning and is described as dull aching and pins-and-needles    I have personally reviewed and/or updated the patient's past medical history, past surgical history, family history, social history, current medications, allergies, and vital  signs today.       Review of Systems:    Review of Systems   Respiratory:  Negative for shortness of breath.    Cardiovascular:  Negative for chest pain.   Gastrointestinal:  Negative for constipation, diarrhea, nausea and vomiting.   Musculoskeletal:  Positive for back pain and gait problem. Negative for arthralgias, joint swelling and myalgias.   Skin:  Negative for rash.   Neurological:  Negative for dizziness, seizures and weakness.   All other systems reviewed and are negative.        Past Medical History:   Diagnosis Date    Hypertension     Lymphadenopathy     LAST ASSESSED [id number]    Palpitations 12/08/2021       Past Surgical History:   Procedure Laterality Date    REDUCTION MAMMAPLASTY      TONSILLECTOMY         Family History   Problem Relation Age of Onset    Coronary artery disease Mother     Diabetes Mother     Hypertension Mother     Osteoporosis Mother     Diabetes Father     Diabetes Sister     Hypertension Sister     Hypertension Brother        Social History     Occupational History    Not on file   Tobacco Use    Smoking status: Never    Smokeless tobacco: Never   Vaping Use    Vaping status: Never Used   Substance and Sexual Activity    Alcohol use: Yes     Alcohol/week: 1.0 standard drink of alcohol     Types: 1 Cans of beer per week     Comment: social    Drug use: No    Sexual activity: Yes     Partners: Male     Birth control/protection: None         Current Outpatient Medications:     amLODIPine (NORVASC) 5 mg tablet, TAKE 1 TABLET (5 MG TOTAL) BY MOUTH DAILY., Disp: 90 tablet, Rfl: 1    atorvastatin (LIPITOR) 20 mg tablet, TAKE 1 TABLET BY MOUTH EVERY DAY, Disp: 90 tablet, Rfl: 1    betamethasone valerate (VALISONE) 0.1 % ointment, APPLY TO AFFECTED AREA TWICE A DAY, Disp: 30 g, Rfl: 0    Calcium Carbonate-Vitamin D 600-400 MG-UNIT per tablet, Take 1 tablet by mouth daily, Disp: , Rfl:     lisinopril (ZESTRIL) 30 mg tablet, TAKE 1 TABLET BY MOUTH EVERY DAY, Disp: 90 tablet, Rfl: 1     "methocarbamol (ROBAXIN) 500 mg tablet, Take 1 tablet (500 mg total) by mouth daily at bedtime as needed for muscle spasms, Disp: 60 tablet, Rfl: 1    Multiple Vitamins-Minerals (MULTIVITAMIN ADULT PO), Take 1 tablet by mouth daily, Disp: , Rfl:     nadolol (CORGARD) 20 mg tablet, TAKE 1.5 TABLETS (30 MG TOTAL) BY MOUTH DAILY AT BEDTIME, Disp: 135 tablet, Rfl: 1    cetirizine (ZyrTEC) 5 MG chewable tablet, Chew 5 mg as needed, Disp: , Rfl:     clotrimazole-betamethasone (LOTRISONE) 1-0.05 % cream, Apply topically 2 (two) times a day (Patient not taking: Reported on 4/19/2023), Disp: 30 g, Rfl: 0    Diclofenac Sodium (VOLTAREN) 1 %, Apply 2 g topically 4 (four) times a day, Disp: 100 g, Rfl: 0    fluticasone (FLONASE) 50 mcg/act nasal spray, 2 sprays into each nostril daily (Patient not taking: Reported on 6/15/2023), Disp: 9.9 g, Rfl: 0    Restasis 0.05 % ophthalmic emulsion, ADMINISTER 1 DROP TO BOTH EYES AS NEEDED (DRY EYES), Disp: 180 mL, Rfl: 1    sodium chloride (OCEAN) 0.65 % nasal spray, 1 spray into each nostril as needed for rhinitis for up to 30 days (Patient not taking: Reported on 10/19/2023), Disp: 10 mL, Rfl: 0    valACYclovir (VALTREX) 1,000 mg tablet, Take 1 tablet (1,000 mg total) by mouth 2 (two) times a day for 14 days (Patient not taking: Reported on 10/19/2023), Disp: 90 tablet, Rfl: 3    No Known Allergies    Physical Exam:    /77   Pulse 73   Ht 5' 7\" (1.702 m)   Wt 89.4 kg (197 lb)   BMI 30.85 kg/m²     Constitutional:normal, well developed, well nourished, alert, in no distress and non-toxic and no overt pain behavior.  Eyes:anicteric  HEENT:grossly intact  Neck:supple, symmetric, trachea midline and no masses   Pulmonary:even and unlabored  Cardiovascular:No edema or pitting edema present  Skin:Normal without rashes or lesions and well hydrated  Psychiatric:Mood and affect appropriate  Neurologic:Cranial Nerves II-XII grossly intact  Musculoskeletal:normal gait. Right anterolateral " knee tender to palpation.       Imaging  No orders to display         No orders of the defined types were placed in this encounter.

## 2024-06-25 ENCOUNTER — OFFICE VISIT (OUTPATIENT)
Dept: PAIN MEDICINE | Facility: CLINIC | Age: 69
End: 2024-06-25
Payer: COMMERCIAL

## 2024-06-25 ENCOUNTER — TELEPHONE (OUTPATIENT)
Dept: PAIN MEDICINE | Facility: CLINIC | Age: 69
End: 2024-06-25

## 2024-06-25 VITALS
SYSTOLIC BLOOD PRESSURE: 126 MMHG | BODY MASS INDEX: 30.92 KG/M2 | HEART RATE: 73 BPM | WEIGHT: 197 LBS | HEIGHT: 67 IN | DIASTOLIC BLOOD PRESSURE: 77 MMHG

## 2024-06-25 DIAGNOSIS — M54.16 LUMBAR RADICULOPATHY: Primary | ICD-10-CM

## 2024-06-25 DIAGNOSIS — M17.11 PRIMARY OSTEOARTHRITIS OF RIGHT KNEE: ICD-10-CM

## 2024-06-25 PROCEDURE — 99214 OFFICE O/P EST MOD 30 MIN: CPT | Performed by: NURSE PRACTITIONER

## 2024-06-28 NOTE — TELEPHONE ENCOUNTER
Caller: Mary     Doctor: Fabrice     Reason for call: Patient calling back to schedule knee injection please advise     Call back#: [phone]

## 2024-07-25 ENCOUNTER — OFFICE VISIT (OUTPATIENT)
Dept: FAMILY MEDICINE CLINIC | Facility: CLINIC | Age: 69
End: 2024-07-25
Payer: COMMERCIAL

## 2024-07-25 VITALS
SYSTOLIC BLOOD PRESSURE: 128 MMHG | TEMPERATURE: 97.6 F | HEART RATE: 66 BPM | WEIGHT: 201 LBS | HEIGHT: 67 IN | BODY MASS INDEX: 31.55 KG/M2 | RESPIRATION RATE: 18 BRPM | DIASTOLIC BLOOD PRESSURE: 74 MMHG | OXYGEN SATURATION: 98 %

## 2024-07-25 DIAGNOSIS — R05.1 ACUTE COUGH: Primary | ICD-10-CM

## 2024-07-25 PROCEDURE — 99213 OFFICE O/P EST LOW 20 MIN: CPT | Performed by: FAMILY MEDICINE

## 2024-07-25 PROCEDURE — G2211 COMPLEX E/M VISIT ADD ON: HCPCS | Performed by: FAMILY MEDICINE

## 2024-07-25 RX ORDER — OMEPRAZOLE 20 MG/1
20 CAPSULE, DELAYED RELEASE ORAL
Qty: 30 CAPSULE | Refills: 0 | Status: SHIPPED | OUTPATIENT
Start: 2024-07-25 | End: 2024-08-24

## 2024-07-25 NOTE — PROGRESS NOTES
"Ambulatory Visit  Name: Mary Lopez      : 1955      MRN: [id number]  Encounter Provider: Eugenio Schmidt MD  Encounter Date: 2024   Encounter department: [facility]    Assessment & Plan   1. Acute cough    Physical exam unremarkable.  Lungs are clear.  Plan to start Flonase and Zyrtec and monitor symptoms.  Will then consider adding acid reflux medications for silent reflux.  If no response to nasal spray and acid reflux medication can consider chest x-ray.     History of Present Illness     Patient presents with:  Cough: Chest congestion, Productive cough clearish to cloudy on & off , x 1 1/2 weeks ( Hx every July for past 3 yrs)    Patient reports his cough comes every July.  He is endorsing chest congestion and productive cough which is clear on and off for the past 2 weeks.  Does not feel acutely ill.  Denies any fevers.  Cough is worse in the morning and at the end of the day.  Does endorse nasal congestion without headache.  Denies any shortness of breath.     Cough        Review of Systems   Respiratory:  Positive for cough.        Objective     /74 (BP Location: Left arm, Patient Position: Sitting, Cuff Size: Large)   Pulse 66   Temp 97.6 °F (36.4 °C)   Resp 18   Ht 5' 7\" (1.702 m)   Wt 91.2 kg (201 lb)   SpO2 98%   BMI 31.48 kg/m²     Physical Exam  Vitals and nursing note reviewed.   Constitutional:       Appearance: Normal appearance. She is well-developed.   HENT:      Head: Normocephalic and atraumatic.   Cardiovascular:      Rate and Rhythm: Normal rate and regular rhythm.   Pulmonary:      Effort: Pulmonary effort is normal.      Breath sounds: Normal breath sounds.   Abdominal:      General: Bowel sounds are normal.      Palpations: Abdomen is soft.   Musculoskeletal:      Cervical back: Normal range of motion.   Skin:     General: Skin is warm.   Neurological:      General: No focal deficit present.      Mental Status: She is alert. "   Psychiatric:         Mood and Affect: Mood normal.         Speech: Speech normal.       Administrative Statements

## 2024-08-07 DIAGNOSIS — E78.2 MIXED HYPERLIPIDEMIA: ICD-10-CM

## 2024-08-07 RX ORDER — ATORVASTATIN CALCIUM 20 MG/1
20 TABLET, FILM COATED ORAL DAILY
Qty: 100 TABLET | Refills: 1 | Status: SHIPPED | OUTPATIENT
Start: 2024-08-07

## 2024-08-17 DIAGNOSIS — R05.1 ACUTE COUGH: ICD-10-CM

## 2024-08-23 ENCOUNTER — TELEPHONE (OUTPATIENT)
Age: 69
End: 2024-08-23

## 2024-08-23 NOTE — TELEPHONE ENCOUNTER
Caller: sana    Doctor: stephanie    Reason for call: pt would like to  get another low back injection.    Call back#: [phone]

## 2024-08-23 NOTE — TELEPHONE ENCOUNTER
Looks like her last injection was on 5/29/24 of an L4-L5 LESI.  She stated she did have pretty good relief with it. She stated her pain is slowly returning and it is in the same area as previous. She denies taking anticoagulants. Permission to schedule another injection or would you rather see her in for an  OVS? Please advise. Thanks

## 2024-08-27 DIAGNOSIS — M54.16 LUMBAR RADICULOPATHY: Primary | ICD-10-CM

## 2024-08-27 DIAGNOSIS — I10 PRIMARY HYPERTENSION: ICD-10-CM

## 2024-08-27 NOTE — TELEPHONE ENCOUNTER
Called patient schedule procedure   Reviewed all instructions by phone upon scheduling  Mailed copy to home

## 2024-08-28 RX ORDER — AMLODIPINE BESYLATE 5 MG/1
5 TABLET ORAL DAILY
Qty: 90 TABLET | Refills: 1 | Status: SHIPPED | OUTPATIENT
Start: 2024-08-28

## 2024-09-05 ENCOUNTER — PROCEDURE VISIT (OUTPATIENT)
Dept: PAIN MEDICINE | Facility: CLINIC | Age: 69
End: 2024-09-05
Payer: COMMERCIAL

## 2024-09-05 VITALS
SYSTOLIC BLOOD PRESSURE: 161 MMHG | HEART RATE: 66 BPM | BODY MASS INDEX: 31.86 KG/M2 | HEIGHT: 67 IN | DIASTOLIC BLOOD PRESSURE: 89 MMHG | WEIGHT: 203 LBS

## 2024-09-05 DIAGNOSIS — M17.11 PRIMARY OSTEOARTHRITIS OF RIGHT KNEE: Primary | ICD-10-CM

## 2024-09-05 PROCEDURE — 20611 DRAIN/INJ JOINT/BURSA W/US: CPT | Performed by: ANESTHESIOLOGY

## 2024-09-05 RX ORDER — METHYLPREDNISOLONE ACETATE 40 MG/ML
40 INJECTION, SUSPENSION INTRA-ARTICULAR; INTRALESIONAL; INTRAMUSCULAR; SOFT TISSUE ONCE
Status: COMPLETED | OUTPATIENT
Start: 2024-09-05 | End: 2024-09-05

## 2024-09-05 RX ORDER — ROPIVACAINE HYDROCHLORIDE 2 MG/ML
40 INJECTION, SOLUTION EPIDURAL; INFILTRATION; PERINEURAL ONCE
Status: COMPLETED | OUTPATIENT
Start: 2024-09-05 | End: 2024-09-05

## 2024-09-05 RX ADMIN — METHYLPREDNISOLONE ACETATE 40 MG: 40 INJECTION, SUSPENSION INTRA-ARTICULAR; INTRALESIONAL; INTRAMUSCULAR; SOFT TISSUE at 13:52

## 2024-09-05 RX ADMIN — ROPIVACAINE HYDROCHLORIDE 40 MG: 2 INJECTION, SOLUTION EPIDURAL; INFILTRATION; PERINEURAL at 13:53

## 2024-09-05 NOTE — PROGRESS NOTES
"Large joint arthrocentesis: R knee  [facility] Protocol:  procedure performed by consultantConsent: Verbal consent obtained. Written consent obtained.  Risks and benefits: risks, benefits and alternatives were discussed  Consent given by: patient  Time out: Immediately prior to procedure a \"time out\" was called to verify the correct patient, procedure, equipment, support staff and site/side marked as required.  Timeout called at: 9/5/2024 1:34 PM.  Patient understanding: patient states understanding of the procedure being performed  Patient consent: the patient's understanding of the procedure matches consent given  Procedure consent: procedure consent matches procedure scheduled  Site marked: the operative site was marked  Required items: required blood products, implants, devices, and special equipment available  Patient identity confirmed: verbally with patient  Supporting Documentation  Indications: pain   Procedure Details  Location: knee - R knee  Preparation: Patient was prepped and draped in the usual sterile fashion  Needle size: 25 G  Ultrasound guidance: yes          Indication: Right knee pain  Preoperative diagnosis: Right knee osteoarthritis  Postoperative diagnosis: Right knee osteoarthritis  Procedure: Ultrasound guided right knee injection  After discussing the risks, benefits, and alternatives to the procedure, the patient expressed understanding and wished to proceed. The patient was brought to the procedure suite and placed in the supine position. A procedural pause was conducted to verify: Correct patient identity, procedure to be performed and as applicable, correct side and site, correct patient position, and avalability of implants, special equipment or special requirements. A simple surgical tray was used. Prior to the procedure, the right knee was examined with a 12 MHz linear transducer to visualize the right knee and determine the optimal needle path. Following this, the area was prepped " with ChloraPrep scrub, then reexamined using the same transducer, a sterile ultrasound transducer cover, and sterile ultrasound transducer gel. Thereafter, using ultrasound guidance, a 2.5 inch 22-gauge needle was advanced into the right knee joint in the suprapatellar recess of the joint space. After visualization of the tip and negative aspiration for blood, a mixture of 40 mg of Depo-Medrol in 3 mL of 0.2% ropivacaine was injected into the right knee joint. The patient tolerated the procedure well and there were no apparent complications. After an appropriate amount of observation, the patient was dismissed from the recovery area in good condition under their own power.  The patient received a total steroid dose of 40 mg of Depo-Medrol.

## 2024-09-16 ENCOUNTER — OFFICE VISIT (OUTPATIENT)
Dept: FAMILY MEDICINE CLINIC | Facility: CLINIC | Age: 69
End: 2024-09-16
Payer: COMMERCIAL

## 2024-09-16 ENCOUNTER — TELEPHONE (OUTPATIENT)
Age: 69
End: 2024-09-16

## 2024-09-16 VITALS
BODY MASS INDEX: 31.86 KG/M2 | SYSTOLIC BLOOD PRESSURE: 138 MMHG | DIASTOLIC BLOOD PRESSURE: 82 MMHG | WEIGHT: 203 LBS | HEIGHT: 67 IN | HEART RATE: 80 BPM | OXYGEN SATURATION: 98 % | TEMPERATURE: 97.5 F | RESPIRATION RATE: 18 BRPM

## 2024-09-16 DIAGNOSIS — L25.5 RHUS DERMATITIS: Primary | ICD-10-CM

## 2024-09-16 PROCEDURE — 99213 OFFICE O/P EST LOW 20 MIN: CPT | Performed by: FAMILY MEDICINE

## 2024-09-16 PROCEDURE — G2211 COMPLEX E/M VISIT ADD ON: HCPCS | Performed by: FAMILY MEDICINE

## 2024-09-16 RX ORDER — METHYLPREDNISOLONE 4 MG
TABLET, DOSE PACK ORAL
Qty: 21 EACH | Refills: 0 | Status: SHIPPED | OUTPATIENT
Start: 2024-09-16 | End: 2024-09-24

## 2024-09-16 NOTE — PROGRESS NOTES
"Ambulatory Visit  Name: Mary Lopez      : 1955      MRN: [id number]  Encounter Provider: Eugenio Schmidt MD  Encounter Date: 2024   Encounter department: [facility]    Assessment & Plan  Rhus dermatitis    Orders:    methylPREDNISolone 4 MG tablet therapy pack; Use as directed on package       Rash has more of a presentation of contact dermatitis versus hot tub folliculitis.  Plan to start patient on oral steroid and monitor response.      History of Present Illness     Patient presents with:  Rash: Neck area, lower back, close to groin area, itchy, patient noticed it Saturday, rash got worst .    Patient was in a hot tub over the weekend.  She was also hiking.    Rash          Review of Systems   Skin:  Positive for rash.           Objective     /82 (BP Location: Left arm, Patient Position: Sitting, Cuff Size: Standard)   Pulse 80   Temp 97.5 °F (36.4 °C) (Temporal)   Resp 18   Ht 5' 7\" (1.702 m)   Wt 92.1 kg (203 lb)   SpO2 98%   BMI 31.79 kg/m²     Physical Exam  Skin:     General: Skin is warm.      Findings: Erythema and rash present.         "

## 2024-09-16 NOTE — TELEPHONE ENCOUNTER
Caller: pt    Doctor: stephanie    Reason for call: pt needs to r/s her procedure.    Call back#: [phone]

## 2024-09-19 ENCOUNTER — TELEPHONE (OUTPATIENT)
Age: 69
End: 2024-09-19

## 2024-09-19 NOTE — TELEPHONE ENCOUNTER
Patient called in post OV 9/16 for rash over body to update provider post start of prednisone. Neck and lower back are improving; right  lower abdomen and hip have no improvement. Abdomen and hip areas worsened on Tuesday and remain the same. Patient wants to know what to do? Could she also have poison ivy? Please follow  up with patient for provider response for new orders, fu OV.

## 2024-09-20 DIAGNOSIS — L73.9 FOLLICULITIS: Primary | ICD-10-CM

## 2024-09-20 RX ORDER — CIPROFLOXACIN 500 MG/1
500 TABLET, FILM COATED ORAL EVERY 12 HOURS SCHEDULED
Qty: 14 TABLET | Refills: 0 | Status: SHIPPED | OUTPATIENT
Start: 2024-09-20 | End: 2024-09-27

## 2024-09-20 NOTE — TELEPHONE ENCOUNTER
Called patient and left message letting her know that her antibiotics where sent to the [facility] pharmacy on [address]

## 2024-09-23 NOTE — TELEPHONE ENCOUNTER
Pt called to say she is still taking the Cipro but finished the methylprednisolone yesterday. The rash is less red but it has spread. It is now moving onto her right hip. Benadryl is helping the itch. Please advise.

## 2024-09-24 ENCOUNTER — OFFICE VISIT (OUTPATIENT)
Dept: FAMILY MEDICINE CLINIC | Facility: CLINIC | Age: 69
End: 2024-09-24
Payer: COMMERCIAL

## 2024-09-24 VITALS
DIASTOLIC BLOOD PRESSURE: 82 MMHG | SYSTOLIC BLOOD PRESSURE: 132 MMHG | BODY MASS INDEX: 31.16 KG/M2 | WEIGHT: 198.5 LBS | HEIGHT: 67 IN | OXYGEN SATURATION: 98 % | RESPIRATION RATE: 18 BRPM | TEMPERATURE: 98.2 F | HEART RATE: 85 BPM

## 2024-09-24 DIAGNOSIS — L30.9 DERMATITIS: Primary | ICD-10-CM

## 2024-09-24 PROCEDURE — 99213 OFFICE O/P EST LOW 20 MIN: CPT | Performed by: FAMILY MEDICINE

## 2024-09-24 PROCEDURE — G2211 COMPLEX E/M VISIT ADD ON: HCPCS | Performed by: FAMILY MEDICINE

## 2024-09-24 RX ORDER — PREDNISONE 10 MG/1
TABLET ORAL
Qty: 30 TABLET | Refills: 0 | Status: SHIPPED | OUTPATIENT
Start: 2024-09-24 | End: 2024-10-05

## 2024-09-24 NOTE — PROGRESS NOTES
Ambulatory Visit  Name: Mary Lopez      : 1955      MRN: [id number]  Encounter Provider: Eugenio Schmidt MD  Encounter Date: 2024   Encounter department: [facility]    Assessment & Plan  Dermatitis  Continues to have erythematous rash with itching on the right flank and low back.  Initial improvement with Medrol Dosepak but is yet to go away.  Placed on prednisone taper and monitor response.  No signs of infection.  Orders:    predniSONE 10 mg tablet; Take 4 tablets (40 mg total) by mouth daily for 3 days, THEN 3 tablets (30 mg total) daily for 3 days, THEN 2 tablets (20 mg total) daily for 3 days, THEN 1 tablet (10 mg total) daily for 3 days.         History of Present Illness     Patient presents with:  Rash: Spreading, medication not helping      Some additional improvement with Medrol Dosepak but continues to spread.  Red and itching on her right side and low back.  No signs of infection no discharge.  For symptom currently is the itching.    Rash      Review of Systems   Skin:  Positive for rash.     Past Medical History:   Diagnosis Date    Allergic     seasonal allergies    Arthritis     back    Hypertension     Lymphadenopathy     LAST ASSESSED [id number]    Palpitations 2021    Visual impairment 1975    where glasses     Past Surgical History:   Procedure Laterality Date    REDUCTION MAMMAPLASTY      TONSILLECTOMY      TUBAL LIGATION       Family History   Problem Relation Age of Onset    Coronary artery disease Mother     Diabetes Mother     Hypertension Mother     Osteoporosis Mother     Dementia Mother             Arthritis Mother             Diabetes Father             Stroke Father             Diabetes Sister     Hypertension Sister     Hypertension Brother      Social History     Tobacco Use    Smoking status: Never    Smokeless tobacco: Never   Vaping Use    Vaping status: Never Used   Substance and Sexual Activity     Alcohol use: Yes     Alcohol/week: 2.0 standard drinks of alcohol     Types: 1 Glasses of wine, 1 Cans of beer per week     Comment: social    Drug use: No    Sexual activity: Yes     Partners: Male     Birth control/protection: Post-menopausal     Current Outpatient Medications on File Prior to Visit   Medication Sig    amLODIPine (NORVASC) 5 mg tablet TAKE 1 TABLET (5 MG TOTAL) BY MOUTH DAILY.    atorvastatin (LIPITOR) 20 mg tablet TAKE 1 TABLET BY MOUTH EVERY DAY    betamethasone valerate (VALISONE) 0.1 % ointment APPLY TO AFFECTED AREA TWICE A DAY    Calcium Carbonate-Vitamin D 600-400 MG-UNIT per tablet Take 1 tablet by mouth daily    cetirizine (ZyrTEC) 5 MG chewable tablet Chew 5 mg as needed    ciprofloxacin (CIPRO) 500 mg tablet Take 1 tablet (500 mg total) by mouth every 12 (twelve) hours for 7 days    clotrimazole-betamethasone (LOTRISONE) 1-0.05 % cream Apply topically 2 (two) times a day    Diclofenac Sodium (VOLTAREN) 1 % Apply 2 g topically 4 (four) times a day    fluticasone (FLONASE) 50 mcg/act nasal spray 2 sprays into each nostril daily    lisinopril (ZESTRIL) 30 mg tablet TAKE 1 TABLET BY MOUTH EVERY DAY    methocarbamol (ROBAXIN) 500 mg tablet Take 1 tablet (500 mg total) by mouth daily at bedtime as needed for muscle spasms    methylPREDNISolone 4 MG tablet therapy pack Use as directed on package    Multiple Vitamins-Minerals (MULTIVITAMIN ADULT PO) Take 1 tablet by mouth daily    nadolol (CORGARD) 20 mg tablet TAKE 1.5 TABLETS (30 MG TOTAL) BY MOUTH DAILY AT BEDTIME    Restasis 0.05 % ophthalmic emulsion ADMINISTER 1 DROP TO BOTH EYES AS NEEDED (DRY EYES)    sodium chloride (OCEAN) 0.65 % nasal spray 1 spray into each nostril as needed for rhinitis for up to 30 days    valACYclovir (VALTREX) 1,000 mg tablet Take 1 tablet (1,000 mg total) by mouth 2 (two) times a day for 14 days    [DISCONTINUED] omeprazole (PriLOSEC) 20 mg delayed release capsule Take 1 capsule (20 mg total) by mouth daily  "before breakfast     No Known Allergies  Immunization History   Administered Date(s) Administered    COVID-19 MODERNA VACC 0.5 ML IM 01/11/2021, 02/08/2021, 10/28/2021    COVID-19 Moderna mRNA Vaccine 12 Yr+ 50 mcg/0.5 mL (Spikevax) 10/05/2023    COVID-19 PFIZER VACCINE 0.3 ML IM 07/13/2022    COVID-19 Pfizer Vac BIVALENT Dylan-sucrose 12 Yr+ IM 10/26/2022    DTaP 5 01/01/2012    INFLUENZA 10/20/2010, 10/02/2021, 10/18/2022    Influenza Injectable, MDCK, Preservative Free, Quadrivalent, 0.5 mL 09/16/2020    Influenza Quadrivalent, 6-35 Months IM 11/22/2016, 11/21/2017    Influenza, high dose seasonal 0.7 mL 10/19/2023    Influenza, recombinant, quadrivalent,injectable, preservative free 11/20/2018, 10/10/2019, 10/18/2022    Pneumococcal Conjugate Vaccine 20-valent (Pcv20), Polysace 04/19/2023    Tdap 12/31/2020    Zoster Vaccine Recombinant 10/02/2021, 02/03/2022     Objective     /82 (BP Location: Left arm, Patient Position: Sitting, Cuff Size: Standard)   Pulse 85   Temp 98.2 °F (36.8 °C) (Temporal)   Resp 18   Ht 5' 7\" (1.702 m)   Wt 90 kg (198 lb 8 oz)   SpO2 98%   BMI 31.09 kg/m²     Physical Exam  Skin:     Findings: Erythema and rash present.         "

## 2024-09-24 NOTE — PATIENT INSTRUCTIONS
Zyrtec during the day.  Benadryl at bedtime.  Can also try Pepcid 20 mg twice daily for histamine response

## 2024-10-08 ENCOUNTER — HOSPITAL ENCOUNTER (OUTPATIENT)
Dept: RADIOLOGY | Facility: CLINIC | Age: 69
Discharge: HOME | End: 2024-10-08
Payer: COMMERCIAL

## 2024-10-08 VITALS
TEMPERATURE: 98 F | RESPIRATION RATE: 20 BRPM | OXYGEN SATURATION: 97 % | HEART RATE: 80 BPM | SYSTOLIC BLOOD PRESSURE: 135 MMHG | DIASTOLIC BLOOD PRESSURE: 83 MMHG

## 2024-10-08 DIAGNOSIS — M54.16 LUMBAR RADICULOPATHY: ICD-10-CM

## 2024-10-08 PROCEDURE — 62323 NJX INTERLAMINAR LMBR/SAC: CPT | Performed by: ANESTHESIOLOGY

## 2024-10-08 RX ORDER — METHYLPREDNISOLONE ACETATE 80 MG/ML
80 INJECTION, SUSPENSION INTRA-ARTICULAR; INTRALESIONAL; INTRAMUSCULAR; PARENTERAL; SOFT TISSUE ONCE
Status: COMPLETED | OUTPATIENT
Start: 2024-10-08 | End: 2024-10-08

## 2024-10-08 RX ADMIN — METHYLPREDNISOLONE ACETATE 80 MG: 80 INJECTION, SUSPENSION INTRA-ARTICULAR; INTRALESIONAL; INTRAMUSCULAR; SOFT TISSUE at 13:31

## 2024-10-08 RX ADMIN — IOHEXOL 1 ML: 300 INJECTION, SOLUTION INTRAVENOUS at 13:31

## 2024-10-08 NOTE — H&P
History of Present Illness: The patient is a 68 y.o. female who presents with complaints of low back and leg pain.    Past Medical History:   Diagnosis Date    Allergic     seasonal allergies    Arthritis     back    Hypertension     Lymphadenopathy     LAST ASSESSED [id number]    Palpitations 12/08/2021    Visual impairment 1975    where glasses       Past Surgical History:   Procedure Laterality Date    REDUCTION MAMMAPLASTY      TONSILLECTOMY      TUBAL LIGATION  1993         Current Outpatient Medications:     amLODIPine (NORVASC) 5 mg tablet, TAKE 1 TABLET (5 MG TOTAL) BY MOUTH DAILY., Disp: 90 tablet, Rfl: 1    atorvastatin (LIPITOR) 20 mg tablet, TAKE 1 TABLET BY MOUTH EVERY DAY, Disp: 100 tablet, Rfl: 1    betamethasone valerate (VALISONE) 0.1 % ointment, APPLY TO AFFECTED AREA TWICE A DAY, Disp: 30 g, Rfl: 0    Calcium Carbonate-Vitamin D 600-400 MG-UNIT per tablet, Take 1 tablet by mouth daily, Disp: , Rfl:     cetirizine (ZyrTEC) 5 MG chewable tablet, Chew 5 mg as needed, Disp: , Rfl:     clotrimazole-betamethasone (LOTRISONE) 1-0.05 % cream, Apply topically 2 (two) times a day, Disp: 30 g, Rfl: 0    Diclofenac Sodium (VOLTAREN) 1 %, Apply 2 g topically 4 (four) times a day, Disp: 100 g, Rfl: 0    fluticasone (FLONASE) 50 mcg/act nasal spray, 2 sprays into each nostril daily, Disp: 9.9 g, Rfl: 0    lisinopril (ZESTRIL) 30 mg tablet, TAKE 1 TABLET BY MOUTH EVERY DAY, Disp: 90 tablet, Rfl: 1    methocarbamol (ROBAXIN) 500 mg tablet, Take 1 tablet (500 mg total) by mouth daily at bedtime as needed for muscle spasms, Disp: 60 tablet, Rfl: 1    Multiple Vitamins-Minerals (MULTIVITAMIN ADULT PO), Take 1 tablet by mouth daily, Disp: , Rfl:     nadolol (CORGARD) 20 mg tablet, TAKE 1.5 TABLETS (30 MG TOTAL) BY MOUTH DAILY AT BEDTIME, Disp: 135 tablet, Rfl: 1    Restasis 0.05 % ophthalmic emulsion, ADMINISTER 1 DROP TO BOTH EYES AS NEEDED (DRY EYES), Disp: 180 mL, Rfl: 1    sodium chloride (OCEAN) 0.65 % nasal  spray, 1 spray into each nostril as needed for rhinitis for up to 30 days, Disp: 10 mL, Rfl: 0    valACYclovir (VALTREX) 1,000 mg tablet, Take 1 tablet (1,000 mg total) by mouth 2 (two) times a day for 14 days, Disp: 90 tablet, Rfl: 3    No Known Allergies    Physical Exam:   Vitals:    10/08/24 1306   BP: 124/80   Pulse: 82   Temp: 98 °F (36.7 °C)   SpO2: 97%     General: Awake, Alert, Oriented x 3, Mood and affect appropriate  Respiratory: Respirations even and unlabored  Cardiovascular: Peripheral pulses intact; no edema  Musculoskeletal Exam: Normal gait    ASA Score: 2         Assessment:   1. Lumbar radiculopathy        Plan: L4-L5 LESI

## 2024-10-08 NOTE — DISCHARGE INSTRUCTIONS
Epidural Steroid Injection   WHAT YOU NEED TO KNOW:   An epidural steroid injection (SCOTT) is a procedure to inject steroid medicine into the epidural space. The epidural space is between your spinal cord and vertebrae. Steroids reduce inflammation and fluid buildup in your spine that may be causing pain. You may be given pain medicine along with the steroids.          ACTIVITY  Do not drive or operate machinery today.  No strenuous activity today - bending, lifting, etc.  You may resume normal activites starting tomorrow - start slowly and as tolerated.  You may shower today, but no tub baths or hot tubs.  You may have numbness for several hours from the local anesthetic. Please use caution and common sense, especially with weight-bearing activities.    CARE OF THE INJECTION SITE  If you have soreness or pain, apply ice to the area today (20 minutes on/20 minutes off).  Starting tomorrow, you may use warm, moist heat or ice if needed.  You may have an increase or change in your discomfort for 36-48 hours after your treatment.  Apply ice and continue with any pain medication you have been prescribed.  Notify the [facility] if you have any of the following: redness, drainage, swelling, headache, stiff neck or fever above 100°F.    SPECIAL INSTRUCTIONS  Our office will contact you in approximately 14 days for a progress report.    MEDICATIONS  Continue to take all routine medications.  Our office may have instructed you to hold some medications.    As no general anesthesia was used in today's procedure, you should not experience any side effects related to anesthesia.     If you are diabetic, the steroids used in today's injection may temporarily increase your blood sugar levels after the first few days after your injection. Please keep a close eye on your sugars and alert the doctor who manages your diabetes if your sugars are significantly high from your baseline or you are symptomatic.     If you have a  problem specifically related to your procedure, please call our office at [phone].  Problems not related to your procedure should be directed to your primary care physician.

## 2024-10-19 DIAGNOSIS — G25.0 ESSENTIAL TREMOR: ICD-10-CM

## 2024-10-19 DIAGNOSIS — I10 PRIMARY HYPERTENSION: ICD-10-CM

## 2024-10-19 RX ORDER — LISINOPRIL 30 MG/1
30 TABLET ORAL DAILY
Qty: 90 TABLET | Refills: 1 | Status: SHIPPED | OUTPATIENT
Start: 2024-10-19

## 2024-10-19 RX ORDER — NADOLOL 20 MG/1
30 TABLET ORAL
Qty: 135 TABLET | Refills: 1 | Status: SHIPPED | OUTPATIENT
Start: 2024-10-19

## 2024-10-20 DIAGNOSIS — M54.16 LUMBAR RADICULOPATHY: ICD-10-CM

## 2024-10-22 ENCOUNTER — TELEPHONE (OUTPATIENT)
Dept: PAIN MEDICINE | Facility: CLINIC | Age: 69
End: 2024-10-22

## 2024-10-22 NOTE — TELEPHONE ENCOUNTER
Patient Reports    40     %     improvement post injection    Pain Level   5  /10  Feels not as successful as shot  in May

## 2024-10-22 NOTE — TELEPHONE ENCOUNTER
Refill must be reviewed and completed by the office or provider. The refill is unable to be approved or denied by the medication management team.    Cannot be delegated

## 2024-10-23 RX ORDER — METHOCARBAMOL 500 MG/1
500 TABLET, FILM COATED ORAL
Qty: 60 TABLET | Refills: 0 | Status: SHIPPED | OUTPATIENT
Start: 2024-10-23

## 2024-10-30 NOTE — TELEPHONE ENCOUNTER
Caller: Mary    Doctor: GRIS    Reason for call: pt will skip OV unless her pain gets worse     Call back#: [phone]

## 2024-11-20 ENCOUNTER — OFFICE VISIT (OUTPATIENT)
Dept: FAMILY MEDICINE CLINIC | Facility: CLINIC | Age: 69
End: 2024-11-20
Payer: COMMERCIAL

## 2024-11-20 VITALS
HEIGHT: 67 IN | TEMPERATURE: 98.6 F | WEIGHT: 202 LBS | DIASTOLIC BLOOD PRESSURE: 82 MMHG | OXYGEN SATURATION: 98 % | HEART RATE: 72 BPM | SYSTOLIC BLOOD PRESSURE: 132 MMHG | RESPIRATION RATE: 18 BRPM | BODY MASS INDEX: 31.71 KG/M2

## 2024-11-20 DIAGNOSIS — H10.9 BACTERIAL CONJUNCTIVITIS: Primary | ICD-10-CM

## 2024-11-20 PROCEDURE — G2211 COMPLEX E/M VISIT ADD ON: HCPCS | Performed by: FAMILY MEDICINE

## 2024-11-20 PROCEDURE — 99213 OFFICE O/P EST LOW 20 MIN: CPT | Performed by: FAMILY MEDICINE

## 2024-11-20 RX ORDER — POLYMYXIN B SULFATE AND TRIMETHOPRIM 1; 10000 MG/ML; [USP'U]/ML
1 SOLUTION OPHTHALMIC EVERY 4 HOURS
Qty: 10 ML | Refills: 0 | Status: SHIPPED | OUTPATIENT
Start: 2024-11-20 | End: 2024-11-22

## 2024-11-20 NOTE — PROGRESS NOTES
"Name: Mary Lopez      : 1955      MRN: [id number]  Encounter Provider: Eugenio Schmidt MD  Encounter Date: 2024   Encounter department: [facility]  :  Assessment & Plan  Bacterial conjunctivitis  Symptoms consistent with bacterial conjunctivitis.  Start antibiotic 1 drop to each eye every 4 hours for 7 to 10 days.  Follow-up if symptoms fail to improve.  Orders:    polymyxin b-trimethoprim (POLYTRIM) ophthalmic solution; Administer 1 drop to both eyes every 4 (four) hours for 10 days           History of Present Illness     Patient presents with:  Conjunctivitis: Both eyes    Patient presents today with what she believes is pinkeye in both eyes.  Her daughter and granddaughter have recently been diagnosed with pinkeye.  She has redness itching and discharge in both eyes.    Conjunctivitis   Associated symptoms include eye itching, eye discharge and eye redness. Pertinent negatives include no eye pain.     Review of Systems   Eyes:  Positive for discharge, redness and itching. Negative for pain.          Objective   /82 (BP Location: Left arm, Patient Position: Sitting, Cuff Size: Standard)   Pulse 72   Temp 98.6 °F (37 °C) (Temporal)   Resp 18   Ht 5' 7\" (1.702 m)   Wt 91.6 kg (202 lb)   SpO2 98%   BMI 31.64 kg/m²      Physical Exam  Eyes:      Conjunctiva/sclera:      Right eye: Right conjunctiva is injected. Exudate present.      Left eye: Left conjunctiva is injected. Exudate present.         "

## 2024-11-22 ENCOUNTER — TELEPHONE (OUTPATIENT)
Age: 69
End: 2024-11-22

## 2024-11-22 DIAGNOSIS — H10.9 BACTERIAL CONJUNCTIVITIS: Primary | ICD-10-CM

## 2024-11-22 DIAGNOSIS — I10 PRIMARY HYPERTENSION: ICD-10-CM

## 2024-11-22 RX ORDER — CIPROFLOXACIN HYDROCHLORIDE 3.5 MG/ML
1 SOLUTION/ DROPS TOPICAL EVERY 6 HOURS
Qty: 5 ML | Refills: 0 | Status: SHIPPED | OUTPATIENT
Start: 2024-11-22 | End: 2024-11-27

## 2024-11-22 RX ORDER — AMLODIPINE BESYLATE 5 MG/1
5 TABLET ORAL DAILY
Qty: 90 TABLET | Refills: 1 | Status: SHIPPED | OUTPATIENT
Start: 2024-11-22

## 2024-11-22 NOTE — TELEPHONE ENCOUNTER
Burning can be a side effect of the medication.  I will send a different medication to her pharmacy.  If no improvement over the weekend follow-up in office

## 2024-11-22 NOTE — TELEPHONE ENCOUNTER
Patient calling office in regards to her pink eye.  She was seen on 11/20 by Dr. Schmidt.  She stated her pink eye seems to be getting worse, but she also said that she came in RIGHT when symptoms began.  But her main concern is that the drops that were prescribed are burning her eyes.  She wants to know if this is normal.  She also stated that she started with a sore throat last night.  Would Dr. Schmidt like to see patient in office to be reevaluated?     Please call patient and let her know

## 2024-11-26 ENCOUNTER — OFFICE VISIT (OUTPATIENT)
Dept: FAMILY MEDICINE CLINIC | Facility: CLINIC | Age: 69
End: 2024-11-26
Payer: COMMERCIAL

## 2024-11-26 VITALS
SYSTOLIC BLOOD PRESSURE: 130 MMHG | BODY MASS INDEX: 31.71 KG/M2 | HEART RATE: 66 BPM | DIASTOLIC BLOOD PRESSURE: 78 MMHG | WEIGHT: 202 LBS | RESPIRATION RATE: 18 BRPM | OXYGEN SATURATION: 99 % | TEMPERATURE: 98.2 F | HEIGHT: 67 IN

## 2024-11-26 DIAGNOSIS — H10.9 BACTERIAL CONJUNCTIVITIS: Primary | ICD-10-CM

## 2024-11-26 PROCEDURE — 99213 OFFICE O/P EST LOW 20 MIN: CPT | Performed by: FAMILY MEDICINE

## 2024-11-26 PROCEDURE — G2211 COMPLEX E/M VISIT ADD ON: HCPCS | Performed by: FAMILY MEDICINE

## 2024-11-26 RX ORDER — TOBRAMYCIN AND DEXAMETHASONE 3; 1 MG/ML; MG/ML
1 SUSPENSION/ DROPS OPHTHALMIC
Qty: 5 ML | Refills: 0 | Status: SHIPPED | OUTPATIENT
Start: 2024-11-26

## 2024-11-26 NOTE — PROGRESS NOTES
"Name: Mary Lopez      : 1955      MRN: [id number]  Encounter Provider: Eugenio Schmidt MD  Encounter Date: 2024   Encounter department: [facility]  :  Assessment & Plan  Bacterial conjunctivitis  Continues to have redness and discharge from both eyes.  Did note some improvement with ciprofloxacin.  Daughter and granddaughter were given TobraDex which improved their symptoms.  She is requesting TobraDex.  Continue for another 5 days.  If no improvement follow-up with ophthalmology  Orders:    tobramycin-dexamethasone (TOBRADEX) ophthalmic suspension; Administer 1 drop to both eyes every 4 (four) hours while awake           History of Present Illness     Patient presents with:  Follow-up: Follow up Pinkeye Not better & now Sore throat since 24          Review of Systems   Eyes:  Positive for discharge and redness.          Objective   /78 (BP Location: Left arm, Patient Position: Sitting, Cuff Size: Large)   Pulse 66   Temp 98.2 °F (36.8 °C)   Resp 18   Ht 5' 7\" (1.702 m)   Wt 91.6 kg (202 lb)   SpO2 99%   BMI 31.64 kg/m²      Physical Exam  Vitals and nursing note reviewed.   Constitutional:       Appearance: Normal appearance. She is well-developed.   HENT:      Head: Normocephalic and atraumatic.   Eyes:      General:         Right eye: Discharge present.         Left eye: Discharge present.  Cardiovascular:      Rate and Rhythm: Normal rate and regular rhythm.   Pulmonary:      Effort: Pulmonary effort is normal.      Breath sounds: Normal breath sounds.   Abdominal:      General: Bowel sounds are normal.      Palpations: Abdomen is soft.   Musculoskeletal:      Cervical back: Normal range of motion.   Skin:     General: Skin is warm.   Neurological:      General: No focal deficit present.      Mental Status: She is alert.   Psychiatric:         Mood and Affect: Mood normal.         Speech: Speech normal.         "

## 2024-12-10 ENCOUNTER — OFFICE VISIT (OUTPATIENT)
Dept: FAMILY MEDICINE CLINIC | Facility: CLINIC | Age: 69
End: 2024-12-10
Payer: COMMERCIAL

## 2024-12-10 VITALS
HEIGHT: 67 IN | BODY MASS INDEX: 32.02 KG/M2 | SYSTOLIC BLOOD PRESSURE: 128 MMHG | HEART RATE: 85 BPM | DIASTOLIC BLOOD PRESSURE: 76 MMHG | OXYGEN SATURATION: 99 % | RESPIRATION RATE: 18 BRPM | WEIGHT: 204 LBS | TEMPERATURE: 97.6 F

## 2024-12-10 DIAGNOSIS — R73.03 PREDIABETES: ICD-10-CM

## 2024-12-10 DIAGNOSIS — I10 PRIMARY HYPERTENSION: Primary | ICD-10-CM

## 2024-12-10 DIAGNOSIS — M54.16 LUMBAR RADICULOPATHY: ICD-10-CM

## 2024-12-10 DIAGNOSIS — M48.061 SPINAL STENOSIS AT L4-L5 LEVEL: ICD-10-CM

## 2024-12-10 DIAGNOSIS — E78.2 MIXED HYPERLIPIDEMIA: ICD-10-CM

## 2024-12-10 DIAGNOSIS — Z00.00 MEDICARE ANNUAL WELLNESS VISIT, SUBSEQUENT: ICD-10-CM

## 2024-12-10 DIAGNOSIS — G25.0 ESSENTIAL TREMOR: ICD-10-CM

## 2024-12-10 PROBLEM — M75.102 TEAR OF LEFT ROTATOR CUFF: Status: RESOLVED | Noted: 2023-10-19 | Resolved: 2024-12-10

## 2024-12-10 PROBLEM — S46.219A TEAR OF BICEPS TENDON: Status: RESOLVED | Noted: 2023-10-19 | Resolved: 2024-12-10

## 2024-12-10 PROBLEM — B35.3 TINEA PEDIS OF BOTH FEET: Status: RESOLVED | Noted: 2021-06-09 | Resolved: 2024-12-10

## 2024-12-10 PROBLEM — L25.9 CONTACT DERMATITIS: Status: RESOLVED | Noted: 2023-06-15 | Resolved: 2024-12-10

## 2024-12-10 PROCEDURE — 99214 OFFICE O/P EST MOD 30 MIN: CPT | Performed by: FAMILY MEDICINE

## 2024-12-10 PROCEDURE — G0439 PPPS, SUBSEQ VISIT: HCPCS | Performed by: FAMILY MEDICINE

## 2024-12-10 NOTE — PROGRESS NOTES
Name: Mary Lopez      : 1955      MRN: [id number]  Encounter Provider: Edy Nevarez MD  Encounter Date: 12/10/2024   Encounter department: [facility]    Assessment & Plan  Primary hypertension    Blood pressures have been stable on Lisinopril 30 mg/day. Amlodipine 5 mg/day and Nadolol 20 mg daily ( on low dose beta-blocker for essential tremor)     BP Readings from Last 3 Encounters:   12/10/24 128/76   24 130/78   24 132/82           Lab Results   Component Value Date     2014    SODIUM 142 2024    K 4.8 2024     2024    CO2 29 2024    ANIONGAP 10 2014    AGAP 8 2024    BUN 25 2024    CREATININE 0.75 2024    GLUC 114 (H) 10/11/2022    GLUF 103 (H) 2024    CALCIUM 9.6 2024    AST 21 2024    ALT 29 2024    ALKPHOS 64 2024    PROT 7.2 2014    TP 6.6 2024    BILITOT 0.49 2014    TBILI 0.53 2024    EGFR 83 2024     Lab Results   Component Value Date    WBC 7.82 2024    HGB 13.1 2024    HCT 39.3 2024    MCV 89 2024     2024       Orders:    Comprehensive metabolic panel    CBC and differential    Mixed hyperlipidemia    On Atorvastatin 20 mg daily     Lab Results   Component Value Date    CHOLESTEROL 157 2024    CHOLESTEROL 155 2023    CHOLESTEROL 180 10/11/2022     Lab Results   Component Value Date    HDL 47 (L) 2024    HDL 45 (L) 2023    HDL 40 (L) 10/11/2022     Lab Results   Component Value Date    TRIG 146 2024    TRIG 175 (H) 2023    TRIG 189 (H) 10/11/2022     Lab Results   Component Value Date    LDLCALC 81 2024        Lab Results   Component Value Date    ALT 29 2024    AST 21 2024    ALKPHOS 64 2024    BILITOT 0.49 2014         Orders:    Lipid panel    Prediabetes      Lab Results   Component Value Date    GLUF 103 (H) 2024     Lab  "Results   Component Value Date    HGBA1C 5.9 (H) 06/23/2023       Orders:    Hemoglobin A1C    Lumbar radiculopathy    She is followed by Pain Management. 10/2024, 5/20/2024 and 3/20/2024 L4-L5 interlaminal epidural steroid injection. Prior PT. On no pain medications     02/2024 MRI lumbar spine     L4-5: Grade 1 degenerative anterolisthesis with secondary unroofing of the posterior disc and mild to moderate diffuse disc bulge. Bilateral ligamentum flavum and facet hypertrophy. Moderate to severe central canal and bilateral subarticular/lateral   recess narrowing.  No neural foraminal stenosis.     L5-S1: Grade 1 degenerative anterolisthesis is noted with secondary diffuse disc bulge. Bilateral ligamentum flavum and facet hypertrophy. Superimposed right foraminal disc protrusion is noted abutting the exiting right L5 nerve root. No central canal or    subarticular/lateral recess narrowing. Mild right neural foraminal stenosis.    Follow-up with pain management.  I discussed additional treatment options. She declined trial of Gabapentin. Information provided on vitamin supplement \"Sciaticare\"   She is not interested in Neurosurgery evaluation          Spinal stenosis at L4-L5 level         Essential tremor    Stable on Nadolol 20 mg daily. No FH tremor          Medicare annual wellness visit, subsequent           Depression Screening and Follow-up Plan: Patient was screened for depression during today's encounter. They screened negative with a PHQ-2 score of 0.      Preventive health issues were discussed with patient, and age appropriate screening tests were ordered as noted in patient's After Visit Summary. Personalized health advice and appropriate referrals for health education or preventive services given if needed, as noted in patient's After Visit Summary.    History of Present Illness     HPI   Patient Care Team:  Eugenio Schmidt MD as PCP - General (Family Medicine)    Review of Systems "   Constitutional:  Negative for appetite change, chills, fatigue, fever and unexpected weight change.   HENT:  Negative for congestion, ear pain, rhinorrhea, sore throat and trouble swallowing.    Eyes:  Negative for visual disturbance.   Respiratory:  Negative for cough, shortness of breath and wheezing.    Cardiovascular:  Negative for chest pain, palpitations and leg swelling.   Gastrointestinal:  Negative for abdominal pain, blood in stool, constipation, diarrhea, nausea and vomiting.   Genitourinary:  Negative for difficulty urinating.   Musculoskeletal:  Positive for back pain. Negative for arthralgias and myalgias.   Skin:  Negative for rash.   Neurological:  Negative for dizziness and headaches.   Hematological:  Negative for adenopathy. Does not bruise/bleed easily.   Psychiatric/Behavioral:  Negative for dysphoric mood and sleep disturbance.      Medical History Reviewed by provider this encounter:       Annual Wellness Visit Questionnaire   Mary is here for her Subsequent Wellness visit. Last Medicare Wellness visit information reviewed, patient interviewed and updates made to the record today.      Health Risk Assessment:   Patient rates overall health as very good. Patient feels that their physical health rating is same. Patient is very satisfied with their life. Eyesight was rated as same. Hearing was rated as same. Patient feels that their emotional and mental health rating is same. Patients states they are never, rarely angry. Patient states they are never, rarely unusually tired/fatigued. Pain experienced in the last 7 days has been some. Patient's pain rating has been 7/10. Patient states that she has experienced no weight loss or gain in last 6 months. My sciatia and back are painful at times    Depression Screening:   PHQ-2 Score: 0      Fall Risk Screening:   In the past year, patient has experienced: no history of falling in past year      Urinary Incontinence Screening:   Patient has not leaked  urine accidently in the last six months.     Home Safety:  Patient does not have trouble with stairs inside or outside of their home. Patient has working smoke alarms and has no working carbon monoxide detector. Home safety hazards include: none.     Nutrition:   Current diet is Regular and Limited junk food.     Medications:   Patient is currently taking over-the-counter supplements. OTC medications include: see medication list. Patient is able to manage medications.     Activities of Daily Living (ADLs)/Instrumental Activities of Daily Living (IADLs):   Walk and transfer into and out of bed and chair?: Yes  Dress and groom yourself?: Yes    Bathe or shower yourself?: Yes    Feed yourself? Yes  Do your laundry/housekeeping?: Yes  Manage your money, pay your bills and track your expenses?: Yes  Make your own meals?: Yes    Do your own shopping?: Yes    Previous Hospitalizations:   Any hospitalizations or ED visits within the last 12 months?: No      Advance Care Planning:   Living will: No    Durable POA for healthcare: No    Advanced directive: No      Cognitive Screening:   Provider or family/friend/caregiver concerned regarding cognition?: No    PREVENTIVE SCREENINGS      Cardiovascular Screening:    General: History Lipid Disorder and Screening Current      Diabetes Screening:     General: Screening Current      Colorectal Cancer Screening:     General: Screening Current      Breast Cancer Screening:     General: Screening Current      Cervical Cancer Screening:    General: Screening Not Indicated      Osteoporosis Screening:    General: Screening Current      Abdominal Aortic Aneurysm (AAA) Screening:        General: Screening Not Indicated      Lung Cancer Screening:     General: Screening Not Indicated      Hepatitis C Screening:    General: Screening Current    Screening, Brief Intervention, and Referral to Treatment (SBIRT)    Screening  Typical number of drinks in a day: 0  Typical number of drinks in a  "week: 1  Interpretation: Low risk drinking behavior.    AUDIT-C Screenin) How often did you have a drink containing alcohol in the past year? monthly or less  2) How many drinks did you have on a typical day when you were drinking in the past year? 1 to 2  3) How often did you have 6 or more drinks on one occasion in the past year? never    AUDIT-C Score: 1  Interpretation: Score 0-2 (female): Negative screen for alcohol misuse    Single Item Drug Screening:  How often have you used an illegal drug (including marijuana) or a prescription medication for non-medical reasons in the past year? never    Single Item Drug Screen Score: 0  Interpretation: Negative screen for possible drug use disorder    Brief Intervention  Alcohol & drug use screenings were reviewed. No concerns regarding substance use disorder identified.     Other Counseling Topics:   Calcium and vitamin D intake and regular weightbearing exercise.     Social Drivers of Health     Financial Resource Strain: Low Risk  (10/17/2023)    Overall Financial Resource Strain (CARDIA)     Difficulty of Paying Living Expenses: Not hard at all   Food Insecurity: No Food Insecurity (12/3/2024)    Hunger Vital Sign     Worried About Running Out of Food in the Last Year: Never true     Ran Out of Food in the Last Year: Never true   Transportation Needs: No Transportation Needs (12/3/2024)    PRAPARE - Transportation     Lack of Transportation (Medical): No     Lack of Transportation (Non-Medical): No   Housing Stability: Low Risk  (12/3/2024)    Housing Stability Vital Sign     Unable to Pay for Housing in the Last Year: No     Number of Times Moved in the Last Year: 0     Homeless in the Last Year: No   Utilities: Not At Risk (12/3/2024)    [facility] Utilities     Threatened with loss of utilities: No         Objective   /76 (BP Location: Left arm, Patient Position: Sitting, Cuff Size: Large)   Pulse 85   Temp 97.6 °F (36.4 °C)   Resp 18   Ht 5' 7\" (1.702 m)  "  Wt 92.5 kg (204 lb)   SpO2 99%   BMI 31.95 kg/m²     Wt Readings from Last 3 Encounters:   12/10/24 92.5 kg (204 lb)   11/26/24 91.6 kg (202 lb)   11/20/24 91.6 kg (202 lb)         Physical Exam  Constitutional:       General: She is not in acute distress.  Eyes:      General: No scleral icterus.     Extraocular Movements: Extraocular movements intact.      Conjunctiva/sclera: Conjunctivae normal.      Pupils: Pupils are equal, round, and reactive to light.   Neck:      Thyroid: No thyroid mass or thyromegaly.      Vascular: Normal carotid pulses. No carotid bruit.   Cardiovascular:      Rate and Rhythm: Normal rate and regular rhythm.      Heart sounds: No murmur heard.     No gallop.   Pulmonary:      Effort: Pulmonary effort is normal.      Breath sounds: Normal breath sounds. No wheezing or rales.   Musculoskeletal:      Lumbar back: No tenderness or bony tenderness. Decreased range of motion. Negative right straight leg raise test and negative left straight leg raise test.      Right lower leg: No edema.      Left lower leg: No edema.   Lymphadenopathy:      Cervical: No cervical adenopathy.   Skin:     Findings: No rash.   Neurological:      General: No focal deficit present.      Mental Status: She is alert and oriented to person, place, and time.      Motor: No weakness, tremor or abnormal muscle tone.      Deep Tendon Reflexes:      Reflex Scores:       Patellar reflexes are 1+ on the right side and 1+ on the left side.       Achilles reflexes are 0 on the right side and 0 on the left side.     Comments: Strength in lower extremities normal.  Dorsiflexion/plantarflexion normal.   Psychiatric:         Mood and Affect: Mood normal.

## 2024-12-10 NOTE — PATIENT INSTRUCTIONS
Medicare Preventive Visit Patient Instructions  Thank you for completing your Welcome to Medicare Visit or Medicare Annual Wellness Visit today. Your next wellness visit will be due in one year (12/11/2025).  The screening/preventive services that you may require over the next 5-10 years are detailed below. Some tests may not apply to you based off risk factors and/or age. Screening tests ordered at today's visit but not completed yet may show as past due. Also, please note that scanned in results may not display below.  Preventive Screenings:  Service Recommendations Previous Testing/Comments   Colorectal Cancer Screening  * Colonoscopy    * Fecal Occult Blood Test (FOBT)/Fecal Immunochemical Test (FIT)  * Fecal DNA/Cologuard Test  * Flexible Sigmoidoscopy Age: 45-75 years old   Colonoscopy: every 10 years (may be performed more frequently if at higher risk)  OR  FOBT/FIT: every 1 year  OR  Cologuard: every 3 years  OR  Sigmoidoscopy: every 5 years  Screening may be recommended earlier than age 45 if at higher risk for colorectal cancer. Also, an individualized decision between you and your healthcare provider will decide whether screening between the ages of 76-85 would be appropriate. Colonoscopy: 05/22/2018  FOBT/FIT: Not on file  Cologuard: Not on file  Sigmoidoscopy: Not on file          Breast Cancer Screening Age: 40+ years old  Frequency: every 1-2 years  Not required if history of left and right mastectomy Mammogram: 08/22/2024        Cervical Cancer Screening Between the ages of 21-29, pap smear recommended once every 3 years.   Between the ages of 30-65, can perform pap smear with HPV co-testing every 5 years.   Recommendations may differ for women with a history of total hysterectomy, cervical cancer, or abnormal pap smears in past. Pap Smear: 11/03/2020        Hepatitis C Screening Once for adults born between 1945 and 1965  More frequently in patients at high risk for Hepatitis C Hep C Antibody: Not  on file        Diabetes Screening 1-2 times per year if you're at risk for diabetes or have pre-diabetes Fasting glucose: 103 mg/dL (4/25/2024)  A1C: 5.9 % (6/23/2023)      Cholesterol Screening Once every 5 years if you don't have a lipid disorder. May order more often based on risk factors. Lipid panel: 04/25/2024          Other Preventive Screenings Covered by Medicare:  Abdominal Aortic Aneurysm (AAA) Screening: covered once if your at risk. You're considered to be at risk if you have a family history of AAA.  Lung Cancer Screening: covers low dose CT scan once per year if you meet all of the following conditions: (1) Age 55-77; (2) No signs or symptoms of lung cancer; (3) Current smoker or have quit smoking within the last 15 years; (4) You have a tobacco smoking history of at least 20 pack years (packs per day multiplied by number of years you smoked); (5) You get a written order from a healthcare provider.  Glaucoma Screening: covered annually if you're considered high risk: (1) You have diabetes OR (2) Family history of glaucoma OR (3)  aged 50 and older OR (4)  American aged 65 and older  Osteoporosis Screening: covered every 2 years if you meet one of the following conditions: (1) You're estrogen deficient and at risk for osteoporosis based off medical history and other findings; (2) Have a vertebral abnormality; (3) On glucocorticoid therapy for more than 3 months; (4) Have primary hyperparathyroidism; (5) On osteoporosis medications and need to assess response to drug therapy.   Last bone density test (DXA Scan): 02/07/2023.  HIV Screening: covered annually if you're between the age of 15-65. Also covered annually if you are younger than 15 and older than 65 with risk factors for HIV infection. For pregnant patients, it is covered up to 3 times per pregnancy.    Immunizations:  Immunization Recommendations   Influenza Vaccine Annual influenza vaccination during flu season is  recommended for all persons aged >= 6 months who do not have contraindications   Pneumococcal Vaccine   * Pneumococcal conjugate vaccine = PCV13 (Prevnar 13), PCV15 (Vaxneuvance), PCV20 (Prevnar 20)  * Pneumococcal polysaccharide vaccine = PPSV23 (Pneumovax) Adults 19-63 yo with certain risk factors or if 65+ yo  If never received any pneumonia vaccine: recommend Prevnar 20 (PCV20)  Give PCV20 if previously received 1 dose of PCV13 or PPSV23   Hepatitis B Vaccine 3 dose series if at intermediate or high risk (ex: diabetes, end stage renal disease, liver disease)   Respiratory syncytial virus (RSV) Vaccine - COVERED BY MEDICARE PART D  * RSVPreF3 (Arexvy) CDC recommends that adults 60 years of age and older may receive a single dose of RSV vaccine using shared clinical decision-making (SCDM)   Tetanus (Td) Vaccine - COST NOT COVERED BY MEDICARE PART B Following completion of primary series, a booster dose should be given every 10 years to maintain immunity against tetanus. Td may also be given as tetanus wound prophylaxis.   Tdap Vaccine - COST NOT COVERED BY MEDICARE PART B Recommended at least once for all adults. For pregnant patients, recommended with each pregnancy.   Shingles Vaccine (Shingrix) - COST NOT COVERED BY MEDICARE PART B  2 shot series recommended in those 19 years and older who have or will have weakened immune systems or those 50 years and older     Health Maintenance Due:      Topic Date Due   • Hepatitis C Screening  05/19/2025 (Originally 1955)   • Breast Cancer Screening: Mammogram  08/22/2025   • Colorectal Cancer Screening  05/22/2028   • Cervical Cancer Screening  Discontinued     Immunizations Due:  There are no preventive care reminders to display for this patient.  Advance Directives   What are advance directives?  Advance directives are legal documents that state your wishes and plans for medical care. These plans are made ahead of time in case you lose your ability to make  decisions for yourself. Advance directives can apply to any medical decision, such as the treatments you want, and if you want to donate organs.   What are the types of advance directives?  There are many types of advance directives, and each state has rules about how to use them. You may choose a combination of any of the following:  Living will:  This is a written record of the treatment you want. You can also choose which treatments you do not want, which to limit, and which to stop at a certain time. This includes surgery, medicine, IV fluid, and tube feedings.   Durable power of  for healthcare (DPAHC):  This is a written record that states who you want to make healthcare choices for you when you are unable to make them for yourself. This person, called a proxy, is usually a family member or a friend. You may choose more than 1 proxy.  Do not resuscitate (DNR) order:  A DNR order is used in case your heart stops beating or you stop breathing. It is a request not to have certain forms of treatment, such as CPR. A DNR order may be included in other types of advance directives.  Medical directive:  This covers the care that you want if you are in a coma, near death, or unable to make decisions for yourself. You can list the treatments you want for each condition. Treatment may include pain medicine, surgery, blood transfusions, dialysis, IV or tube feedings, and a ventilator (breathing machine).  Values history:  This document has questions about your views, beliefs, and how you feel and think about life. This information can help others choose the care that you would choose.  Why are advance directives important?  An advance directive helps you control your care. Although spoken wishes may be used, it is better to have your wishes written down. Spoken wishes can be misunderstood, or not followed. Treatments may be given even if you do not want them. An advance directive may make it easier for your family  to make difficult choices about your care.   Weight Management   Why it is important to manage your weight:  Being overweight increases your risk of health conditions such as heart disease, high blood pressure, type 2 diabetes, and certain types of cancer. It can also increase your risk for osteoarthritis, sleep apnea, and other respiratory problems. Aim for a slow, steady weight loss. Even a small amount of weight loss can lower your risk of health problems.  How to lose weight safely:  A safe and healthy way to lose weight is to eat fewer calories and get regular exercise. You can lose up about 1 pound a week by decreasing the number of calories you eat by 500 calories each day.   Healthy meal plan for weight management:  A healthy meal plan includes a variety of foods, contains fewer calories, and helps you stay healthy. A healthy meal plan includes the following:  Eat whole-grain foods more often.  A healthy meal plan should contain fiber. Fiber is the part of grains, fruits, and vegetables that is not broken down by your body. Whole-grain foods are healthy and provide extra fiber in your diet. Some examples of whole-grain foods are whole-wheat breads and pastas, oatmeal, brown rice, and bulgur.  Eat a variety of vegetables every day.  Include dark, leafy greens such as spinach, kale, roosevelt greens, and mustard greens. Eat yellow and orange vegetables such as carrots, sweet potatoes, and winter squash.   Eat a variety of fruits every day.  Choose fresh or canned fruit (canned in its own juice or light syrup) instead of juice. Fruit juice has very little or no fiber.  Eat low-fat dairy foods.  Drink fat-free (skim) milk or 1% milk. Eat fat-free yogurt and low-fat cottage cheese. Try low-fat cheeses such as mozzarella and other reduced-fat cheeses.  Choose meat and other protein foods that are low in fat.  Choose beans or other legumes such as split peas or lentils. Choose fish, skinless poultry (chicken or  turkey), or lean cuts of red meat (beef or pork). Before you cook meat or poultry, cut off any visible fat.   Use less fat and oil.  Try baking foods instead of frying them. Add less fat, such as margarine, sour cream, regular salad dressing and mayonnaise to foods. Eat fewer high-fat foods. Some examples of high-fat foods include french fries, doughnuts, ice cream, and cakes.  Eat fewer sweets.  Limit foods and drinks that are high in sugar. This includes candy, cookies, regular soda, and sweetened drinks.  Exercise:  Exercise at least 30 minutes per day on most days of the week. Some examples of exercise include walking, biking, dancing, and swimming. You can also fit in more physical activity by taking the stairs instead of the elevator or parking farther away from stores. Ask your healthcare provider about the best exercise plan for you.      © Copyright [employer] 2018 Information is for End User's use only and may not be sold, redistributed or otherwise used for commercial purposes. All illustrations and images included in CareNotes® are the copyrighted property of [employer]M., Inc. or [employer]

## 2024-12-10 NOTE — ASSESSMENT & PLAN NOTE
Lab Results   Component Value Date    GLUF 103 (H) 04/25/2024     Lab Results   Component Value Date    HGBA1C 5.9 (H) 06/23/2023       Orders:    Hemoglobin A1C

## 2024-12-10 NOTE — ASSESSMENT & PLAN NOTE
Blood pressures have been stable on Lisinopril 30 mg/day. Amlodipine 5 mg/day and Nadolol 20 mg daily ( on low dose beta-blocker for essential tremor)     BP Readings from Last 3 Encounters:   12/10/24 128/76   11/26/24 130/78   11/20/24 132/82           Lab Results   Component Value Date     07/30/2014    SODIUM 142 04/25/2024    K 4.8 04/25/2024     04/25/2024    CO2 29 04/25/2024    ANIONGAP 10 07/30/2014    AGAP 8 04/25/2024    BUN 25 04/25/2024    CREATININE 0.75 04/25/2024    GLUC 114 (H) 10/11/2022    GLUF 103 (H) 04/25/2024    CALCIUM 9.6 04/25/2024    AST 21 04/25/2024    ALT 29 04/25/2024    ALKPHOS 64 04/25/2024    PROT 7.2 07/30/2014    TP 6.6 04/25/2024    BILITOT 0.49 07/30/2014    TBILI 0.53 04/25/2024    EGFR 83 04/25/2024     Lab Results   Component Value Date    WBC 7.82 04/25/2024    HGB 13.1 04/25/2024    HCT 39.3 04/25/2024    MCV 89 04/25/2024     04/25/2024       Orders:    Comprehensive metabolic panel    CBC and differential

## 2024-12-10 NOTE — ASSESSMENT & PLAN NOTE
"  She is followed by Pain Management. 10/2024, 5/20/2024 and 3/20/2024 L4-L5 interlaminal epidural steroid injection. Prior PT. On no pain medications     02/2024 MRI lumbar spine     L4-5: Grade 1 degenerative anterolisthesis with secondary unroofing of the posterior disc and mild to moderate diffuse disc bulge. Bilateral ligamentum flavum and facet hypertrophy. Moderate to severe central canal and bilateral subarticular/lateral   recess narrowing.  No neural foraminal stenosis.     L5-S1: Grade 1 degenerative anterolisthesis is noted with secondary diffuse disc bulge. Bilateral ligamentum flavum and facet hypertrophy. Superimposed right foraminal disc protrusion is noted abutting the exiting right L5 nerve root. No central canal or    subarticular/lateral recess narrowing. Mild right neural foraminal stenosis.    Follow-up with pain management.  I discussed additional treatment options. She declined trial of Gabapentin. Information provided on vitamin supplement \"Sciaticare\"   She is not interested in Neurosurgery evaluation          "

## 2024-12-10 NOTE — ASSESSMENT & PLAN NOTE
On Atorvastatin 20 mg daily     Lab Results   Component Value Date    CHOLESTEROL 157 04/25/2024    CHOLESTEROL 155 06/23/2023    CHOLESTEROL 180 10/11/2022     Lab Results   Component Value Date    HDL 47 (L) 04/25/2024    HDL 45 (L) 06/23/2023    HDL 40 (L) 10/11/2022     Lab Results   Component Value Date    TRIG 146 04/25/2024    TRIG 175 (H) 06/23/2023    TRIG 189 (H) 10/11/2022     Lab Results   Component Value Date    LDLCALC 81 04/25/2024        Lab Results   Component Value Date    ALT 29 04/25/2024    AST 21 04/25/2024    ALKPHOS 64 04/25/2024    BILITOT 0.49 07/30/2014         Orders:    Lipid panel

## 2024-12-30 ENCOUNTER — TELEPHONE (OUTPATIENT)
Dept: PAIN MEDICINE | Facility: CLINIC | Age: 69
End: 2024-12-30

## 2024-12-30 NOTE — TELEPHONE ENCOUNTER
Injection type: L4-L5 LESI  Last injection date: 10/8/2024  Last office visit: 6/25/2024  Where is pain located: BL low back, buttocks, legs  Is the pain the same area as before: Yes  Current pain level: 8  How much % of relief did the last injection provide: 50%  Duration of relief from last injection: 6 weeks relief  When did pain return: 3 weeks ago  Is the pain effecting your ADLs:     Blood thinners/NSAIDS? no  Diabetes? No    Pt aware nurse to discuss with [facility], once okay given to repeat injection SPA  will reach out to pt to assist in scheduling. Pt verbalized understanding and appreciative of call.

## 2024-12-30 NOTE — TELEPHONE ENCOUNTER
Caller: Mary    Doctor: Fabrice    Reason for call: Patient ready to schedule next procedure also insurance will change Medicare and [employer] advantage 1/1/2025    Call back#: [phone]

## 2025-01-02 DIAGNOSIS — M54.16 LUMBAR RADICULOPATHY: Primary | ICD-10-CM

## 2025-01-02 NOTE — TELEPHONE ENCOUNTER
Called patient scheduled procedure  Reviewed all instructions by phone   Mailed copy to home    Also added updated ins infor

## 2025-01-30 DIAGNOSIS — E78.2 MIXED HYPERLIPIDEMIA: ICD-10-CM

## 2025-01-30 RX ORDER — ATORVASTATIN CALCIUM 20 MG/1
20 TABLET, FILM COATED ORAL DAILY
Qty: 90 TABLET | Refills: 1 | Status: SHIPPED | OUTPATIENT
Start: 2025-01-30

## 2025-02-02 DIAGNOSIS — G25.0 ESSENTIAL TREMOR: ICD-10-CM

## 2025-02-02 DIAGNOSIS — I10 PRIMARY HYPERTENSION: ICD-10-CM

## 2025-02-03 RX ORDER — NADOLOL 20 MG/1
30 TABLET ORAL
Qty: 135 TABLET | Refills: 1 | Status: SHIPPED | OUTPATIENT
Start: 2025-02-03

## 2025-02-04 ENCOUNTER — HOSPITAL ENCOUNTER (OUTPATIENT)
Dept: RADIOLOGY | Facility: CLINIC | Age: 70
Discharge: HOME | End: 2025-02-04
Payer: COMMERCIAL

## 2025-02-04 VITALS
SYSTOLIC BLOOD PRESSURE: 130 MMHG | TEMPERATURE: 98.6 F | RESPIRATION RATE: 18 BRPM | DIASTOLIC BLOOD PRESSURE: 68 MMHG | OXYGEN SATURATION: 99 % | HEART RATE: 71 BPM

## 2025-02-04 DIAGNOSIS — M54.16 LUMBAR RADICULOPATHY: ICD-10-CM

## 2025-02-04 PROCEDURE — 62323 NJX INTERLAMINAR LMBR/SAC: CPT | Performed by: ANESTHESIOLOGY

## 2025-02-04 RX ORDER — METHYLPREDNISOLONE ACETATE 80 MG/ML
80 INJECTION, SUSPENSION INTRA-ARTICULAR; INTRALESIONAL; INTRAMUSCULAR; PARENTERAL; SOFT TISSUE ONCE
Status: COMPLETED | OUTPATIENT
Start: 2025-02-04 | End: 2025-02-04

## 2025-02-04 RX ADMIN — IOHEXOL 1 ML: 300 INJECTION, SOLUTION INTRAVENOUS at 10:13

## 2025-02-04 RX ADMIN — METHYLPREDNISOLONE ACETATE 80 MG: 80 INJECTION, SUSPENSION INTRA-ARTICULAR; INTRALESIONAL; INTRAMUSCULAR; SOFT TISSUE at 10:16

## 2025-02-04 NOTE — H&P
History of Present Illness: The patient is a 69 y.o. female who presents with complaints of low back and leg pain.    Past Medical History:   Diagnosis Date    Allergic     seasonal allergies    Arthritis     back    Hypertension     Lymphadenopathy     LAST ASSESSED [id number]    Palpitations 12/08/2021    Tear of biceps tendon 10/19/2023    Left long head bicep. Near complete tear. MRI 2023      Tear of left rotator cuff 10/19/2023    Visual impairment 1975    where glasses       Past Surgical History:   Procedure Laterality Date    REDUCTION MAMMAPLASTY      TONSILLECTOMY      TUBAL LIGATION  1993         Current Outpatient Medications:     amLODIPine (NORVASC) 5 mg tablet, Take 1 tablet (5 mg total) by mouth daily, Disp: 90 tablet, Rfl: 1    atorvastatin (LIPITOR) 20 mg tablet, TAKE 1 TABLET BY MOUTH EVERY DAY, Disp: 90 tablet, Rfl: 1    Calcium Carbonate-Vitamin D 600-400 MG-UNIT per tablet, Take 1 tablet by mouth daily, Disp: , Rfl:     cetirizine (ZyrTEC) 5 MG chewable tablet, Chew 5 mg as needed, Disp: , Rfl:     Diclofenac Sodium (VOLTAREN) 1 %, Apply 2 g topically 4 (four) times a day (Patient taking differently: Apply 2 g topically if needed), Disp: 100 g, Rfl: 0    fluticasone (FLONASE) 50 mcg/act nasal spray, 2 sprays into each nostril daily (Patient taking differently: 2 sprays into each nostril as needed), Disp: 9.9 g, Rfl: 0    lisinopril (ZESTRIL) 30 mg tablet, TAKE 1 TABLET BY MOUTH EVERY DAY, Disp: 90 tablet, Rfl: 1    methocarbamol (ROBAXIN) 500 mg tablet, Take 1 tablet (500 mg total) by mouth daily at bedtime as needed for muscle spasms, Disp: 60 tablet, Rfl: 0    Multiple Vitamins-Minerals (MULTIVITAMIN ADULT PO), Take 1 tablet by mouth daily, Disp: , Rfl:     nadolol (CORGARD) 20 mg tablet, TAKE 1.5 TABLETS (30 MG TOTAL) BY MOUTH DAILY AT BEDTIME, Disp: 135 tablet, Rfl: 1    Restasis 0.05 % ophthalmic emulsion, ADMINISTER 1 DROP TO BOTH EYES AS NEEDED (DRY EYES) (Patient taking differently: as  needed), Disp: 180 mL, Rfl: 1    sodium chloride (OCEAN) 0.65 % nasal spray, 1 spray into each nostril as needed for rhinitis for up to 30 days, Disp: 10 mL, Rfl: 0    tobramycin-dexamethasone (TOBRADEX) ophthalmic suspension, Administer 1 drop to both eyes every 4 (four) hours while awake, Disp: 5 mL, Rfl: 0    valACYclovir (VALTREX) 1,000 mg tablet, Take 1 tablet (1,000 mg total) by mouth 2 (two) times a day for 14 days (Patient taking differently: Take 1,000 mg by mouth as needed), Disp: 90 tablet, Rfl: 3    No Known Allergies    Physical Exam:   Vitals:    02/04/25 0942   BP: 136/81   Pulse: 71   Resp: 20   Temp: 98.6 °F (37 °C)   SpO2: 98%     General: Awake, Alert, Oriented x 3, Mood and affect appropriate  Respiratory: Respirations even and unlabored  Cardiovascular: Peripheral pulses intact; no edema  Musculoskeletal Exam: normal gait    ASA Score: 2    Patient/Chart Verification  Patient ID Verified: Verbal  ID Band Applied: No  Consents Confirmed: To be obtained in the Procedural area  H&P( within 30 days) Verified: To be obtained in the Procedural area  Interval H&P(within 24 hr) Complete (required for Outpatients and Surgery Admit only): To be obtained in the Procedural area  Allergies Reviewed: Yes  Anticoag/NSAID held?: No  Currently on antibiotics?: No  Pregnancy Lab Collected: Negative    Assessment:   1. Lumbar radiculopathy        Plan: L4-5 PRISCILLA

## 2025-02-04 NOTE — DISCHARGE INSTR - LAB
Epidural Steroid Injection   WHAT YOU NEED TO KNOW:   An epidural steroid injection (SCOTT) is a procedure to inject steroid medicine into the epidural space. The epidural space is between your spinal cord and vertebrae. Steroids reduce inflammation and fluid buildup in your spine that may be causing pain. You may be given pain medicine along with the steroids.          ACTIVITY  Do not drive or operate machinery today.  No strenuous activity today - bending, lifting, etc.  You may resume normal activites starting tomorrow - start slowly and as tolerated.  You may shower today, but no tub baths or hot tubs.  You may have numbness for several hours from the local anesthetic. Please use caution and common sense, especially with weight-bearing activities.    CARE OF THE INJECTION SITE  If you have soreness or pain, apply ice to the area today (20 minutes on/20 minutes off).  Starting tomorrow, you may use warm, moist heat or ice if needed.  You may have an increase or change in your discomfort for 36-48 hours after your treatment.  Apply ice and continue with any pain medication you have been prescribed.  Notify the [facility] if you have any of the following: redness, drainage, swelling, headache, stiff neck or fever above 100°F.    SPECIAL INSTRUCTIONS  Our office will contact you in approximately 14 days for a progress report.    MEDICATIONS  Continue to take all routine medications.  Our office may have instructed you to hold some medications.    As no general anesthesia was used in today's procedure, you should not experience any side effects related to anesthesia.     If you are diabetic, the steroids used in today's injection may temporarily increase your blood sugar levels after the first few days after your injection. Please keep a close eye on your sugars and alert the doctor who manages your diabetes if your sugars are significantly high from your baseline or you are symptomatic.     If you have a  problem specifically related to your procedure, please call our office at [phone].  Problems not related to your procedure should be directed to your primary care physician.

## 2025-02-17 DIAGNOSIS — G25.0 ESSENTIAL TREMOR: ICD-10-CM

## 2025-02-18 ENCOUNTER — TELEPHONE (OUTPATIENT)
Dept: PAIN MEDICINE | Facility: CLINIC | Age: 70
End: 2025-02-18

## 2025-02-18 RX ORDER — LISINOPRIL 30 MG/1
30 TABLET ORAL DAILY
Qty: 90 TABLET | Refills: 0 | OUTPATIENT
Start: 2025-02-18

## 2025-04-29 ENCOUNTER — OFFICE VISIT (OUTPATIENT)
Dept: FAMILY MEDICINE CLINIC | Facility: CLINIC | Age: 70
End: 2025-04-29
Payer: COMMERCIAL

## 2025-04-29 VITALS
WEIGHT: 201 LBS | OXYGEN SATURATION: 97 % | DIASTOLIC BLOOD PRESSURE: 68 MMHG | RESPIRATION RATE: 16 BRPM | TEMPERATURE: 98 F | HEART RATE: 71 BPM | BODY MASS INDEX: 31.48 KG/M2 | SYSTOLIC BLOOD PRESSURE: 124 MMHG

## 2025-04-29 DIAGNOSIS — M85.80 OSTEOPENIA, UNSPECIFIED LOCATION: ICD-10-CM

## 2025-04-29 DIAGNOSIS — M85.88 OTHER SPECIFIED DISORDERS OF BONE DENSITY AND STRUCTURE, OTHER SITE: ICD-10-CM

## 2025-04-29 DIAGNOSIS — R21 RASH AND NONSPECIFIC SKIN ERUPTION: Primary | ICD-10-CM

## 2025-04-29 DIAGNOSIS — M54.16 LUMBAR RADICULOPATHY: ICD-10-CM

## 2025-04-29 PROCEDURE — G2211 COMPLEX E/M VISIT ADD ON: HCPCS

## 2025-04-29 PROCEDURE — 99214 OFFICE O/P EST MOD 30 MIN: CPT

## 2025-04-29 NOTE — PROGRESS NOTES
Name: Mary Lopez      : 1955      MRN: [id number]  Encounter Provider: Rose Murphy DO  Encounter Date: 2025   Encounter department: [facility]  :  Assessment & Plan  Rash and nonspecific skin eruption  Macular erythematous and dry rash on the underside of both arms that initially was noticed months ago by patient.  Nonpruritic so did not really notice them during the winter.  Has been using vitamin E oil twice a day for the last several days which helps with the erythema but otherwise no improvement.  No open lesions or bleeding.  Denies any new soaps, detergents, creams, etc. but did report starting a new natural deodorant several months ago.    Plan:  Recommend discontinuing natural deodorant and either holding off on deodorant for a week or using an agent that did not cause her issues in the past or one for sensitive skin  Recommend adequate moisturizer use with a thick emollient and if no improvement or resolution with that in 5 to 7 days, start OTC hydrocortisone twice a day for 7 to 14 days and reevaluate if no improvement or worsening       Osteopenia, unspecified location  Noted on previous DXA in . Was previously on Boniva. Repeat DXA at this time.     Orders:    DXA bone density spine hip and pelvis; Future    Other specified disorders of bone density and structure, other site  See notes under osteopenia.  Orders:    DXA bone density spine hip and pelvis; Future    Lumbar radiculopathy  Follows with pain management, Dr. Avina, has been getting steroid injections every 3 months but reports that they are no longer working as well as they used to.  Pain relief lasted about 5 to 6 weeks at maximum.  Has another appointment with pain management next week.         Follow-up for 6-month evaluation as scheduled, complete labs prior to.     History of Present Illness   HPI    Chief Complaint   Patient presents with    Rash     Back of both arms red a little dry not  itchy  A couple of months.  VIT E     Patient has noted rash on the underside of her arms several months ago, nonpruritic so she did not really take notice of it until she started wearing short sleeves again. Has been using vitamin E cream twice a day for the last week or so with some improvement in erythema but otherwise unremarkable.    Patient reports a history of lumbar radiculopathy, follows with pain management and getting steroid injections every 3 months but reports that they are not working as well as they used to.  Maybe last about 5 to 6 weeks at maximum.  Has a follow-up appointment next Thursday.  Patient does have a history of osteopenia, previously was on Boniva    Review of Systems   Constitutional:  Negative for chills and fever.   HENT:  Negative for congestion and rhinorrhea.    Eyes:  Negative for visual disturbance.   Respiratory:  Negative for cough and shortness of breath.    Cardiovascular:  Negative for chest pain and palpitations.   Gastrointestinal:  Negative for abdominal pain, constipation, diarrhea, nausea and vomiting.   Genitourinary:  Negative for dysuria.   Musculoskeletal:  Positive for back pain. Negative for arthralgias.   Skin:  Positive for rash.   Neurological:  Negative for dizziness, light-headedness and headaches.       Objective   /68 (BP Location: Left arm, Patient Position: Sitting, Cuff Size: Large)   Pulse 71   Temp 98 °F (36.7 °C) (Temporal)   Resp 16   Wt 91.2 kg (201 lb)   SpO2 97%   BMI 31.48 kg/m²      Physical Exam  Vitals reviewed.   Constitutional:       Appearance: Normal appearance.   HENT:      Head: Normocephalic and atraumatic.      Right Ear: External ear normal.      Left Ear: External ear normal.      Nose: Nose normal.      Mouth/Throat:      Pharynx: Oropharynx is clear.   Eyes:      Extraocular Movements: Extraocular movements intact.      Conjunctiva/sclera: Conjunctivae normal.   Cardiovascular:      Rate and Rhythm: Normal rate and  regular rhythm.      Pulses: Normal pulses.      Heart sounds: Normal heart sounds.   Pulmonary:      Effort: Pulmonary effort is normal.      Breath sounds: Normal breath sounds.   Abdominal:      Palpations: Abdomen is soft.   Musculoskeletal:      Cervical back: Neck supple.      Right lower leg: No edema.      Left lower leg: No edema.   Skin:     General: Skin is warm.      Comments: Macualr rash with dry skin, no lesions    Neurological:      Mental Status: She is alert and oriented to person, place, and time.   Psychiatric:         Mood and Affect: Mood normal.         Behavior: Behavior normal.         Thought Content: Thought content normal.         Judgment: Judgment normal.

## 2025-04-29 NOTE — ASSESSMENT & PLAN NOTE
Follows with pain management, Dr. Avina, has been getting steroid injections every 3 months but reports that they are no longer working as well as they used to.  Pain relief lasted about 5 to 6 weeks at maximum.  Has another appointment with pain management next week.

## 2025-04-30 DIAGNOSIS — G25.0 ESSENTIAL TREMOR: ICD-10-CM

## 2025-04-30 RX ORDER — LISINOPRIL 30 MG/1
30 TABLET ORAL DAILY
Qty: 30 TABLET | Refills: 0 | Status: SHIPPED | OUTPATIENT
Start: 2025-04-30

## 2025-05-07 NOTE — PROGRESS NOTES
Assessment  1. Spinal stenosis at L4-L5 level    2. Lumbar radiculopathy    3. Spinal stenosis of lumbar region, unspecified whether neurogenic claudication present    4. Lumbar spondylosis        Plan  69-year-old female with a history of lumbar spondylosis, stenosis, spondylolisthesis, lumbar radiculopathy, OA of the right knee returning for interval follow-up of lumbosacral back pain that radiates into the posterior aspect of bilateral lower extremities to the knees with associated paresthesias and numbness precipitated but with any significant distance of ambulation.  The patient had an L4-5 LESI February 4, 2025 which gave her about 50% of relief for 4 weeks.  She has undergone previous L4-5 LESI's with similar relief.  Bilateral L4 TFESI did not provide much relief.  She is currently taking ibuprofen as needed with some relief.  She does continue to do a home exercise program with only transient relief.    1.  I will schedule the patient for L5-S1 LESI  2.  I did offer the patient a trial of gabapentin, however she would like to hold off at this time  3.  I did offer the patient a referral to neurosurgery, however she would like to hold off on this at this time  4.  Patient will continue with HEP  5.  Patient will continue with ibuprofen 600 mg every 8 hours as needed  6.  I will follow-up with the patient 2 weeks after injection      Complete risks and benefits including bleeding, infection, tissue reaction, nerve injury and allergic reaction were discussed. The approach was demonstrated using models and literature was provided. Verbal and written consent was obtained.    My impressions and treatment recommendations were discussed in detail with the patient who verbalized understanding and had no further questions.  Discharge instructions were provided. I personally saw and examined the patient and I agree with the above discussed plan of care.    No orders of the defined types were placed in this  encounter.    No orders of the defined types were placed in this encounter.      History of Present Illness    Mary Lopez is a 69 y.o. female with a history of lumbar spondylosis, stenosis, spondylolisthesis, lumbar radiculopathy, OA of the right knee returning for interval follow-up of lumbosacral back pain that radiates into the posterior aspect of bilateral lower extremities to the knees with associated paresthesias and numbness precipitated but with any significant distance of ambulation.  She denies any bladder or bowel incontinence or saddle anesthesia.  The patient had an L4-5 LESI February 4, 2025 which gave her about 50% of relief for 4 weeks.  She has undergone previous L4-5 LESI's with similar relief.  Bilateral L4 TFESI did not provide much relief.  She is currently taking ibuprofen as needed with some relief.  She does continue to do a home exercise program with only transient relief.  The patient rates her pain a 7 out of 10 and the pain is intermittent.  The pain is described as pressure-like, numbness, and pins-and-needles.  The pain is increased with standing, walking, and exercise.  The pain is alleviated with injections, sitting, lying down, and relaxation.    Other than as stated above, the patient denies any interval changes in medications, medical condition, mental condition, symptoms, or allergies since the last office visit.    I have personally reviewed and/or updated the patient's past medical history, past surgical history, family history, social history, current medications, allergies, and vital signs today.     Review of Systems   Constitutional:  Negative for fever and unexpected weight change.   HENT:  Negative for trouble swallowing.    Eyes:  Negative for visual disturbance.   Respiratory:  Negative for shortness of breath and wheezing.    Cardiovascular:  Negative for chest pain and palpitations.   Gastrointestinal:  Negative for constipation, diarrhea, nausea and vomiting.    Endocrine: Negative for cold intolerance, heat intolerance and polydipsia.   Genitourinary:  Negative for difficulty urinating and frequency.   Musculoskeletal:  Positive for back pain and gait problem. Negative for arthralgias, joint swelling and myalgias.   Skin:  Negative for rash.   Neurological:  Negative for dizziness, seizures, syncope, weakness and headaches.   Hematological:  Does not bruise/bleed easily.   Psychiatric/Behavioral:  Negative for dysphoric mood.    All other systems reviewed and are negative.      Patient Active Problem List   Diagnosis    Primary hypertension    Osteopenia of left hip    Essential tremor    Mixed hyperlipidemia    Prediabetes    Class 1 obesity due to excess calories with serious comorbidity and body mass index (BMI) of 30.0 to 30.9 in adult    Vitamin D deficiency    Seasonal allergic rhinitis due to pollen    Lumbar radiculopathy    Spinal stenosis at L4-L5 level    Primary osteoarthritis of right knee       Past Medical History:   Diagnosis Date    Allergic     seasonal allergies    Arthritis     back    Hypertension 2016    treated w/ meds    Lymphadenopathy     LAST ASSESSED [id number]    Osteoarthritis 2016    Palpitations 2021    Tear of biceps tendon 10/19/2023    Left long head bicep. Near complete tear. MRI       Tear of left rotator cuff 10/19/2023    Visual impairment 1975    where glasses       Past Surgical History:   Procedure Laterality Date    REDUCTION MAMMAPLASTY      TONSILLECTOMY      TUBAL LIGATION         Family History   Problem Relation Age of Onset    Coronary artery disease Mother     Diabetes Mother     Hypertension Mother     Osteoporosis Mother             Dementia Mother             Arthritis Mother             Alzheimer's disease Mother             Diabetes Father             Stroke Father             Diabetes Sister     Hypertension Sister     Hypertension Brother        Social  History     Occupational History    Not on file   Tobacco Use    Smoking status: Never    Smokeless tobacco: Never   Vaping Use    Vaping status: Never Used   Substance and Sexual Activity    Alcohol use: Yes     Alcohol/week: 2.0 standard drinks of alcohol     Types: 1 Glasses of wine, 1 Cans of beer per week     Comment: social    Drug use: No    Sexual activity: Yes     Partners: Male     Birth control/protection: Post-menopausal       Current Outpatient Medications on File Prior to Visit   Medication Sig    amLODIPine (NORVASC) 5 mg tablet Take 1 tablet (5 mg total) by mouth daily    atorvastatin (LIPITOR) 20 mg tablet TAKE 1 TABLET BY MOUTH EVERY DAY    Calcium Carbonate-Vitamin D 600-400 MG-UNIT per tablet Take 1 tablet by mouth daily    cetirizine (ZyrTEC) 5 MG chewable tablet Chew 5 mg as needed    Diclofenac Sodium (VOLTAREN) 1 % Apply 2 g topically 4 (four) times a day (Patient taking differently: Apply 2 g topically if needed)    fluticasone (FLONASE) 50 mcg/act nasal spray 2 sprays into each nostril daily (Patient taking differently: 2 sprays into each nostril as needed)    lisinopril (ZESTRIL) 30 mg tablet TAKE 1 TABLET BY MOUTH EVERY DAY    methocarbamol (ROBAXIN) 500 mg tablet Take 1 tablet (500 mg total) by mouth daily at bedtime as needed for muscle spasms    Multiple Vitamins-Minerals (MULTIVITAMIN ADULT PO) Take 1 tablet by mouth daily    nadolol (CORGARD) 20 mg tablet TAKE 1.5 TABLETS (30 MG TOTAL) BY MOUTH DAILY AT BEDTIME    Restasis 0.05 % ophthalmic emulsion ADMINISTER 1 DROP TO BOTH EYES AS NEEDED (DRY EYES) (Patient taking differently: as needed)    sodium chloride (OCEAN) 0.65 % nasal spray 1 spray into each nostril as needed for rhinitis for up to 30 days    valACYclovir (VALTREX) 1,000 mg tablet Take 1 tablet (1,000 mg total) by mouth 2 (two) times a day for 14 days (Patient taking differently: Take 1,000 mg by mouth as needed)     No current facility-administered medications on file  "prior to visit.       No Known Allergies    Physical Exam    Ht 5' 7\" (1.702 m)   Wt 91.2 kg (201 lb)   BMI 31.48 kg/m²     Constitutional: normal, well developed, well nourished, alert, in no distress and non-toxic and no overt pain behavior.  Eyes: anicteric  HEENT: grossly intact  Neck: supple, symmetric, trachea midline and no masses   Pulmonary:even and unlabored  Cardiovascular:No edema or pitting edema present  Skin:Normal without rashes or lesions and well hydrated  Psychiatric:Mood and affect appropriate  Neurologic:Cranial Nerves II-XII grossly intact  Musculoskeletal:normal gait.  Bilateral lumbar paraspinals minimally tender to palpation, but ropey in texture.  Bilateral lower extremity strength 5 out of 5 in all muscle groups.  Sensation intact to light touch in L3-S1 dermatomes bilaterally.  Negative seated straight leg raise bilaterally.    Imaging  MRI LUMBAR SPINE WITHOUT CONTRAST     INDICATION: M54.16: Radiculopathy, lumbar region.     COMPARISON: Lumbar radiographs 6/29/2016     TECHNIQUE:  Multiplanar, multisequence imaging of the lumbar spine was performed. .        IMAGE QUALITY:  Diagnostic     FINDINGS:     VERTEBRAL BODIES:  There are 5 lumbar type vertebral bodies. Stable grade 1 degenerative retrolisthesis is noted at the L1-2 level. Stable grade 1 degenerative anterolisthesis is noted at the L4-5 and L5-S1 levels. No compression fracture.    Marrow   signal heterogeneity which can be seen with underlying red marrow proliferation. Hemangiomas are noted within the T10 and L2-L5 vertebral bodies.     SACRUM: Mild marrow edema within the left sacral ala (series 7: 37) which can be seen as early sequela of an insufficiency fracture.     DISTAL CORD AND CONUS:  Normal size and signal within the distal cord and conus. The conus terminates at the L1-2 level.  The cauda equina nerve roots appear within normal limits.     PARASPINAL SOFT TISSUES:  Paraspinal soft tissues are unremarkable.   "   LOWER THORACIC DISC SPACES:  Normal disc height and signal.  No disc herniation, canal stenosis or foraminal narrowing.     LUMBAR DISC SPACES:     L1-2: Grade 1 degenerative retrolisthesis with mild disc bulge.  No central canal or  subarticular/lateral recess narrowing.  No neural foraminal stenosis.     L2-3: Mild to moderate diffuse disc bulge extending into the foraminal regions. Bilateral ligamentum flavum and facet hypertrophy. Mild central canal and bilateral subarticular/lateral recess narrowing.  No neural foraminal stenosis.     L3-4: Mild to moderate diffuse disc bulge extending into the foraminal regions. Mild central canal and bilateral subarticular/lateral recess narrowing. Mild bilateral neural foraminal stenosis.     L4-5: Grade 1 degenerative anterolisthesis with secondary unroofing of the posterior disc and mild to moderate diffuse disc bulge. Bilateral ligamentum flavum and facet hypertrophy. Moderate to severe central canal and bilateral subarticular/lateral   recess narrowing.  No neural foraminal stenosis.     L5-S1: Grade 1 degenerative anterolisthesis is noted with secondary diffuse disc bulge. Bilateral ligamentum flavum and facet hypertrophy. Superimposed right foraminal disc protrusion is noted abutting the exiting right L5 nerve root. No central canal or    subarticular/lateral recess narrowing. Mild right neural foraminal stenosis.        IMPRESSION:     Stable grade 1 degenerative retrolisthesis is noted at the L1-2 level. Stable grade 1 degenerative anterolisthesis is noted at the L4-5 and L5-S1 levels.     Multilevel lumbar degenerative disc disease as detailed with moderate to severe central canal stenosis at the L4-5 level.     Mild marrow edema within the left sacral ala, suspected early sequela of an insufficiency fracture.     The study was marked in EPIC for immediate notification.

## 2025-05-08 ENCOUNTER — OFFICE VISIT (OUTPATIENT)
Dept: PAIN MEDICINE | Facility: CLINIC | Age: 70
End: 2025-05-08
Payer: COMMERCIAL

## 2025-05-08 VITALS — WEIGHT: 201 LBS | BODY MASS INDEX: 31.55 KG/M2 | HEIGHT: 67 IN

## 2025-05-08 DIAGNOSIS — M47.816 LUMBAR SPONDYLOSIS: ICD-10-CM

## 2025-05-08 DIAGNOSIS — M48.061 SPINAL STENOSIS OF LUMBAR REGION, UNSPECIFIED WHETHER NEUROGENIC CLAUDICATION PRESENT: ICD-10-CM

## 2025-05-08 DIAGNOSIS — M48.061 SPINAL STENOSIS AT L4-L5 LEVEL: ICD-10-CM

## 2025-05-08 DIAGNOSIS — M54.16 LUMBAR RADICULOPATHY: Primary | ICD-10-CM

## 2025-05-08 PROCEDURE — 99214 OFFICE O/P EST MOD 30 MIN: CPT | Performed by: ANESTHESIOLOGY

## 2025-05-08 PROCEDURE — G2211 COMPLEX E/M VISIT ADD ON: HCPCS | Performed by: ANESTHESIOLOGY

## 2025-05-20 ENCOUNTER — HOSPITAL ENCOUNTER (OUTPATIENT)
Dept: RADIOLOGY | Facility: CLINIC | Age: 70
Discharge: HOME | End: 2025-05-20
Attending: ANESTHESIOLOGY
Payer: COMMERCIAL

## 2025-05-20 VITALS
SYSTOLIC BLOOD PRESSURE: 125 MMHG | TEMPERATURE: 97.7 F | RESPIRATION RATE: 16 BRPM | OXYGEN SATURATION: 99 % | DIASTOLIC BLOOD PRESSURE: 81 MMHG | HEART RATE: 70 BPM

## 2025-05-20 DIAGNOSIS — M54.16 LUMBAR RADICULOPATHY: ICD-10-CM

## 2025-05-20 PROCEDURE — 62323 NJX INTERLAMINAR LMBR/SAC: CPT | Performed by: ANESTHESIOLOGY

## 2025-05-20 RX ORDER — METHYLPREDNISOLONE ACETATE 80 MG/ML
80 INJECTION, SUSPENSION INTRA-ARTICULAR; INTRALESIONAL; INTRAMUSCULAR; PARENTERAL; SOFT TISSUE ONCE
Status: COMPLETED | OUTPATIENT
Start: 2025-05-20 | End: 2025-05-20

## 2025-05-20 RX ADMIN — IOHEXOL 1 ML: 300 INJECTION, SOLUTION INTRAVENOUS at 09:12

## 2025-05-20 RX ADMIN — METHYLPREDNISOLONE ACETATE 80 MG: 80 INJECTION, SUSPENSION INTRA-ARTICULAR; INTRALESIONAL; INTRAMUSCULAR; SOFT TISSUE at 09:13

## 2025-05-20 NOTE — H&P
History of Present Illness: The patient is a 69 y.o. female who presents with complaints of low back and leg pain.    Past Medical History:   Diagnosis Date    Allergic     seasonal allergies    Arthritis     back    Hypertension 2016    treated w/ meds    Lymphadenopathy     LAST ASSESSED [id number]    Osteoarthritis 2016    Palpitations 12/08/2021    Tear of biceps tendon 10/19/2023    Left long head bicep. Near complete tear. MRI 2023      Tear of left rotator cuff 10/19/2023    Visual impairment 1975    where glasses       Past Surgical History:   Procedure Laterality Date    REDUCTION MAMMAPLASTY      TONSILLECTOMY      TUBAL LIGATION  1993       Current Medications[1]    Allergies[2]    Physical Exam:   Vitals:    05/20/25 0858   BP: 129/82   Pulse: 68   Resp: 16   Temp: 97.7 °F (36.5 °C)   SpO2: 98%     General: Awake, Alert, Oriented x 3, Mood and affect appropriate  Respiratory: Respirations even and unlabored  Cardiovascular: Peripheral pulses intact; no edema  Musculoskeletal Exam: Normal gait    ASA Score: 2         Assessment: Lumbar radiculitis    Plan: L5-S1 LESI         [1]   Current Outpatient Medications:     amLODIPine (NORVASC) 5 mg tablet, Take 1 tablet (5 mg total) by mouth daily, Disp: 90 tablet, Rfl: 1    atorvastatin (LIPITOR) 20 mg tablet, TAKE 1 TABLET BY MOUTH EVERY DAY, Disp: 90 tablet, Rfl: 1    Calcium Carbonate-Vitamin D 600-400 MG-UNIT per tablet, Take 1 tablet by mouth daily, Disp: , Rfl:     cetirizine (ZyrTEC) 5 MG chewable tablet, Chew 5 mg as needed, Disp: , Rfl:     Diclofenac Sodium (VOLTAREN) 1 %, Apply 2 g topically 4 (four) times a day (Patient taking differently: Apply 2 g topically if needed), Disp: 100 g, Rfl: 0    fluticasone (FLONASE) 50 mcg/act nasal spray, 2 sprays into each nostril daily (Patient taking differently: 2 sprays into each nostril as needed), Disp: 9.9 g, Rfl: 0    lisinopril (ZESTRIL) 30 mg tablet, TAKE 1 TABLET BY MOUTH EVERY DAY, Disp: 30 tablet, Rfl:  0    methocarbamol (ROBAXIN) 500 mg tablet, Take 1 tablet (500 mg total) by mouth daily at bedtime as needed for muscle spasms, Disp: 60 tablet, Rfl: 0    Multiple Vitamins-Minerals (MULTIVITAMIN ADULT PO), Take 1 tablet by mouth daily, Disp: , Rfl:     nadolol (CORGARD) 20 mg tablet, TAKE 1.5 TABLETS (30 MG TOTAL) BY MOUTH DAILY AT BEDTIME, Disp: 135 tablet, Rfl: 1    Restasis 0.05 % ophthalmic emulsion, ADMINISTER 1 DROP TO BOTH EYES AS NEEDED (DRY EYES) (Patient taking differently: as needed), Disp: 180 mL, Rfl: 1    sodium chloride (OCEAN) 0.65 % nasal spray, 1 spray into each nostril as needed for rhinitis for up to 30 days, Disp: 10 mL, Rfl: 0    valACYclovir (VALTREX) 1,000 mg tablet, Take 1 tablet (1,000 mg total) by mouth 2 (two) times a day for 14 days (Patient taking differently: Take 1,000 mg by mouth as needed), Disp: 90 tablet, Rfl: 3  [2] No Known Allergies

## 2025-05-20 NOTE — DISCHARGE INSTR - LAB
Epidural Steroid Injection   WHAT YOU NEED TO KNOW:   An epidural steroid injection (SCOTT) is a procedure to inject steroid medicine into the epidural space. The epidural space is between your spinal cord and vertebrae. Steroids reduce inflammation and fluid buildup in your spine that may be causing pain. You may be given pain medicine along with the steroids.          ACTIVITY  Do not drive or operate machinery today.  No strenuous activity today - bending, lifting, etc.  You may resume normal activites starting tomorrow - start slowly and as tolerated.  You may shower today, but no tub baths or hot tubs.  You may have numbness for several hours from the local anesthetic. Please use caution and common sense, especially with weight-bearing activities.    CARE OF THE INJECTION SITE  If you have soreness or pain, apply ice to the area today (20 minutes on/20 minutes off).  Starting tomorrow, you may use warm, moist heat or ice if needed.  You may have an increase or change in your discomfort for 36-48 hours after your treatment.  Apply ice and continue with any pain medication you have been prescribed.  Notify the [facility] if you have any of the following: redness, drainage, swelling, headache, stiff neck or fever above 100°F.    SPECIAL INSTRUCTIONS  Our office will contact you in approximately 14 days for a progress report.    MEDICATIONS  Continue to take all routine medications.  Our office may have instructed you to hold some medications.    As no general anesthesia was used in today's procedure, you should not experience any side effects related to anesthesia.     If you are diabetic, the steroids used in today's injection may temporarily increase your blood sugar levels after the first few days after your injection. Please keep a close eye on your sugars and alert the doctor who manages your diabetes if your sugars are significantly high from your baseline or you are symptomatic.     If you have a  problem specifically related to your procedure, please call our office at [phone].  Problems not related to your procedure should be directed to your primary care physician.

## 2025-05-31 DIAGNOSIS — G25.0 ESSENTIAL TREMOR: ICD-10-CM

## 2025-06-02 ENCOUNTER — APPOINTMENT (OUTPATIENT)
Dept: LAB | Facility: MEDICAL CENTER | Age: 70
End: 2025-06-02
Attending: FAMILY MEDICINE
Payer: COMMERCIAL

## 2025-06-02 LAB
ALBUMIN SERPL BCG-MCNC: 4.5 G/DL (ref 3.5–5)
ALP SERPL-CCNC: 76 U/L (ref 34–104)
ALT SERPL W P-5'-P-CCNC: 24 U/L (ref 7–52)
ANION GAP SERPL CALCULATED.3IONS-SCNC: 7 MMOL/L (ref 4–13)
AST SERPL W P-5'-P-CCNC: 18 U/L (ref 13–39)
BASOPHILS # BLD AUTO: 0.05 THOUSANDS/ÂΜL (ref 0–0.1)
BASOPHILS NFR BLD AUTO: 1 % (ref 0–1)
BILIRUB SERPL-MCNC: 0.7 MG/DL (ref 0.2–1)
BUN SERPL-MCNC: 20 MG/DL (ref 5–25)
CALCIUM SERPL-MCNC: 10.3 MG/DL (ref 8.4–10.2)
CHLORIDE SERPL-SCNC: 101 MMOL/L (ref 96–108)
CHOLEST SERPL-MCNC: 177 MG/DL (ref ?–200)
CO2 SERPL-SCNC: 30 MMOL/L (ref 21–32)
CREAT SERPL-MCNC: 0.69 MG/DL (ref 0.6–1.3)
EOSINOPHIL # BLD AUTO: 0.21 THOUSAND/ÂΜL (ref 0–0.61)
EOSINOPHIL NFR BLD AUTO: 3 % (ref 0–6)
ERYTHROCYTE [DISTWIDTH] IN BLOOD BY AUTOMATED COUNT: 12.6 % (ref 11.6–15.1)
EST. AVERAGE GLUCOSE BLD GHB EST-MCNC: 128 MG/DL
GFR SERPL CREATININE-BSD FRML MDRD: 90 ML/MIN/1.73SQ M
GLUCOSE P FAST SERPL-MCNC: 108 MG/DL (ref 65–99)
HBA1C MFR BLD: 6.1 %
HCT VFR BLD AUTO: 44.3 % (ref 34.8–46.1)
HDLC SERPL-MCNC: 50 MG/DL
HGB BLD-MCNC: 15 G/DL (ref 11.5–15.4)
IMM GRANULOCYTES # BLD AUTO: 0.03 THOUSAND/UL (ref 0–0.2)
IMM GRANULOCYTES NFR BLD AUTO: 0 % (ref 0–2)
LDLC SERPL CALC-MCNC: 101 MG/DL (ref 0–100)
LYMPHOCYTES # BLD AUTO: 2.32 THOUSANDS/ÂΜL (ref 0.6–4.47)
LYMPHOCYTES NFR BLD AUTO: 29 % (ref 14–44)
MCH RBC QN AUTO: 30.2 PG (ref 26.8–34.3)
MCHC RBC AUTO-ENTMCNC: 33.9 G/DL (ref 31.4–37.4)
MCV RBC AUTO: 89 FL (ref 82–98)
MONOCYTES # BLD AUTO: 0.57 THOUSAND/ÂΜL (ref 0.17–1.22)
MONOCYTES NFR BLD AUTO: 7 % (ref 4–12)
NEUTROPHILS # BLD AUTO: 4.7 THOUSANDS/ÂΜL (ref 1.85–7.62)
NEUTS SEG NFR BLD AUTO: 60 % (ref 43–75)
NONHDLC SERPL-MCNC: 127 MG/DL
NRBC BLD AUTO-RTO: 0 /100 WBCS
PLATELET # BLD AUTO: 304 THOUSANDS/UL (ref 149–390)
PMV BLD AUTO: 9.7 FL (ref 8.9–12.7)
POTASSIUM SERPL-SCNC: 5.1 MMOL/L (ref 3.5–5.3)
PROT SERPL-MCNC: 7.2 G/DL (ref 6.4–8.4)
RBC # BLD AUTO: 4.97 MILLION/UL (ref 3.81–5.12)
SODIUM SERPL-SCNC: 138 MMOL/L (ref 135–147)
TRIGL SERPL-MCNC: 130 MG/DL (ref ?–150)
WBC # BLD AUTO: 7.88 THOUSAND/UL (ref 4.31–10.16)

## 2025-06-02 RX ORDER — LISINOPRIL 30 MG/1
30 TABLET ORAL DAILY
Qty: 90 TABLET | Refills: 1 | Status: SHIPPED | OUTPATIENT
Start: 2025-06-02

## 2025-06-03 ENCOUNTER — TELEPHONE (OUTPATIENT)
Dept: OBGYN CLINIC | Facility: MEDICAL CENTER | Age: 70
End: 2025-06-03

## 2025-06-03 NOTE — TELEPHONE ENCOUNTER
Caller: sana Hernandez   Doctor/office: Dr Avina  CB#: [phone]    % of improvement:75    Pain Scale (1-10): 4 when it hurts,    Pt stated that her mid to upper back, just below the shoulder blades there is a little pain.

## 2025-06-06 DIAGNOSIS — M54.16 LUMBAR RADICULOPATHY: ICD-10-CM

## 2025-06-09 RX ORDER — METHOCARBAMOL 500 MG/1
500 TABLET, FILM COATED ORAL
Qty: 60 TABLET | Refills: 0 | Status: SHIPPED | OUTPATIENT
Start: 2025-06-09

## 2025-06-17 ENCOUNTER — OFFICE VISIT (OUTPATIENT)
Dept: FAMILY MEDICINE CLINIC | Facility: CLINIC | Age: 70
End: 2025-06-17
Payer: COMMERCIAL

## 2025-06-17 VITALS
HEART RATE: 74 BPM | HEIGHT: 67 IN | RESPIRATION RATE: 18 BRPM | BODY MASS INDEX: 31 KG/M2 | SYSTOLIC BLOOD PRESSURE: 118 MMHG | OXYGEN SATURATION: 98 % | WEIGHT: 197.5 LBS | TEMPERATURE: 98.2 F | DIASTOLIC BLOOD PRESSURE: 72 MMHG

## 2025-06-17 DIAGNOSIS — N95.1 VAGINAL DRYNESS, MENOPAUSAL: ICD-10-CM

## 2025-06-17 DIAGNOSIS — E78.2 MIXED HYPERLIPIDEMIA: ICD-10-CM

## 2025-06-17 DIAGNOSIS — L98.9 SKIN LESION: ICD-10-CM

## 2025-06-17 DIAGNOSIS — R73.03 PREDIABETES: ICD-10-CM

## 2025-06-17 DIAGNOSIS — R21 RASH AND NONSPECIFIC SKIN ERUPTION: Primary | ICD-10-CM

## 2025-06-17 PROCEDURE — 99214 OFFICE O/P EST MOD 30 MIN: CPT

## 2025-06-17 PROCEDURE — G2211 COMPLEX E/M VISIT ADD ON: HCPCS

## 2025-06-17 RX ORDER — ESTRADIOL 0.1 MG/G
1 CREAM VAGINAL DAILY
Qty: 42.5 G | Refills: 0 | Status: SHIPPED | OUTPATIENT
Start: 2025-06-17

## 2025-06-17 NOTE — PROGRESS NOTES
Name: Mary Lopez      : 1955      MRN: [id number]  Encounter Provider: Rose Murphy DO  Encounter Date: 2025   Encounter department: [facility]  :  Assessment & Plan  Rash and nonspecific skin eruption  Mostly resolved at this point, some lingering areas on right underarm.  Still nonpruritic and is not spreading.  Patient wonders if this is secondary to rubbing against the car seat or the jacket that she wear in the winter.  Will continue to monitor for now       Prediabetes  Stable per recent labs         Mixed hyperlipidemia  Stable per recent labs  Continue with lifestyle modifications with diet and exercise  Continue continue on Lipitor 20 mg daily       Vaginal dryness, menopausal  Reports vaginal dryness -occasionally using lubricants.  Does also report intermittent episodes of urinary leakage - not enough to the point that is affecting her daily life -currently just uses pantiliners.  Would like to trial vaginal estrogen cream as below  Orders:    estradiol (ESTRACE VAGINAL) 0.1 mg/g vaginal cream; Insert 1 g into the vagina daily Apply 1 gram nightly for 2 weeks, then 1 gram twice a week.    Skin lesion  Papular, erythematous 0.5 cm x 0.5 cm lesion on R calf for at least 6 months that she is aware of  Follow with derm; closely monitor for growth or changes.              History of Present Illness   HPI    Chief Complaint   Patient presents with    Follow-up     Follow up about rash on back of right arm.  spot on right lower leg, unsure for how long. Would like it checked out. Needs to review blood work results with doc.       Patient here for 6-month follow-up.  Seen about 2 months ago for nonspecific rash on the underside of both her arms.  Reports that she has mostly been using a moisturizer and the rash on her left arm has since resolved and with minimal changes still present on her right arm.  Still nonpruritic and not spreading.  She also reports a spot on her  "right calf that she initially noticed in the winter but not sure if it is growing or not.  Image in media.  Does follow-up with dermatology, patient will schedule appointment.    Review of Systems   Constitutional:  Negative for chills and fever.   HENT:  Negative for congestion and rhinorrhea.    Eyes:  Negative for visual disturbance.   Respiratory:  Negative for cough and shortness of breath.    Cardiovascular:  Negative for chest pain and palpitations.   Gastrointestinal:  Negative for abdominal pain, constipation, diarrhea, nausea and vomiting.   Genitourinary:  Negative for dysuria.   Musculoskeletal:  Negative for arthralgias.   Skin:  Negative for rash.   Neurological:  Negative for dizziness, light-headedness and headaches.       Objective   /72 (BP Location: Left arm, Patient Position: Sitting, Cuff Size: Standard)   Pulse 74   Temp 98.2 °F (36.8 °C) (Temporal)   Resp 18   Ht 5' 7\" (1.702 m)   Wt 89.6 kg (197 lb 8 oz)   SpO2 98%   BMI 30.93 kg/m²      Physical Exam  Vitals reviewed.   Constitutional:       Appearance: Normal appearance.   HENT:      Head: Normocephalic and atraumatic.      Right Ear: External ear normal.      Left Ear: External ear normal.      Nose: Nose normal.      Mouth/Throat:      Pharynx: Oropharynx is clear.     Eyes:      Extraocular Movements: Extraocular movements intact.      Conjunctiva/sclera: Conjunctivae normal.       Cardiovascular:      Rate and Rhythm: Normal rate and regular rhythm.      Pulses: Normal pulses.      Heart sounds: Normal heart sounds.   Pulmonary:      Effort: Pulmonary effort is normal.      Breath sounds: Normal breath sounds.   Abdominal:      Palpations: Abdomen is soft.     Musculoskeletal:      Cervical back: Neck supple.      Right lower leg: No edema.      Left lower leg: No edema.     Skin:     General: Skin is warm.      Comments: Papular, erythematous 0.5 cm x 0.5 cm lesion on R calf     Neurological:      Mental Status: She is " alert and oriented to person, place, and time.     Psychiatric:         Mood and Affect: Mood normal.         Behavior: Behavior normal.         Thought Content: Thought content normal.         Judgment: Judgment normal.

## 2025-06-17 NOTE — ASSESSMENT & PLAN NOTE
Stable per recent labs  Continue with lifestyle modifications with diet and exercise  Continue continue on Lipitor 20 mg daily

## 2025-07-07 ENCOUNTER — OFFICE VISIT (OUTPATIENT)
Age: 70
End: 2025-07-07
Payer: COMMERCIAL

## 2025-07-07 VITALS — WEIGHT: 195 LBS | HEIGHT: 67 IN | BODY MASS INDEX: 30.61 KG/M2

## 2025-07-07 DIAGNOSIS — L81.0 POST-INFLAMMATORY HYPERPIGMENTATION: ICD-10-CM

## 2025-07-07 DIAGNOSIS — D22.9 MULTIPLE MELANOCYTIC NEVI: ICD-10-CM

## 2025-07-07 DIAGNOSIS — L81.4 LENTIGO: ICD-10-CM

## 2025-07-07 DIAGNOSIS — D18.01 CHERRY ANGIOMA: Primary | ICD-10-CM

## 2025-07-07 DIAGNOSIS — L82.0 SEBORRHEIC KERATOSIS, INFLAMED: ICD-10-CM

## 2025-07-07 DIAGNOSIS — D23.9 DERMATOFIBROMA: ICD-10-CM

## 2025-07-07 PROCEDURE — 17110 DESTRUCTION B9 LES UP TO 14: CPT | Performed by: DERMATOLOGY

## 2025-07-07 PROCEDURE — 99204 OFFICE O/P NEW MOD 45 MIN: CPT | Performed by: DERMATOLOGY

## 2025-07-07 NOTE — PATIENT INSTRUCTIONS
"  POST-INFLAMMATORY HYPERPIGMENTATION (\"PIH\")    Physical Exam:  Anatomic Location Affected:   right axilla       Assessment and Plan:  Based on a thorough discussion of this condition and the management approach to it (including a comprehensive discussion of the known risks, side effects and potential benefits of treatment), the patient (family) agrees to implement the following specific plan:  Reassured benign   Advised that darker pigment will fade over time; may take months or years   Recommended retinol, or Vitamin C creams or serum/ Neutrogena rapid repair cream     Assessment and Plan:Assessment and Plan  Post-inflammatory hyperpigmentation (PIH) is a temporary darkening of the skin that follows injury such as a cut or burn, or inflammation of the skin following an infection or rash. It can occur in anyone, but most commonly affects darker skin types with greater frequency and severity.     Many types of inflammatory skin diseases and injuries can cause PIH, but the most common ones are acne vulgaris, atopic dermatitis, and impetigo. It is due to an overproduction of melanin and uneven transfer of pigment to surrounding keratinocytes. The exact mechanism is unknown, but it is shown to be stimulated by prostanoids, cytokines, chemokines, and other inflammatory mediators. Some medications may also darken postinflammatory pigmentation such as antimalarial drugs, clofazimine, tetracycline, anticancer drugs such as bleomycin, doxorubicin, 5-fluorouracil and busulfan.     Postinflammatory hyperpigmented patches are located at the site of original inflammation after the original condition has healed or resolved. They range from light brown to black in color and may become darker if exposed to sunlight and other sources of UV rays. Hyperpigmentation in the dermis has blue-grey appearance and may be permanent or resolve over a protracted period of time if left untreated.     The management of PIH should begin by first " addressing the underlying inflammatory skin condition. It is important to be mindful that the treatment itself may exacerbate PIH by causing irritation. Treatments include the following:   At least three times a day application of SPF 50+ broad-spectrum sunscreen   Topical depigmenting agents such as hydroxyquinone, azelic acid, vitamin C cream, corticosteroid cream, kojic acid, licorice extract, and retinoids   Chemical peels  Laser treatments and intense pulsed light therapies for epidermal pigmentation can be used with higher risk of aggravating hyperpigmentation

## 2025-07-07 NOTE — PROGRESS NOTES
"[facility] Note     Patient Name: Mray Lopez  Encounter Date: 7/7/2025       Have you been cared for by a [facility]ologist in the last 3 years and, if so, which description applies to you? NO. I am considered a \"new\" patient and must complete all patient intake questions. I am not of child-bearing potential.     REVIEW OF SYSTEMS:  Have you recently had or currently have any of the following? Recent fever or chills? No  Any non-healing wound? No   PAST MEDICAL HISTORY:  Have you personally ever had or currently have any of the following?  If \"YES,\" then please provide more detail. Skin cancer (such as Melanoma, Basal Cell Carcinoma, Squamous Cell Carcinoma?  No  Tuberculosis, HIV/AIDS, Hepatitis B or C: No  Radiation Treatment No   HISTORY OF IMMUNOSUPPRESSION:   Do you have a history of any of the following:  Systemic Immunosuppression such as Diabetes, Biologic or Immunotherapy, Chemotherapy, Organ Transplantation, Bone Marrow Transplantation or Prednisone?  No     Answering \"YES\" requires the addition of the dotphrase \"IMMUNOSUPPRESSED\" as the first diagnosis of the patient's visit.   FAMILY HISTORY:  Any \"first degree relatives\" (parent, brother, sister, or child) with the following?    Skin Cancer, Pancreatic or Other Cancer? YES, sister had Lymphoma    PATIENT EXPERIENCE:    Do you want the Dermatologist to perform a COMPLETE skin exam today including a clinical examination under the \"bra and underwear\" areas?  Yes  If necessary, do we have your permission to call and leave a detailed message on your Preferred Phone number that includes your specific medical information?  Yes      Allergies[1] Current Medications[2]              Whom besides the patient is providing clinical information about today's encounter?   NO ADDITIONAL HISTORIAN (patient alone provided history)    Physical Exam and Assessment/Plan by Diagnosis:    Patient is present for a spot on the lower leg " "      SEBORRHEIC KERATOSIS- Inflamed     Physical Exam:  Anatomic Location: right lateral shin   Morphologic Description:  Waxy \"stuck-on\" discrete papule  Any active signs of \"inflamed\" status:  Active skin ulceration / inflammation  Pertinent Positives:  Pertinent Negatives:    Additional History of Present Condition:   Traumatized/chronically irritated/\"rubbed\" by socks and libby pants    Plan:  Reviewed that these lesions are NOT cancers and cannot harm a person directly.  Under Medicare guidelines, the removal of a seborrheic keratosis is NOT covered unless the specific lesion is of medical necessity (interferes with vision, hearing, breathing), or is symptomatic (bleeding, itching, infected, inflamed). Medicare does NOT cover removal simply if the lesions are unsightly. Medicare does cover the evaluation of any lesion to determine if a lesion is or is not cancerous (i.e. skin biopsy).    PROCEDURE:  DESTRUCTION OF BENIGN LESIONS WITH CRYOTHERAPY  After a thorough discussion of treatment options and risk/benefits/alternatives (including but not limited to local pain, scarring, dyspigmentation, blistering, and possible superinfection), verbal and written consent were obtained and the aforementioned lesions were treated on with cryotherapy using liquid nitrogen x 3 cycle for 5-10 seconds.    TOTAL NUMBER of 1 lesions were treated today on the ANATOMIC LOCATION: right lateral shin .    The patient tolerated the procedure well, and after-care instructions were provided.         Medical Complexity:    SELF-LIMITED OR MINOR PROBLEM.  Problem runs a definite and prescribed course, is transient in nature, and is not likely to permanently alter health status.        POST-INFLAMMATORY HYPERPIGMENTATION (\"PIH\")    Physical Exam:  Anatomic Location Affected:   right axilla   Morphological Description:  brown patch  Pertinent Positives:  Pertinent Negatives:    Additional History of Present Condition:  present on exam "     Assessment and Plan:  Based on a thorough discussion of this condition and the management approach to it (including a comprehensive discussion of the known risks, side effects and potential benefits of treatment), the patient (family) agrees to implement the following specific plan:  Reassured benign   Advised that darker pigment will fade over time; may take months or years   Recommended retinol, or Vitamin C creams or serum/ Neutrogena rapid repair cream     Assessment and Plan:Assessment and Plan  Post-inflammatory hyperpigmentation (PIH) is a temporary darkening of the skin that follows injury such as a cut or burn, or inflammation of the skin following an infection or rash. It can occur in anyone, but most commonly affects darker skin types with greater frequency and severity.     Many types of inflammatory skin diseases and injuries can cause PIH, but the most common ones are acne vulgaris, atopic dermatitis, and impetigo. It is due to an overproduction of melanin and uneven transfer of pigment to surrounding keratinocytes. The exact mechanism is unknown, but it is shown to be stimulated by prostanoids, cytokines, chemokines, and other inflammatory mediators. Some medications may also darken postinflammatory pigmentation such as antimalarial drugs, clofazimine, tetracycline, anticancer drugs such as bleomycin, doxorubicin, 5-fluorouracil and busulfan.     Postinflammatory hyperpigmented patches are located at the site of original inflammation after the original condition has healed or resolved. They range from light brown to black in color and may become darker if exposed to sunlight and other sources of UV rays. Hyperpigmentation in the dermis has blue-grey appearance and may be permanent or resolve over a protracted period of time if left untreated.     The management of PIH should begin by first addressing the underlying inflammatory skin condition. It is important to be mindful that the treatment  "itself may exacerbate PIH by causing irritation. Treatments include the following:   At least three times a day application of SPF 50+ broad-spectrum sunscreen   Topical depigmenting agents such as hydroxyquinone, azelic acid, vitamin C cream, corticosteroid cream, kojic acid, licorice extract, and retinoids   Chemical peels  Laser treatments and intense pulsed light therapies for epidermal pigmentation can be used with higher risk of aggravating hyperpigmentation     DERMATOFIBROMA    Physical Exam:  Anatomic Location Affected:  left flank, left shin   Morphological Description:  6 mm dermal brown papule   Pertinent Positives:  Pertinent Negatives:    Additional History of Present Condition:  present on exam     Assessment and Plan:  Based on a thorough discussion of this condition and the management approach to it (including a comprehensive discussion of the known risks, side effects and potential benefits of treatment), the patient (family) agrees to implement the following specific plan:  Reassured benign     Assessment and Plan:  A dermatofibroma is a common benign fibrous nodule that most often arises on the skin of the lower legs.  A dermatofibroma is also called a \"cutaneous fibrous histiocytoma.\"  Dermatofibromas occur at all ages and in people of every ethnicity. They are more common in women than in men.    It is not clear if dermatofibroma is a reactive process or if it is a neoplasm. The lesions are made up of proliferating fibroblasts. Histiocytes may also be involved.  They are sometimes attributed to an insect bite or ingrownhair or local trauma, but not consistently. They may be more numerous in patients with altered immunity.    Dermatofibromas most often occur on the legs and arms, but may also arise on the trunk or any site of the body.  Typical clinical features include the following:  People may have 1 or up to 15 lesions.  Size varies from 0.5-1.5 cm diameter; most lesions are 7-10 mm " "diameter.  They are firm nodules tethered to the skin surface and mobile over subcutaneous tissue.  The skin \"dimples\" on pinching the lesion.  Color may be pink to light brown in white skin, and dark brown to black in dark skin; some appear paler in the center.  They do not usually cause symptoms, but they are sometimes painful or itchy.  Because they are often raised lesions, they may be traumatized, for example by a razor.  Occasionally dozens may erupt within a few months, usually in the setting of immunosuppression (for example autoimmune disease, cancer or certain medications).  Dermatofibroma does not give rise to cancer. However, occasionally, it may be mistaken for dermatofibrosarcoma or desmoplastic melanoma.    A dermatofibroma is harmless and seldom causes any symptoms. Usually, only reassurance is needed. If it is nuisance or causing concern, the lesion can be removed surgically, resulting in a scar that is, by definition, usually longer in diameter than the widest portion of the dermatofibroma.  Cryotherapy, shave biopsy and laser surgery are rarely completely successful.  Skin punch biopsy or incisional biopsy may be undertaken if there is an atypical feature such as recent enlargement, ulceration, or asymmetrical structures and colours on dermatoscopy.        MULTIPLE MELANOCYTIC NEVI (\"Moles\")    Physical Exam:  Anatomic Location Affected:   Mostly on sun-exposed areas of the trunk and extremities  Morphological Description:  Scattered, 1-4mm round to ovoid, symmetrical-appearing, even bordered, skin colored to dark brown macules/papules, mostly in sun-exposed areas  Pertinent Positives:  Pertinent Negatives:    Additional History of Present Condition:  present on exam     Assessment and Plan:  Based on a thorough discussion of this condition and the management approach to it (including a comprehensive discussion of the known risks, side effects and potential benefits of treatment), the patient " "(family) agrees to implement the following specific plan:  When outside we recommend using a wide brim hat, sunglasses, long sleeve and pants, sunscreen with SPF 30+ with reapplication every 2 hours, or SPF specific clothing   Benign, reassured  Regular skin check     Melanocytic Nevi  Melanocytic nevi (\"moles\") are tan or brown, raised or flat areas of the skin which have an increased number of melanocytes. Melanocytes are the cells in our body which make pigment and account for skin color.    Some moles are present at birth (I.e., \"congenital nevi\"), while others come up later in life (i.e., \"acquired nevi\").  The sun can stimulate the body to make more moles.  Sunburns are not the only thing that triggers more moles.  Chronic sun exposure can do it too.     Clinically distinguishing a healthy mole from melanoma may be difficult, even for experienced dermatologists. The \"ABCDE's\" of moles have been suggested as a means of helping to alert a person to a suspicious mole and the possible increased risk of melanoma.  The suggestions for raising alert are as follows:    Asymmetry: Healthy moles tend to be symmetric, while melanomas are often asymmetric.  Asymmetry means if you draw a line through the mole, the two halves do not match in color, size, shape, or surface texture. Asymmetry can be a result of rapid enlargement of a mole, the development of a raised area on a previously flat lesion, scaling, ulceration, bleeding or scabbing within the mole.  Any mole that starts to demonstrate \"asymmetry\" should be examined promptly by a board certified dermatologist.     Border: Healthy moles tend to have discrete, even borders.  The border of a melanoma often blends into the normal skin and does not sharply delineate the mole from normal skin.  Any mole that starts to demonstrate \"uneven borders\" should be examined promptly by a board certified dermatologist.     Color: Healthy moles tend to be one color throughout.  " "Melanomas tend to be made up of different colors ranging from dark black, blue, white, or red.  Any mole that demonstrates a color change should be examined promptly by a board certified dermatologist.     Diameter: Healthy moles tend to be smaller than 0.6 cm in size; an exception are \"congenital nevi\" that can be larger.  Melanomas tend to grow and can often be greater than 0.6 cm (1/4 of an inch, or the size of a pencil eraser). This is only a guideline, and many normal moles may be larger than 0.6 cm without being unhealthy.  Any mole that starts to change in size (small to bigger or bigger to smaller) should be examined promptly by a board certified dermatologist.     Evolving: Healthy moles tend to \"stay the same.\"  Melanomas may often show signs of change or evolution such as a change in size, shape, color, or elevation.  Any mole that starts to itch, bleed, crust, burn, hurt, or ulcerate or demonstrate a change or evolution should be examined promptly by a board certified dermatologist.        LENTIGO    Physical Exam:  Anatomic Location Affected:  trunk, arms  Morphological Description:  Light brown macules  Pertinent Positives:  Pertinent Negatives:    Additional History of Present Condition:  present on exam     Assessment and Plan:  Based on a thorough discussion of this condition and the management approach to it (including a comprehensive discussion of the known risks, side effects and potential benefits of treatment), the patient (family) agrees to implement the following specific plan:  When outside we recommend using a wide brim hat, sunglasses, long sleeve and pants, sunscreen with SPF 30+ with reapplication every 2 hours, or SPF specific clothing       What is a lentigo?  A lentigo is a pigmented flat or slightly raised lesion with a clearly defined edge. Unlike an ephelis (freckle), it does not fade in the winter months. There are several kinds of lentigo.  The name lentigo originally referred to " its appearance resembling a small lentil. The plural of lentigo is lentigines, although “lentigos” is also in common use.    Who gets lentigines?  Lentigines can affect males and females of all ages and races. Solar lentigines are especially prevalent in fair skinned adults. Lentigines associated with syndromes are present at birth or arise during childhood.    What causes lentigines?  Common forms of lentigo are due to exposure to ultraviolet radiation:  Sun damage including sunburn   Indoor tanning   Phototherapy, especially photochemotherapy (PUVA)        What is the treatment for lentigines?  Most lentigines are left alone. Attempts to lighten them may not be successful. The following approaches are used:  SPF 50+ broad-spectrum sunscreen and over the counter products containing retinol and/or vitamin C  Hydroquinone bleaching cream   Alpha hydroxy acids   Vitamin C   Retinoids   Azelaic acid   Chemical peels  Individual lesions can be permanently removed using:  Cryotherapy   Intense pulsed light   Pigment lasers    How can lentigines be prevented?  Lentigines associated with exposure ultraviolet radiation can be prevented by very careful sun protection. Clothing is more successful at preventing new lentigines than are sunscreens.    What is the outlook for lentigines?  Lentigines usually persist. They may increase in number with age and sun exposure. Some in sun-protected sites may fade and disappear.    HOUGH ANGIOMAS    Physical Exam:  Anatomic Location Affected:  trunk  Morphological Description:  Scattered cherry red, 1-4 mm papules.  Pertinent Positives:  Pertinent Negatives:    Additional History of Present Condition:  present on exam     Assessment and Plan:  Based on a thorough discussion of this condition and the management approach to it (including a comprehensive discussion of the known risks, side effects and potential benefits of treatment), the patient (family) agrees to implement the following  "specific plan:  Monitor for changes  Benign, reassured  $150 to treat up to 10 lesions, and if over 10 lesions, then additional $10/lesion thereafter.       Assessment and Plan:    Cherry angioma, also known as Lara de Doyle spots, are benign vascular skin lesions. A \"cherry angioma\" is a firm red, blue or purple papule, 0.1-1 cm in diameter. When thrombosed, they can appear black in colour until evaluated with a dermatoscope when the red or purple colour is more easily seen. Cherry angioma may develop on any part of the body but most often appear on the scalp, face, lips and trunk.  An angioma is due to proliferating endothelial cells; these are the cells that line the inside of a blood vessel.    Angiomas can arise in early life or later in life; the most common type of angioma is a cherry angioma.  Cherry angiomas are very common in males and females of any age or race. They are more noticeable in white skin than in skin of colour. They markedly increase in number from about the age of 40. There may be a family history of similar lesions. Eruptive cherry angiomas have been rarely reported to be associated with internal malignancy. The cause of angiomas is unknown. Genetic analysis of cherry angiomas has shown that they frequently carry specific somatic missense mutations in the GNAQ and GNA11 (Q209H) genes, which are involved in other vascular and melanocytic proliferations.      SEBORRHEIC KERATOSIS; NON-INFLAMED    Physical Exam:  Anatomic Location Affected:  trunk  Morphological Description:  Flat and raised, waxy, smooth to warty textured, yellow to brownish-grey to dark brown to blackish, discrete, \"stuck-on\" appearing papules.  Pertinent Positives:  Pertinent Negatives:    Additional History of Present Condition:  present on exam     Assessment and Plan:  Based on a thorough discussion of this condition and the management approach to it (including a comprehensive discussion of the known risks, side " "effects and potential benefits of treatment), the patient (family) agrees to implement the following specific plan:  Monitor for changes  Benign, reassured      Seborrheic Keratosis  A seborrheic keratosis is a harmless warty spot that appears during adult life as a common sign of skin aging.  Seborrheic keratoses can arise on any area of skin, covered or uncovered, with the usual exception of the palms and soles. They do not arise from mucous membranes. Seborrheic keratoses can have highly variable appearance.      Seborrheic keratoses are extremely common. It has been estimated that over 90% of adults over the age of 60 years have one or more of them. They occur in males and females of all races, typically beginning to erupt in the 30s or 40s. They are uncommon under the age of 20 years.  The precise cause of seborrhoeic keratoses is not known.  Seborrhoeic keratoses are considered degenerative in nature. As time goes by, seborrheic keratoses tend to become more numerous. Some people inherit a tendency to develop a very large number of them; some people may have hundreds of them.      There is no easy way to remove multiple lesions on a single occasion.  Unless a specific lesion is \"inflamed\" and is causing pain or stinging/burning or is bleeding, most insurance companies do not authorize treatment.        Scribe Attestation    I,:  Jackelin León MA am acting as a scribe while in the presence of the attending physician.:       I,:  Dinora Neri MD personally performed the services described in this documentation    as scribed in my presence.:                   [1]  No Known Allergies[2]    Current Outpatient Medications:   •  amLODIPine (NORVASC) 5 mg tablet, Take 1 tablet (5 mg total) by mouth daily, Disp: 90 tablet, Rfl: 1  •  atorvastatin (LIPITOR) 20 mg tablet, TAKE 1 TABLET BY MOUTH EVERY DAY, Disp: 90 tablet, Rfl: 1  •  Calcium Carbonate-Vitamin D 600-400 MG-UNIT per tablet, Take 1 " tablet by mouth in the morning., Disp: , Rfl:   •  cetirizine (ZyrTEC) 5 MG chewable tablet, Chew 5 mg as needed, Disp: , Rfl:   •  Diclofenac Sodium (VOLTAREN) 1 %, Apply 2 g topically 4 (four) times a day, Disp: 100 g, Rfl: 0  •  estradiol (ESTRACE VAGINAL) 0.1 mg/g vaginal cream, Insert 1 g into the vagina daily Apply 1 gram nightly for 2 weeks, then 1 gram twice a week., Disp: 42.5 g, Rfl: 0  •  lisinopril (ZESTRIL) 30 mg tablet, TAKE 1 TABLET BY MOUTH EVERY DAY, Disp: 90 tablet, Rfl: 1  •  methocarbamol (ROBAXIN) 500 mg tablet, Take 1 tablet (500 mg total) by mouth daily at bedtime as needed for muscle spasms, Disp: 60 tablet, Rfl: 0  •  Multiple Vitamins-Minerals (MULTIVITAMIN ADULT PO), Take 1 tablet by mouth in the morning., Disp: , Rfl:   •  nadolol (CORGARD) 20 mg tablet, TAKE 1.5 TABLETS (30 MG TOTAL) BY MOUTH DAILY AT BEDTIME, Disp: 135 tablet, Rfl: 1  •  Restasis 0.05 % ophthalmic emulsion, ADMINISTER 1 DROP TO BOTH EYES AS NEEDED (DRY EYES), Disp: 180 mL, Rfl: 1  •  sodium chloride (OCEAN) 0.65 % nasal spray, 1 spray into each nostril as needed for rhinitis for up to 30 days, Disp: 10 mL, Rfl: 0  •  valACYclovir (VALTREX) 1,000 mg tablet, Take 1 tablet (1,000 mg total) by mouth 2 (two) times a day for 14 days (Patient taking differently: Take 1,000 mg by mouth as needed), Disp: 90 tablet, Rfl: 3

## 2025-07-22 NOTE — PROGRESS NOTES
Name: Mary Lopez      : 1955      MRN: [id number]  Encounter Provider: ALANIS Gonzalez  Encounter Date: 2025   [facility]  :  Assessment & Plan  Lumbar spondylosis    Orders:    [place] spine and pain procedure; Future    Lumbar radiculopathy         The patient has been experiencing moderate to severe axial spine pain that is causing functional deficit.  The pain has been present for at least 3 months and is not improving with conservative care.  Currently the patient is not experiencing any radicular features nor neurogenic claudication.  Non-facet pathology has been ruled out on clinical evaluation. We will schedule the patient for bilateral L3-5 medial branch blocks with intention of moving forward towards radiofrequency ablation if there is an appropriate diagnostic response. The initial blocks will be performed with 2% lidocaine and if an appropriate response is obtained upon review of the patient's pain diary, a confirmatory block will be scheduled.  We can repeat L5-S1 LESI as needed.  I discussed with the patient that since there has been moderate to significant improvement in the pain symptoms, we will hold off on any repeat injections at this point in time. However, I reviewed with the patient that if their symptoms should return or worsen,  they should call our office to schedule to discuss repeating the injection.  Patient may continue methocarbamol as prescribed  Continue home exercise program  Follow-up pending results of blocks or sooner if needed    My impressions and treatment recommendations were discussed in detail with the patient who verbalized understanding and had no further questions.  Discharge instructions were provided. I personally saw and examined the patient and I agree with the above discussed plan of care.    History of Present Illness     Mary Lopez is a 69 y.o. female last seen in the office on 2025 who  "presents to [facility] for a follow up office visit in regards to Low back.  Since L5-S1 LESI on May 28, 2025, patient's radicular symptoms have been well-managed however she does continue with axial low back pain.  She uses methocarbamol on a as needed basis at nighttime.  The patient rates their pain a 5 out of 10 on the numeric pain rating scale. Pain is described as Intermittent and Dull-aching.    Review of Systems   Respiratory:  Negative for shortness of breath.    Cardiovascular:  Negative for chest pain.   Gastrointestinal:  Negative for constipation, diarrhea, nausea and vomiting.   Musculoskeletal:  Positive for back pain. Negative for arthralgias, joint swelling and myalgias.   Skin:  Negative for rash.   Neurological:  Negative for dizziness, seizures and weakness.   All other systems reviewed and are negative.      Medical History Reviewed by provider this encounter:     .       Objective   Ht 5' 7\" (1.702 m)   Wt 88.5 kg (195 lb)   BMI 30.54 kg/m²      Pain Score:   5  Physical Exam  Constitutional: normal, well developed, well nourished, alert, in no distress and non-toxic and no overt pain behavior.  Eyes: anicteric  HEENT: grossly intact  Neck: supple, symmetric, trachea midline and no masses   Pulmonary: even and unlabored  Cardiovascular: No edema or pitting edema present  Skin: Normal without rashes or lesions and well hydrated  Psychiatric: Mood and affect appropriate  Neurologic: Cranial Nerves II-XII grossly intact  Musculoskeletal: normal gait. Positive lumbar facet loading maneuvers  "

## 2025-07-23 ENCOUNTER — OFFICE VISIT (OUTPATIENT)
Dept: PAIN MEDICINE | Facility: CLINIC | Age: 70
End: 2025-07-23
Payer: COMMERCIAL

## 2025-07-23 VITALS — BODY MASS INDEX: 30.61 KG/M2 | HEIGHT: 67 IN | WEIGHT: 195 LBS

## 2025-07-23 DIAGNOSIS — M47.816 LUMBAR SPONDYLOSIS: Primary | ICD-10-CM

## 2025-07-23 DIAGNOSIS — M54.16 LUMBAR RADICULOPATHY: ICD-10-CM

## 2025-07-23 PROCEDURE — G2211 COMPLEX E/M VISIT ADD ON: HCPCS | Performed by: NURSE PRACTITIONER

## 2025-07-23 PROCEDURE — 99214 OFFICE O/P EST MOD 30 MIN: CPT | Performed by: NURSE PRACTITIONER

## 2025-07-30 ENCOUNTER — HOSPITAL ENCOUNTER (OUTPATIENT)
Dept: RADIOLOGY | Age: 70
Discharge: HOME | End: 2025-07-30
Payer: COMMERCIAL

## 2025-07-30 VITALS — BODY MASS INDEX: 31.08 KG/M2 | WEIGHT: 198 LBS | HEIGHT: 67 IN

## 2025-07-30 DIAGNOSIS — M85.80 OSTEOPENIA, UNSPECIFIED LOCATION: ICD-10-CM

## 2025-07-30 DIAGNOSIS — E78.2 MIXED HYPERLIPIDEMIA: ICD-10-CM

## 2025-07-30 DIAGNOSIS — M85.88 OTHER SPECIFIED DISORDERS OF BONE DENSITY AND STRUCTURE, OTHER SITE: ICD-10-CM

## 2025-07-30 PROCEDURE — 77080 DXA BONE DENSITY AXIAL: CPT

## 2025-07-30 RX ORDER — ATORVASTATIN CALCIUM 20 MG/1
20 TABLET, FILM COATED ORAL DAILY
Qty: 90 TABLET | Refills: 1 | Status: SHIPPED | OUTPATIENT
Start: 2025-07-30

## 2025-08-04 ENCOUNTER — TELEPHONE (OUTPATIENT)
Age: 70
End: 2025-08-04

## 2025-08-20 ENCOUNTER — TELEPHONE (OUTPATIENT)
Age: 70
End: 2025-08-20

## 2025-08-22 PROBLEM — M54.9 MID BACK PAIN: Status: ACTIVE | Noted: 2025-08-22
